# Patient Record
Sex: FEMALE | Race: BLACK OR AFRICAN AMERICAN | NOT HISPANIC OR LATINO | Employment: PART TIME | URBAN - METROPOLITAN AREA
[De-identification: names, ages, dates, MRNs, and addresses within clinical notes are randomized per-mention and may not be internally consistent; named-entity substitution may affect disease eponyms.]

---

## 2018-11-05 ENCOUNTER — TELEPHONE (OUTPATIENT)
Dept: OBSTETRICS AND GYNECOLOGY | Facility: CLINIC | Age: 33
End: 2018-11-05

## 2018-11-05 NOTE — TELEPHONE ENCOUNTER
----- Message from Maria Eugenia Badillo sent at 11/5/2018 10:23 AM CST -----  Contact: pt   Can the clinic reply in [facility]:     Who Called: MICHAEL CAMPBELL [[id number]]    Date of Positive Preg Test:  10-    1st day of Last Menstrual Cycle: 09-    List Any Difficulties: slight bleeding and discharge    What Number to Call Back: [phone]

## 2018-11-07 ENCOUNTER — OFFICE VISIT (OUTPATIENT)
Dept: OBSTETRICS AND GYNECOLOGY | Facility: CLINIC | Age: 33
End: 2018-11-07
Payer: MEDICAID

## 2018-11-07 VITALS — SYSTOLIC BLOOD PRESSURE: 130 MMHG | DIASTOLIC BLOOD PRESSURE: 84 MMHG | WEIGHT: 217.38 LBS

## 2018-11-07 DIAGNOSIS — Z3A.01 LESS THAN 8 WEEKS GESTATION OF PREGNANCY: Primary | ICD-10-CM

## 2018-11-07 DIAGNOSIS — N93.9 VAGINAL BLEEDING: ICD-10-CM

## 2018-11-07 DIAGNOSIS — O20.9 VAGINAL BLEEDING AFFECTING EARLY PREGNANCY: ICD-10-CM

## 2018-11-07 LAB
C TRACH DNA SPEC QL NAA+PROBE: NOT DETECTED
N GONORRHOEA DNA SPEC QL NAA+PROBE: NOT DETECTED

## 2018-11-07 PROCEDURE — 99999 PR PBB SHADOW E&M-EST. PATIENT-LVL III: CPT | Mod: PBBFAC,,, | Performed by: ADVANCED PRACTICE MIDWIFE

## 2018-11-07 PROCEDURE — 88175 CYTOPATH C/V AUTO FLUID REDO: CPT

## 2018-11-07 PROCEDURE — 99213 OFFICE O/P EST LOW 20 MIN: CPT | Mod: PBBFAC,PO | Performed by: ADVANCED PRACTICE MIDWIFE

## 2018-11-07 PROCEDURE — 99204 OFFICE O/P NEW MOD 45 MIN: CPT | Mod: S$PBB,TH,, | Performed by: ADVANCED PRACTICE MIDWIFE

## 2018-11-07 PROCEDURE — 87491 CHLMYD TRACH DNA AMP PROBE: CPT

## 2018-11-07 PROCEDURE — 87624 HPV HI-RISK TYP POOLED RSLT: CPT

## 2018-11-07 NOTE — PROGRESS NOTES
HISTORY OF PRESENT ILLNESS:    Melissa Carvalho is a 33 y.o. female, ,  Patient's last menstrual period was 2018.  for a routine exam complaining of amenorrhea.        Past Medical History:   Diagnosis Date    Heart murmur        History reviewed. No pertinent surgical history.    MEDICATIONS AND ALLERGIES:    No current outpatient medications on file.    Review of patient's allergies indicates:  No Known Allergies    Family History   Problem Relation Age of Onset    Breast cancer Neg Hx     Colon cancer Neg Hx     Ovarian cancer Neg Hx        Social History     Socioeconomic History    Marital status: Single     Spouse name: Not on file    Number of children: Not on file    Years of education: Not on file    Highest education level: Not on file   Social Needs    Financial resource strain: Not on file    Food insecurity - worry: Not on file    Food insecurity - inability: Not on file    Transportation needs - medical: Not on file    Transportation needs - non-medical: Not on file   Occupational History    Not on file   Tobacco Use    Smoking status: Never Smoker    Smokeless tobacco: Never Used   Substance and Sexual Activity    Alcohol use: No     Frequency: Never    Drug use: No    Sexual activity: Yes     Partners: Male     Birth control/protection: None   Other Topics Concern    Not on file   Social History Narrative    Not on file       COMPREHENSIVE GYN HISTORY:  PAP History: Denies abnormal Paps.  Infection History: Denies STDs. Denies PID.  Benign History: Denies uterine fibroids. Denies ovarian cysts. Denies endometriosis. Denies other conditions.  Cancer History: Denies cervical cancer. Denies uterine cancer or hyperplasia. Denies ovarian cancer. Denies vulvar cancer or pre-cancer. Denies vaginal cancer or pre-cancer. Denies breast cancer. Denies colon cancer.  Sexual Activity History: Reports currently being sexually active  Menstrual History: None.  Contraception:  None    ROS:  GENERAL: No weight changes. No swelling. No fatigue. No fever.  CARDIOVASCULAR: No chest pain. No shortness of breath. No leg cramps.   NEUROLOGICAL: No headaches. No vision changes.  BREASTS: No pain. No lumps. No discharge.  ABDOMEN: No pain. No nausea. No vomiting. No diarrhea. No constipation.  REPRODUCTIVE: No abnormal bleeding.   VULVA: No pain. No lesions. No itching.  VAGINA: No relaxation. No itching. No odor. No discharge. No lesions.  URINARY: No incontinence. No nocturia. No frequency. No dysuria.    /84   Wt 98.6 kg (217 lb 6 oz)   LMP 2018     PE:  AFFECT: Alert and oriented X 3. Interactive during exam  GENERAL: Appearance well-nourished, well-developed, in no acute distress.  HEENT: WNL  TEETH: Good dentition.  THYROID: No thyromegally   BREASTS: No masses, skin changes, nipple discharge or adenopathy bilaterally.  LUNGS: Easy and unlabored  HEART: Regular rate and rhythm   ABDOMEN: Soft and nontender without masses or organomegally.  EXTREMITIES: WNL  SKIN: No lesions or rashes.  VULVA: No lesions, masses or tenderness.  VAGINA: Moist and well rugated without lesions or discharge.Slight spotting on exam  CERVIX: Moist and pink without lesions, discharge or tenderness.      UTERUS SIZE: Limited exam sec to habitus  ADNEXA: No masses or tenderness.  RECTUM: Deferred.  ESTIMATE OF PELVIC CAPACITY: Adequate    PROCEDURES:  UPT Positive  Genprobe  Pap      DIAGNOSIS:  Gyn exam  IUP with stated LMP of 18, EGA 6wks, ROBBIN per LMP- 19    PLAN:Routine prenatal care    MEDICATIONS PRESCRIBED:  Discussed OTC PNV    LABS AND TESTS ORDERED:  New Ob Labs      1st TRIMESTER COUNSELING: Discussed all, booklet provided  Common complaints of pregnancy  HIV and other routine prenatal tests including  genetic screening  Risk factors identified by prenatal history  Anticipated course of prenatal care  Nutrition and weight gain counseling  Toxoplasmosis precautions (Cats/Raw  Meat)  Sexual activity and exercise  Environmental/Work hazards  Travel  Tobacco (Ask, Advise, Assess, Assist, and Arrange), as well as alcohol and drug use  Use of any medications (Including supplements, Vitamins, Herbs, or OTC Drugs)  Indications for Ultrasound  Domestic violence  Seat belt use  Childbirth classes/Hospital facilities     TERATOLOGY COUNSELING: Discussed options for sequential Screen                                                        Pt desires and orders for dating scan and NT                                                             FOLLOW-UP for a New Ob Visit in  4    weeks .

## 2018-11-08 ENCOUNTER — APPOINTMENT (OUTPATIENT)
Dept: LAB | Facility: HOSPITAL | Age: 33
End: 2018-11-08
Attending: OBSTETRICS & GYNECOLOGY
Payer: MEDICAID

## 2018-11-08 LAB
ABO + RH BLD: NORMAL
BASOPHILS # BLD AUTO: 0.02 K/UL
BASOPHILS NFR BLD: 0.4 %
BLD GP AB SCN CELLS X3 SERPL QL: NORMAL
DIFFERENTIAL METHOD: ABNORMAL
EOSINOPHIL # BLD AUTO: 0.1 K/UL
EOSINOPHIL NFR BLD: 1.1 %
ERYTHROCYTE [DISTWIDTH] IN BLOOD BY AUTOMATED COUNT: 16 %
HCT VFR BLD AUTO: 36.7 %
HGB BLD-MCNC: 11.8 G/DL
IMM GRANULOCYTES # BLD AUTO: 0.01 K/UL
IMM GRANULOCYTES NFR BLD AUTO: 0.2 %
LYMPHOCYTES # BLD AUTO: 1.8 K/UL
LYMPHOCYTES NFR BLD: 30.9 %
MCH RBC QN AUTO: 27.6 PG
MCHC RBC AUTO-ENTMCNC: 32.2 G/DL
MCV RBC AUTO: 86 FL
MONOCYTES # BLD AUTO: 0.5 K/UL
MONOCYTES NFR BLD: 9 %
NEUTROPHILS # BLD AUTO: 3.3 K/UL
NEUTROPHILS NFR BLD: 58.4 %
NRBC BLD-RTO: 0 /100 WBC
PLATELET # BLD AUTO: 256 K/UL
PMV BLD AUTO: 10.9 FL
RBC # BLD AUTO: 4.27 M/UL
WBC # BLD AUTO: 5.69 K/UL

## 2018-11-08 PROCEDURE — 85025 COMPLETE CBC W/AUTO DIFF WBC: CPT

## 2018-11-08 PROCEDURE — 87340 HEPATITIS B SURFACE AG IA: CPT

## 2018-11-08 PROCEDURE — 86592 SYPHILIS TEST NON-TREP QUAL: CPT

## 2018-11-08 PROCEDURE — 86850 RBC ANTIBODY SCREEN: CPT

## 2018-11-08 PROCEDURE — 86703 HIV-1/HIV-2 1 RESULT ANTBDY: CPT

## 2018-11-08 PROCEDURE — 86762 RUBELLA ANTIBODY: CPT

## 2018-11-09 ENCOUNTER — HOSPITAL ENCOUNTER (EMERGENCY)
Facility: OTHER | Age: 33
Discharge: HOME | End: 2018-11-09
Attending: EMERGENCY MEDICINE
Payer: MEDICAID

## 2018-11-09 VITALS
RESPIRATION RATE: 16 BRPM | DIASTOLIC BLOOD PRESSURE: 79 MMHG | HEIGHT: 61 IN | BODY MASS INDEX: 40.97 KG/M2 | OXYGEN SATURATION: 97 % | WEIGHT: 217 LBS | TEMPERATURE: 98 F | SYSTOLIC BLOOD PRESSURE: 168 MMHG | HEART RATE: 100 BPM

## 2018-11-09 DIAGNOSIS — O20.0 THREATENED MISCARRIAGE IN EARLY PREGNANCY: Primary | ICD-10-CM

## 2018-11-09 LAB
ALBUMIN SERPL BCP-MCNC: 3.9 G/DL
ALP SERPL-CCNC: 45 U/L
ALT SERPL W/O P-5'-P-CCNC: 14 U/L
ANION GAP SERPL CALC-SCNC: 10 MMOL/L
AST SERPL-CCNC: 18 U/L
BACTERIA #/AREA URNS HPF: ABNORMAL /HPF
BASOPHILS # BLD AUTO: 0.01 K/UL
BASOPHILS NFR BLD: 0.2 %
BILIRUB SERPL-MCNC: 0.2 MG/DL
BILIRUB UR QL STRIP: NEGATIVE
BUN SERPL-MCNC: 9 MG/DL
CALCIUM SERPL-MCNC: 10 MG/DL
CHLORIDE SERPL-SCNC: 105 MMOL/L
CLARITY UR: ABNORMAL
CO2 SERPL-SCNC: 23 MMOL/L
COLOR UR: ABNORMAL
CREAT SERPL-MCNC: 0.7 MG/DL
DIFFERENTIAL METHOD: ABNORMAL
EOSINOPHIL # BLD AUTO: 0 K/UL
EOSINOPHIL NFR BLD: 0.7 %
ERYTHROCYTE [DISTWIDTH] IN BLOOD BY AUTOMATED COUNT: 15.9 %
EST. GFR  (AFRICAN AMERICAN): >60 ML/MIN/1.73 M^2
EST. GFR  (NON AFRICAN AMERICAN): >60 ML/MIN/1.73 M^2
GLUCOSE SERPL-MCNC: 92 MG/DL
GLUCOSE UR QL STRIP: NEGATIVE
HBV SURFACE AG SERPL QL IA: NEGATIVE
HCG INTACT+B SERPL-ACNC: NORMAL MIU/ML
HCT VFR BLD AUTO: 36 %
HGB BLD-MCNC: 11.6 G/DL
HGB UR QL STRIP: ABNORMAL
HIV 1+2 AB+HIV1 P24 AG SERPL QL IA: NEGATIVE
KETONES UR QL STRIP: ABNORMAL
LEUKOCYTE ESTERASE UR QL STRIP: ABNORMAL
LYMPHOCYTES # BLD AUTO: 1.6 K/UL
LYMPHOCYTES NFR BLD: 29 %
MCH RBC QN AUTO: 27.6 PG
MCHC RBC AUTO-ENTMCNC: 32.2 G/DL
MCV RBC AUTO: 86 FL
MICROSCOPIC COMMENT: ABNORMAL
MONOCYTES # BLD AUTO: 0.4 K/UL
MONOCYTES NFR BLD: 7.3 %
NEUTROPHILS # BLD AUTO: 3.5 K/UL
NEUTROPHILS NFR BLD: 62.6 %
NITRITE UR QL STRIP: NEGATIVE
PH UR STRIP: 7 [PH] (ref 5–8)
PLATELET # BLD AUTO: 265 K/UL
PMV BLD AUTO: 10.6 FL
POTASSIUM SERPL-SCNC: 3.9 MMOL/L
PROT SERPL-MCNC: 7.7 G/DL
PROT UR QL STRIP: ABNORMAL
RBC # BLD AUTO: 4.21 M/UL
RBC #/AREA URNS HPF: >100 /HPF (ref 0–4)
RPR SER QL: NORMAL
RUBV IGG SER-ACNC: 90.3 IU/ML
RUBV IGG SER-IMP: REACTIVE
SODIUM SERPL-SCNC: 138 MMOL/L
SP GR UR STRIP: <=1.005 (ref 1–1.03)
SQUAMOUS #/AREA URNS HPF: 4 /HPF
URN SPEC COLLECT METH UR: ABNORMAL
UROBILINOGEN UR STRIP-ACNC: NEGATIVE EU/DL
WBC # BLD AUTO: 5.65 K/UL
WBC #/AREA URNS HPF: 4 /HPF (ref 0–5)

## 2018-11-09 PROCEDURE — 80053 COMPREHEN METABOLIC PANEL: CPT

## 2018-11-09 PROCEDURE — 96361 HYDRATE IV INFUSION ADD-ON: CPT

## 2018-11-09 PROCEDURE — 85025 COMPLETE CBC W/AUTO DIFF WBC: CPT

## 2018-11-09 PROCEDURE — 25000003 PHARM REV CODE 250: Performed by: PHYSICIAN ASSISTANT

## 2018-11-09 PROCEDURE — 96360 HYDRATION IV INFUSION INIT: CPT

## 2018-11-09 PROCEDURE — 81000 URINALYSIS NONAUTO W/SCOPE: CPT

## 2018-11-09 PROCEDURE — 99284 EMERGENCY DEPT VISIT MOD MDM: CPT | Mod: 25

## 2018-11-09 PROCEDURE — 84702 CHORIONIC GONADOTROPIN TEST: CPT

## 2018-11-09 RX ADMIN — SODIUM CHLORIDE 1000 ML: 0.9 INJECTION, SOLUTION INTRAVENOUS at 07:11

## 2018-11-10 NOTE — ED PROVIDER NOTES
"Encounter Date: 11/9/2018       History     Chief Complaint   Patient presents with    Vaginal Bleeding     6 weeks pregnant, moderate vaginal bleeding      Patient is 33 year old female who presents with complaints of vaginal bleeding in early pregnancy. She reports that she was at the grocery store when she felt a gush of blood as if her menstrual period was starting. She reports she went to the bathroom and noticed bright red blood saturating her underwear. She reports that there was no pain associated with this bleeding. She reports that this bleeding has now "slacked off a lot" but she reported to the ER to "get checked out just in case". She reports a speculum exam two days ago where she was told that she was about 6 weeks pregnant.  She has not yet had an ultrasound for this pregnancy.  She reports yesterday having intercourse but denies any bleeding following.  She has no associated nausea, vomiting, chest pain, shortness of breath, dizziness, altered mental status, dysuria hematuria.  Denies abdominal trauma or injury. She is currently accompanied by her male significant other who is at bedside.          Review of patient's allergies indicates:  No Known Allergies  Past Medical History:   Diagnosis Date    Heart murmur      No past surgical history on file.  Family History   Problem Relation Age of Onset    Breast cancer Neg Hx     Colon cancer Neg Hx     Ovarian cancer Neg Hx      Social History     Tobacco Use    Smoking status: Never Smoker    Smokeless tobacco: Never Used   Substance Use Topics    Alcohol use: No     Frequency: Never    Drug use: No     Review of Systems   Constitutional: Negative for fever.   HENT: Negative for sore throat.    Respiratory: Negative for shortness of breath.    Cardiovascular: Negative for chest pain.   Gastrointestinal: Negative for nausea.   Genitourinary: Positive for vaginal bleeding. Negative for dysuria.   Musculoskeletal: Negative for back pain.   Skin: " Negative for rash.   Neurological: Negative for weakness.   Hematological: Does not bruise/bleed easily.       Physical Exam     Initial Vitals [11/09/18 1904]   BP Pulse Resp Temp SpO2   (!) 198/102 104 16 99.2 °F (37.3 °C) 100 %      MAP       --         Physical Exam    Nursing note and vitals reviewed.  Constitutional: She appears well-developed and well-nourished. She is not diaphoretic. No distress.   Healthy-appearing female in no acute distress or apparent pain.  She makes good eye contact, speaks in clear full sentences and ambulates with ease.   HENT:   Head: Normocephalic and atraumatic.   Eyes: Conjunctivae and EOM are normal. Pupils are equal, round, and reactive to light. Right eye exhibits no discharge. Left eye exhibits no discharge. No scleral icterus.   Neck: Normal range of motion.   Cardiovascular: Normal rate, regular rhythm, normal heart sounds and intact distal pulses. Exam reveals no gallop and no friction rub.    No murmur heard.  Pulmonary/Chest: Breath sounds normal. She has no wheezes. She has no rhonchi. She has no rales.   Abdominal: Soft. Bowel sounds are normal. There is no tenderness. There is no rebound and no guarding.   Genitourinary:   Genitourinary Comments: Moderate amount of blood in vault with os open to 1 fingertip.  There is no CMT uterine or adnexal tender on bimanual exam.  No external genitalia lesions.   Musculoskeletal: Normal range of motion. She exhibits no edema or tenderness.   Lymphadenopathy:     She has no cervical adenopathy.   Neurological: She is alert and oriented to person, place, and time. She has normal strength.   Skin: Skin is warm. Capillary refill takes less than 2 seconds. No rash and no abscess noted. No erythema.   Psychiatric: She has a normal mood and affect. Her behavior is normal. Thought content normal.         ED Course   Procedures  Labs Reviewed   URINALYSIS, REFLEX TO URINE CULTURE   URINALYSIS MICROSCOPIC         Labs Reviewed    URINALYSIS, REFLEX TO URINE CULTURE - Abnormal; Notable for the following components:       Result Value    Color, UA Red (*)     Appearance, UA Hazy (*)     Specific Gravity, UA <=1.005 (*)     Protein, UA Trace (*)     Ketones, UA Trace (*)     Occult Blood UA 3+ (*)     Leukocytes, UA 1+ (*)     All other components within normal limits    Narrative:     Preferred Collection Type->Urine, Clean Catch   URINALYSIS MICROSCOPIC - Abnormal; Notable for the following components:    RBC, UA >100 (*)     All other components within normal limits    Narrative:     Preferred Collection Type->Urine, Clean Catch   CBC W/ AUTO DIFFERENTIAL - Abnormal; Notable for the following components:    Hemoglobin 11.6 (*)     Hematocrit 36.0 (*)     RDW 15.9 (*)     All other components within normal limits   COMPREHENSIVE METABOLIC PANEL - Abnormal; Notable for the following components:    Alkaline Phosphatase 45 (*)     All other components within normal limits   HCG, QUANTITATIVE, PREGNANCY          Imaging Results          US OB Less Than 14 Wks with Transvag(xpd (Final result)  Result time 11/09/18 21:29:02    Final result by Benny Kessler MD (11/09/18 21:29:02)                 Impression:      Pregnancy of unknown viability.  Intrauterine gestational sac is visualized which appears to contain possible septations or internal debris with no fetal pole identified.  Small amount of suspected subchorionic hemorrhage is seen.  Recommend serial beta HCGs.  Repeat pelvic ultrasound could be performed in 1-2 weeks.      Electronically signed by: Benny Kessler MD  Date:    11/09/2018  Time:    21:29             Narrative:    EXAMINATION:  US OB LESS THAN 14 WKS WITH TRANSVAGINAL (XPD)    CLINICAL HISTORY:  Vag Bleeding;    TECHNIQUE:  Transabdominal sonography of the pelvis was performed, followed by transvaginal sonography to better evaluate the uterus and ovaries.    COMPARISON:  None.    FINDINGS:  The uterus measures 12.5 x 5.6 x  6.5 cm. Uterine parenchyma is heterogenous in echotexture. In the uterine cavity there is a gestational sac with a mean diameter of 1.4 cm which would correspond with estimated gestational age of 6 weeks 1 day.  Possible septations or debris are seen within the gestational sac with no fetal pole or yolk sac visualized.  Small amount of suspected adjacent subchorionic hemorrhage is seen.    The right ovary measures 3.4 x 2.7 x 3.9 cm. The left ovary measures 1.4 x 2.8 x 2.2 cm. Arterial and venous flow are preserved bilaterally. A suspected corpus luteum cyst measuring 1.5 x 2.4 x 1.9 cm is seen in the right ovary.  No adnexal abnormalities are seen.  No significant free fluid is seen.                                     Medical Decision Making:   ED Management:  Urgent evaluation a 33-year-old female who presents with complaints of bleeding in early pregnancy.  She is afebrile, nontoxic appearing, hemodynamically stable. Physical exam outlined above and reveals benign abdomen, normal cardiopulmonary auscultation no focal neuro deficits.  Pelvic exam reveals slightly open os with moderate amount of bleeding.  No active hemorrhage..  Diagnostic labs are within normal range noting non acute anemia and no acute electrolyte abnormalities.  Urinalysis is has significant amount of red blood cells suspected to be vaginal blood contaminant.  Transvaginal ultrasound pending.     9:32 PM paged OB/GYN     Update:  Transvaginal ultrasound reveals evidence of pregnancy of unknown viability.  Patient is is candidate for beta block.  I discussed case with Dr. Rojas who will place order an outpatient lab for patient to have repeat beta HCG drawn on Monday morning.  Patient is encouraged to contact OBGYN office for follow-up appointment as early as Monday morning.  She is educated on bleeding precautions and ED return precautions.  She verbalized understanding is amenable to plan.  She is stable for discharge.     9:41 PM   Crystal calls back and says no need for patient to have repeat beta HCG on Monday.  Patient should wait for [facility] to call to make an appointment for her sometime this week.    Other:   I have discussed this case with another health care provider.       <> Summary of the Discussion: Billy                      Clinical Impression:   The encounter diagnosis was Threatened miscarriage in early pregnancy.                             Kiki Nuñez PA-C  11/09/18 [id number]

## 2018-11-10 NOTE — ED TRIAGE NOTES
"Pt presents to the ed with c/o vaginal bleeding that started today. Pt reports being 6 weeks pregnant. Pt states, " I was at the grocery store when I started to bleed, almost like my cycle was down." Pt denies abdominal pain or discomfort, n/v/d, dysuria, fever, or chills. Pt is aao, RR even and unlabored. NAD noted.  "

## 2018-11-10 NOTE — ED NOTES
Pt resting in bed comfortably with friend at bedside. Bed in the lowest locked position, side rails up x 2, call light within reach. NAD noted. Will continue to monitor

## 2018-11-12 ENCOUNTER — TELEPHONE (OUTPATIENT)
Dept: OBSTETRICS AND GYNECOLOGY | Facility: CLINIC | Age: 33
End: 2018-11-12

## 2018-11-12 LAB
HPV HR 12 DNA CVX QL NAA+PROBE: NEGATIVE
HPV16 AG SPEC QL: NEGATIVE
HPV18 DNA SPEC QL NAA+PROBE: NEGATIVE

## 2018-11-12 NOTE — TELEPHONE ENCOUNTER
Called and spoke to pt. Pt states that she is still having very light spotting since she went to ED on 11/9/18. Denies pelvic pain, NV, fever. Informed that I would ask Katharine for recommendation on lab work when she is in clinic tomorrow. Pt verbalized understanding and no further questions. ED precautions reviewed.

## 2018-11-12 NOTE — TELEPHONE ENCOUNTER
----- Message from Obinna Padilla sent at 11/12/2018 10:05 AM CST -----  Ob pt needs to talk to nurse about her visit at ER. Pt can be reached at [phone].

## 2018-11-13 ENCOUNTER — TELEPHONE (OUTPATIENT)
Dept: OBSTETRICS AND GYNECOLOGY | Facility: CLINIC | Age: 33
End: 2018-11-13

## 2018-11-13 LAB — HCG INTACT+B SERPL-ACNC: 2748 MIU/ML

## 2018-11-13 NOTE — TELEPHONE ENCOUNTER
----- Message from Janett Lao MA sent at 11/12/2018 10:34 AM CST -----  Pt went to ED 11/9, had labs and US. C/o light spotting since. Call back with recommendations.

## 2018-11-13 NOTE — TELEPHONE ENCOUNTER
Called and spoke with pt. Informed her that Katharine recommends to come in and have hcg level drawn today at [facility]. Pt states that she can be here around 2pm today. Pt c/o passing clots last night with continued vaginal bleeding this AM and lower back pain. Informed pt I would let Katharine know before she came in for bloodwork today. Pt verbalized understanding and no further questions.

## 2018-11-14 ENCOUNTER — TELEPHONE (OUTPATIENT)
Dept: OBSTETRICS AND GYNECOLOGY | Facility: CLINIC | Age: 33
End: 2018-11-14

## 2018-11-14 ENCOUNTER — HOSPITAL ENCOUNTER (EMERGENCY)
Facility: OTHER | Age: 33
Discharge: HOME | End: 2018-11-14
Attending: EMERGENCY MEDICINE
Payer: MEDICAID

## 2018-11-14 VITALS
RESPIRATION RATE: 16 BRPM | BODY MASS INDEX: 40.97 KG/M2 | HEIGHT: 61 IN | TEMPERATURE: 99 F | SYSTOLIC BLOOD PRESSURE: 175 MMHG | DIASTOLIC BLOOD PRESSURE: 112 MMHG | HEART RATE: 82 BPM | OXYGEN SATURATION: 96 % | WEIGHT: 217 LBS

## 2018-11-14 DIAGNOSIS — I10 HYPERTENSION, UNSPECIFIED TYPE: Primary | ICD-10-CM

## 2018-11-14 DIAGNOSIS — O03.9 SPONTANEOUS ABORTION: ICD-10-CM

## 2018-11-14 PROCEDURE — 99284 EMERGENCY DEPT VISIT MOD MDM: CPT

## 2018-11-14 RX ORDER — AMLODIPINE BESYLATE 5 MG/1
5 TABLET ORAL DAILY
Qty: 30 TABLET | Refills: 0 | Status: SHIPPED | OUTPATIENT
Start: 2018-11-14 | End: 2019-11-14

## 2018-11-14 NOTE — TELEPHONE ENCOUNTER
----- Message from Obinna Padilla sent at 11/14/2018 11:23 AM CST -----  Pt can be call at [phone]. Please call pt about her labs results.

## 2018-11-14 NOTE — ED PROVIDER NOTES
Encounter Date: 11/14/2018       History     Chief Complaint   Patient presents with    Hypertension     saw PCP today to establish care and was told to come here for elevated BP. currently 6 wks pregnant and spotting.      33-year-old female with history of heart murmur presents emergency department with complaints of elevated blood pressure reading.  She states that she was establishing care with primary care physician when it was noted that her blood pressure was elevated.  She states that it was 180s over 100.  She states that she was sent to the emergency department for further evaluation.  Patient was seen here in the emergency department on 11/09/2018.  She was seen for threatened miscarriage.  At the time she was having vaginal bleeding.  Ultrasound was concerning for pregnancy of unknown viability.  Patient had repeat beta HCG yesterday.  Patient unaware of results at this time.      The history is provided by the patient.     Review of patient's allergies indicates:  No Known Allergies  Past Medical History:   Diagnosis Date    Heart murmur      History reviewed. No pertinent surgical history.  Family History   Problem Relation Age of Onset    Breast cancer Neg Hx     Colon cancer Neg Hx     Ovarian cancer Neg Hx      Social History     Tobacco Use    Smoking status: Never Smoker    Smokeless tobacco: Never Used   Substance Use Topics    Alcohol use: No     Frequency: Never    Drug use: No     Review of Systems   Constitutional: Negative for chills and fever.   HENT: Negative for sore throat.    Respiratory: Negative for shortness of breath.    Cardiovascular: Negative for chest pain and leg swelling.   Gastrointestinal: Negative for abdominal pain, diarrhea, nausea and vomiting.   Genitourinary: Positive for vaginal bleeding. Negative for difficulty urinating, dysuria, flank pain, frequency, hematuria, pelvic pain and vaginal discharge.   Musculoskeletal: Negative for back pain.   Skin: Negative  for rash.   Neurological: Negative for weakness.   Hematological: Does not bruise/bleed easily.       Physical Exam     Initial Vitals [11/14/18 1145]   BP Pulse Resp Temp SpO2   (!) 186/116 93 18 98.6 °F (37 °C) 100 %      MAP       --         Physical Exam    Nursing note and vitals reviewed.  Constitutional: She appears well-developed and well-nourished. She is not diaphoretic. She is Obese .  Non-toxic appearance. No distress.   HENT:   Head: Normocephalic and atraumatic.   Right Ear: External ear normal.   Left Ear: External ear normal.   Nose: Nose normal.   Mouth/Throat: Oropharynx is clear and moist.   Eyes: Conjunctivae, EOM and lids are normal. Pupils are equal, round, and reactive to light. No scleral icterus.   Neck: Normal range of motion and phonation normal. Neck supple.   Cardiovascular: Normal rate, regular rhythm and normal heart sounds. Exam reveals no gallop and no friction rub.    No murmur heard.  No pitting edema to extremities   Pulmonary/Chest: Effort normal and breath sounds normal. No respiratory distress. She has no decreased breath sounds. She has no wheezes. She has no rhonchi. She has no rales.   Abdominal: Soft. Normal appearance and bowel sounds are normal. She exhibits no distension. There is no tenderness. There is no rigidity, no rebound, no guarding, no CVA tenderness, no tenderness at McBurney's point and negative Montanez's sign.   Musculoskeletal: Normal range of motion.   No obvious deformities, moving all extremities, normal gait   Neurological: She is alert and oriented to person, place, and time. She has normal strength. She displays no atrophy. No sensory deficit. She exhibits normal muscle tone. GCS eye subscore is 4. GCS verbal subscore is 5. GCS motor subscore is 6.   Skin: Skin is warm, dry and intact. No lesion and no rash noted. No erythema.   Psychiatric: She has a normal mood and affect. Her speech is normal and behavior is normal. Judgment normal. Cognition and  memory are normal.         ED Course   Procedures  Labs Reviewed - No data to display       Imaging Results    None          Medical Decision Making:   History:   Old Medical Records: I decided to obtain old medical records.  Initial Assessment:   33-year-old female with complaints consistent with hypertension.  Afebrile neurovascularly intact. Elevated blood pressure with no known history of hypertension.  Patient was seen here on the 9th with elevated blood pressure at that time as well.  She was seen in clinic today where her pressure was assessed 3 times and told that it was elevated and sent back to the emergency department for workup and evaluation.  She reports that she is approximately 6 weeks gestation.  She states that when she was seen here in the night she was told that it may be too early to see anything however that they were concerned for possible miscarriage.  She states that she had repeat blood work obtained yesterday in clinic and is unsure of results this time.  She denies spotting but denies cramping or pain.  She denies any urinary symptoms.  She denies chest pain or shortness of breath or swelling to the extremities. Exam is benign and documented above.  ED Management:  1:21 PM discussed with Dr Velez, gyn resident on-call.  She recommends repeat beta as once a week.  Will add to beta book and call patient with directions.  Labs were reviewed from previous ED visit on the 9th.  Patient did have trace protein in her urine.  BUN and creatinine were within normal limits.  H&H was stable with no elevation white blood cell count.  Will start patient on Norvasc 5 mg for her blood pressure.  She is urged to follow up with both OBGYN as well as her primary care physician in the next 48 hr or return to the emergency department for any worsening signs or symptoms. She states understanding and agrees this plan.  This is the extent of patient's complaints today.  This patient was discussed with the  attending physician who agrees with treatment plan.  Other:   I have discussed this case with another health care provider.       <> Summary of the Discussion: Keaton, attending ED physician and Rosie, GYN on call  This note was created using [employer] dictation.  There may be typographical errors secondary to dictation.                        Clinical Impression:     1. Hypertension, unspecified type    2. Spontaneous             Disposition:   Disposition: Discharged  Condition: Stable                        Sunitha Metcalf PA-C  18 [id number]       Sunitha Metcalf PA-C  18 [phone]

## 2018-11-14 NOTE — ED NOTES
Pt AAOx4 and appropriate at this time. Respirations even and unlabored. No acute distress noted.

## 2018-11-14 NOTE — ED TRIAGE NOTES
Pt was sent here for eval of elevated B/P after establishing care with her PCP today. Pt denies any s/s. She is 6 wks pregnant with her first pregnancy. Denies hx of HTN

## 2018-11-14 NOTE — TELEPHONE ENCOUNTER
Spoke with pt- discussed decrease in quant over last 5 days and SAB, pt reports bleeding like a light cycle with cramping- denies heavy bleeding. Has follow up quant scheduled in 1 week. Reviewed bleeding precautions.lab appt scheduled for repeat quant in 1 week.

## 2018-11-20 ENCOUNTER — LAB VISIT (OUTPATIENT)
Dept: LAB | Facility: HOSPITAL | Age: 33
End: 2018-11-20
Attending: OBSTETRICS & GYNECOLOGY
Payer: MEDICAID

## 2018-11-20 DIAGNOSIS — O03.9 SPONTANEOUS ABORTION: ICD-10-CM

## 2018-11-20 LAB — HCG INTACT+B SERPL-ACNC: 74 MIU/ML

## 2018-11-20 PROCEDURE — 84702 CHORIONIC GONADOTROPIN TEST: CPT

## 2018-11-27 ENCOUNTER — OFFICE VISIT (OUTPATIENT)
Dept: OBSTETRICS AND GYNECOLOGY | Facility: CLINIC | Age: 33
End: 2018-11-27
Payer: MEDICAID

## 2018-11-27 ENCOUNTER — APPOINTMENT (OUTPATIENT)
Dept: LAB | Facility: HOSPITAL | Age: 33
End: 2018-11-27
Attending: OBSTETRICS & GYNECOLOGY
Payer: MEDICAID

## 2018-11-27 VITALS
BODY MASS INDEX: 41.07 KG/M2 | DIASTOLIC BLOOD PRESSURE: 89 MMHG | WEIGHT: 217.38 LBS | SYSTOLIC BLOOD PRESSURE: 138 MMHG

## 2018-11-27 DIAGNOSIS — O03.9 SAB (SPONTANEOUS ABORTION): Primary | ICD-10-CM

## 2018-11-27 LAB — HCG INTACT+B SERPL-ACNC: 12 MIU/ML

## 2018-11-27 PROCEDURE — 99999 PR PBB SHADOW E&M-EST. PATIENT-LVL III: CPT | Mod: PBBFAC,,,

## 2018-11-27 PROCEDURE — 84702 CHORIONIC GONADOTROPIN TEST: CPT

## 2018-11-27 PROCEDURE — 99213 OFFICE O/P EST LOW 20 MIN: CPT | Mod: PBBFAC,TH,PO | Performed by: STUDENT IN AN ORGANIZED HEALTH CARE EDUCATION/TRAINING PROGRAM

## 2018-11-27 PROCEDURE — 99213 OFFICE O/P EST LOW 20 MIN: CPT | Mod: S$PBB,TH,, | Performed by: OBSTETRICS & GYNECOLOGY

## 2018-11-27 NOTE — PROGRESS NOTES
Chief Complaint   Patient presents with    Follow-up     D&C consult       HPI:  33 y.o. female  for follow-up after SAB. Patient presented to the ED on  with vaginal bleeding, nausea, and chills. Ultrasound at that time demonstrated intrauterine debris with no fetal pole. Patient states that she has not bled since 11/15 and denies nausea/vomiting, fever, chills, and abdominal pain. Beta-hCG was trended and was 11,797 () > 2,748 () > 74 (). Patient states that she has been doing well and would like to try to conceive again as soon as possible.       Past Medical History:   Diagnosis Date    Heart murmur      History reviewed. No pertinent surgical history.    Social History     Tobacco Use    Smoking status: Never Smoker    Smokeless tobacco: Never Used   Substance Use Topics    Alcohol use: No     Frequency: Never     Family History   Problem Relation Age of Onset    Breast cancer Neg Hx     Colon cancer Neg Hx     Ovarian cancer Neg Hx      OB History    Para Term  AB Living   1 1 1         SAB TAB Ectopic Multiple Live Births                  # Outcome Date GA Lbr Raul/2nd Weight Sex Delivery Anes PTL Lv   1 Term                   MEDICATIONS: Reviewed with patient.  ALLERGIES: Patient has no known allergies.     ROS:  Review of Systems   Constitutional: Negative for chills and fever.   Respiratory: Negative for shortness of breath.    Cardiovascular: Negative for chest pain.   Gastrointestinal: Negative for abdominal pain, diarrhea, nausea and vomiting.   Genitourinary: Negative for dysuria, menstrual problem, pelvic pain, urgency, vaginal bleeding, vaginal discharge and vaginal pain.   Musculoskeletal: Negative for back pain.   Neurological: Negative for headaches.   Psychiatric/Behavioral: Negative for depression.       PHYSICAL EXAM:    /89   Wt 98.6 kg (217 lb 6 oz)   LMP 2018 Comment: miscarriage  Breastfeeding? Unknown   BMI 41.07 kg/m²      Physical Exam:   Constitutional: She is oriented to person, place, and time. She appears well-developed and well-nourished. No distress.    HENT:   Head: Normocephalic and atraumatic.      Cardiovascular: Normal rate.     Pulmonary/Chest: Effort normal. No respiratory distress.        Abdominal: Soft. She exhibits no mass. There is no hepatosplenomegaly. There is no tenderness. There is no rebound and no guarding.     Genitourinary: Vagina normal and uterus normal. There is no rash, tenderness, lesion or injury on the right labia. There is no rash, tenderness, lesion or injury on the left labia. Uterus is not enlarged and not tender. Cervix is normal. Right adnexum displays no tenderness and no fullness. Left adnexum displays no tenderness and no fullness. No erythema, tenderness or bleeding in the vagina. No signs of injury around the vagina. No vaginal discharge found. Cervix exhibits no motion tenderness and no friability.   Genitourinary Comments: External genitalia: normal  Urethra: Non-tender, no masses  Urethral meatus: normal  Bladder: Non-tender, no masses           Musculoskeletal: She exhibits no edema or tenderness.       Neurological: She is alert and oriented to person, place, and time.    Skin: Skin is warm and dry. She is not diaphoretic.    Psychiatric: She has a normal mood and affect. Her behavior is normal. Judgment and thought content normal.         ASSESSMENT & PLAN:   SAB (spontaneous )  -     hCG, quantitative      - Pelvic exam normal  - Repeat beta-hCG today  - Counseled about trending hCG down to <5 before trying to conceive again  - Encouraged to continue taking PNVs  - Follow-up as needed    Zaynab Hopkins MD  OBGYN, PGY-1

## 2018-11-28 ENCOUNTER — TELEPHONE (OUTPATIENT)
Dept: OBSTETRICS AND GYNECOLOGY | Facility: HOSPITAL | Age: 33
End: 2018-11-28

## 2018-11-28 DIAGNOSIS — O03.9 SPONTANEOUS ABORTION: Primary | ICD-10-CM

## 2018-11-28 NOTE — TELEPHONE ENCOUNTER
Called patient and let her know that her beta-hCG yesterday was 12. Explained again that we would like to follow level down to <5 before trying to conceive again. She will get level redrawn at [facility] in 1 week. She voiced understanding and had no further questions.    Zaynab Hopkins MD  OBGYN, PGY-1

## 2018-12-06 ENCOUNTER — LAB VISIT (OUTPATIENT)
Dept: LAB | Facility: OTHER | Age: 33
End: 2018-12-06
Payer: MEDICAID

## 2018-12-06 DIAGNOSIS — O03.9 SPONTANEOUS ABORTION: ICD-10-CM

## 2018-12-06 LAB — HCG INTACT+B SERPL-ACNC: 3.5 MIU/ML

## 2018-12-06 PROCEDURE — 36415 COLL VENOUS BLD VENIPUNCTURE: CPT

## 2018-12-06 PROCEDURE — 84702 CHORIONIC GONADOTROPIN TEST: CPT

## 2018-12-07 ENCOUNTER — TELEPHONE (OUTPATIENT)
Dept: OBSTETRICS AND GYNECOLOGY | Facility: HOSPITAL | Age: 33
End: 2018-12-07

## 2018-12-07 NOTE — TELEPHONE ENCOUNTER
Spoke to patient about beta hCG results. Patient voiced understanding and had no further questions.    Zaynab Hopkins MD  OBGYN, PGY-1

## 2019-01-23 ENCOUNTER — OFFICE VISIT (OUTPATIENT)
Dept: OBSTETRICS AND GYNECOLOGY | Facility: CLINIC | Age: 34
End: 2019-01-23
Payer: MEDICAID

## 2019-01-23 VITALS
HEIGHT: 61 IN | WEIGHT: 218.25 LBS | BODY MASS INDEX: 41.21 KG/M2 | SYSTOLIC BLOOD PRESSURE: 130 MMHG | DIASTOLIC BLOOD PRESSURE: 92 MMHG

## 2019-01-23 DIAGNOSIS — Z71.9 ENCOUNTER FOR COUNSELING: Primary | ICD-10-CM

## 2019-01-23 PROCEDURE — 99999 PR PBB SHADOW E&M-EST. PATIENT-LVL III: CPT | Mod: PBBFAC,,, | Performed by: ADVANCED PRACTICE MIDWIFE

## 2019-01-23 PROCEDURE — 99213 OFFICE O/P EST LOW 20 MIN: CPT | Mod: PBBFAC,PO | Performed by: ADVANCED PRACTICE MIDWIFE

## 2019-01-23 PROCEDURE — 99212 OFFICE O/P EST SF 10 MIN: CPT | Mod: S$PBB,,, | Performed by: ADVANCED PRACTICE MIDWIFE

## 2019-01-23 PROCEDURE — 99212 PR OFFICE/OUTPT VISIT, EST, LEVL II, 10-19 MIN: ICD-10-PCS | Mod: S$PBB,,, | Performed by: ADVANCED PRACTICE MIDWIFE

## 2019-01-23 PROCEDURE — 99999 PR PBB SHADOW E&M-EST. PATIENT-LVL III: ICD-10-PCS | Mod: PBBFAC,,, | Performed by: ADVANCED PRACTICE MIDWIFE

## 2019-01-23 NOTE — PROGRESS NOTES
Pt in today as thought she was due for her Pap. Advised notmal Pap 1 year ago. Pt reports 1 normal cycle since SAB in Nov 2018. Pt is attempting pregnancy. Discussed menstrual calendar and ovulation.

## 2019-01-27 ENCOUNTER — HOSPITAL ENCOUNTER (EMERGENCY)
Facility: OTHER | Age: 34
Discharge: HOME | End: 2019-01-27
Attending: EMERGENCY MEDICINE
Payer: MEDICAID

## 2019-01-27 VITALS
DIASTOLIC BLOOD PRESSURE: 108 MMHG | RESPIRATION RATE: 18 BRPM | BODY MASS INDEX: 42.8 KG/M2 | HEIGHT: 60 IN | OXYGEN SATURATION: 100 % | HEART RATE: 110 BPM | SYSTOLIC BLOOD PRESSURE: 167 MMHG | TEMPERATURE: 101 F | WEIGHT: 218 LBS

## 2019-01-27 DIAGNOSIS — R05.9 COUGH: ICD-10-CM

## 2019-01-27 DIAGNOSIS — J20.8 ACUTE VIRAL BRONCHITIS: Primary | ICD-10-CM

## 2019-01-27 DIAGNOSIS — I10 UNCONTROLLED HYPERTENSION: ICD-10-CM

## 2019-01-27 LAB
B-HCG UR QL: NEGATIVE
CTP QC/QA: YES
INFLUENZA A, MOLECULAR: NEGATIVE
INFLUENZA B, MOLECULAR: NEGATIVE
SPECIMEN SOURCE: NORMAL

## 2019-01-27 PROCEDURE — 87502 INFLUENZA DNA AMP PROBE: CPT

## 2019-01-27 PROCEDURE — 99284 EMERGENCY DEPT VISIT MOD MDM: CPT | Mod: 25

## 2019-01-27 PROCEDURE — 25000003 PHARM REV CODE 250: Performed by: PHYSICIAN ASSISTANT

## 2019-01-27 PROCEDURE — 25000242 PHARM REV CODE 250 ALT 637 W/ HCPCS: Performed by: PHYSICIAN ASSISTANT

## 2019-01-27 PROCEDURE — 81025 URINE PREGNANCY TEST: CPT | Performed by: EMERGENCY MEDICINE

## 2019-01-27 PROCEDURE — 94640 AIRWAY INHALATION TREATMENT: CPT

## 2019-01-27 RX ORDER — ACETAMINOPHEN 500 MG
1000 TABLET ORAL
Status: COMPLETED | OUTPATIENT
Start: 2019-01-27 | End: 2019-01-27

## 2019-01-27 RX ORDER — ALBUTEROL SULFATE 0.83 MG/ML
2.5 SOLUTION RESPIRATORY (INHALATION)
Status: COMPLETED | OUTPATIENT
Start: 2019-01-27 | End: 2019-01-27

## 2019-01-27 RX ORDER — BENZONATATE 100 MG/1
100 CAPSULE ORAL 3 TIMES DAILY PRN
Qty: 20 CAPSULE | Refills: 0 | Status: SHIPPED | OUTPATIENT
Start: 2019-01-27 | End: 2019-02-06

## 2019-01-27 RX ORDER — ALBUTEROL SULFATE 90 UG/1
1-2 AEROSOL, METERED RESPIRATORY (INHALATION) EVERY 6 HOURS PRN
Qty: 1 INHALER | Refills: 0 | OUTPATIENT
Start: 2019-01-27 | End: 2021-08-27

## 2019-01-27 RX ADMIN — ALBUTEROL SULFATE 2.5 MG: 2.5 SOLUTION RESPIRATORY (INHALATION) at 03:01

## 2019-01-27 RX ADMIN — ACETAMINOPHEN 1000 MG: 500 TABLET ORAL at 04:01

## 2019-01-27 NOTE — ED NOTES
Two patient identifiers have been checked and are correct.      Appearance: Pt awake, alert & oriented to person, place & time. Pt in no acute distress at present time. Pt is clean and well groomed with clothes appropriately fastened.   Skin: Skin warm, dry & intact. Color consistent with ethnicity. Mucous membranes moist. No breakdown or brusing noted.   Musculoskeletal: Patient moving all extremities well, no obvious swelling or deformities noted.   Respiratory: Respirations spontaneous, even, and non-labored. Visible chest rise noted. Airway is open and patent. No accessory muscle use noted. Productive cough and chest congestion reported.   Neurologic: Sensation is intact. Speech is clear and appropriate. Eyes open spontaneously, behavior appropriate to situation, follows commands, facial expression symmetrical, bilateral hand grasp equal and even, purposeful motor response noted.  Cardiac: All peripheral pulses present. No Bilateral lower extremity edema. Cap refill is <3 seconds.  Abdomen: Abdomen soft, non-tender to palpation.   : Pt reports no dysuria or hematuria.

## 2019-01-27 NOTE — ED NOTES
"Pt presents to ED via self with complaints of an URI. Pt report productive cough and chest congestion which started yesterday, states "My boyfriend has got the flu". Lung sounds CTA bilaterally. Pt denies fever/chills, SOB, N/V/D. AAOx4, RR even and unlabored. Will continue to monitor.   "

## 2019-01-27 NOTE — ED PROVIDER NOTES
Encounter Date: 1/27/2019       History     Chief Complaint   Patient presents with    URI     Pt c/o subjective fever, chills, productive cough (yellow mucous) & chest congestion which started yesterday. Pt reports boyfriend who lives in the home was diagnosed with influenza A.     Patient is a 33-year-old female with history of hypertension who presents to the emergency department with cough and body aches.  Patient states over the last 2 days she has not been feeling well. She reports congestion and rhinorrhea. She reports cough with green sputum production. She states she is running fevers at home, but she has not checked with her thermometer.  She reports body aches and chills. She denies nausea or vomiting. She denies chest pain or shortness of breath. She states she does feel as if she is wheezing, but she has never had a history of asthma.  She states her  was recently diagnosed with flu.      The history is provided by the patient.     Review of patient's allergies indicates:  No Known Allergies  Past Medical History:   Diagnosis Date    Heart murmur      History reviewed. No pertinent surgical history.  Family History   Problem Relation Age of Onset    Breast cancer Neg Hx     Colon cancer Neg Hx     Ovarian cancer Neg Hx      Social History     Tobacco Use    Smoking status: Never Smoker    Smokeless tobacco: Never Used   Substance Use Topics    Alcohol use: Yes     Frequency: Never     Comment: Occasionally    Drug use: No     Review of Systems   Constitutional: Positive for activity change, appetite change, chills, fatigue and fever.   HENT: Positive for congestion and rhinorrhea. Negative for ear discharge, ear pain, postnasal drip, sore throat and trouble swallowing.    Respiratory: Positive for cough and wheezing. Negative for shortness of breath.    Cardiovascular: Negative for chest pain, palpitations and leg swelling.   Gastrointestinal: Negative for abdominal pain, blood in stool,  constipation, diarrhea, nausea and vomiting.   Genitourinary: Negative for dysuria, flank pain and hematuria.   Musculoskeletal: Negative for back pain, neck pain and neck stiffness.   Skin: Negative for rash and wound.   Neurological: Negative for dizziness, weakness, light-headedness and headaches.       Physical Exam     Initial Vitals [01/27/19 1407]   BP Pulse Resp Temp SpO2   (!) 174/130 104 18 99.4 °F (37.4 °C) 100 %      MAP       --         Physical Exam    Nursing note and vitals reviewed.  Constitutional: She appears well-developed and well-nourished. She is not diaphoretic.  Non-toxic appearance. No distress.   HENT:   Head: Normocephalic.   Right Ear: Hearing, tympanic membrane, external ear and ear canal normal.   Left Ear: Hearing, tympanic membrane, external ear and ear canal normal.   Nose: Mucosal edema present.   Mouth/Throat: Uvula is midline, oropharynx is clear and moist and mucous membranes are normal. No trismus in the jaw. No uvula swelling. No oropharyngeal exudate.   Posterior oropharyngeal cobblestoning    Eyes: Conjunctivae are normal. Pupils are equal, round, and reactive to light.   Neck: Normal range of motion.   Cardiovascular: Normal rate and regular rhythm.   No lower extremity edema   Pulmonary/Chest: No respiratory distress. She has wheezes (bilateral expiratory). She has no rhonchi. She has no rales. She exhibits no tenderness.   Abdominal: Soft. Bowel sounds are normal. There is no tenderness.   Lymphadenopathy:     She has no cervical adenopathy.   Neurological: She is alert and oriented to person, place, and time.   Skin: Skin is warm and dry. Capillary refill takes less than 2 seconds.   Psychiatric: She has a normal mood and affect.         ED Course   Procedures  Labs Reviewed   INFLUENZA A & B BY MOLECULAR   POCT URINE PREGNANCY          Imaging Results          X-Ray Chest PA And Lateral (Final result)  Result time 01/27/19 15:46:05    Final result by Sukhdeep Hung  MD (01/27/19 15:46:05)                 Impression:      1. No acute cardiopulmonary process.      Electronically signed by: Sukhdeep Hung MD  Date:    01/27/2019  Time:    15:46             Narrative:    EXAMINATION:  XR CHEST PA AND LATERAL    CLINICAL HISTORY:  Cough    TECHNIQUE:  PA and lateral views of the chest were performed.    COMPARISON:  None    FINDINGS:  The cardiomediastinal silhouette is not enlarged..  There is no pleural effusion.  The trachea is midline.  The lungs are symmetrically expanded bilaterally without evidence of acute parenchymal process. No large focal consolidation seen.  There is no pneumothorax.  The osseous structures are unremarkable.                              X-Rays:   Independently Interpreted Readings:   Other Readings:  No acute cardiopulmonary process    Medical Decision Making:   Initial Assessment:   Urgent evaluation of a 33-year-old female with history of hypertension who presents to the emergency department with cough and body aches.  Patient is afebrile, nontoxic appearing, and hemodynamically stable.  Nasal mucosal edema on exam.  Posterior oropharyngeal cobblestoning.  No nuchal rigidity.  On lung auscultation, there are diffuse expiratory wheezing bilaterally.  No lower extremity edema. Benign abdominal exam.  With patient's history and presentation, I suspect viral type syndrome.  Will check for flu.  Chest x-ray will be obtained.  Patient will be given albuterol.  ED Management:  4:24 PM  Negative flu.  Chest x-ray reveals no acute cardiopulmonary process.  Patient will be given albuterol for wheezing at home.  I suspect this is viral etiology.  She is advised to follow up with PCP sometime this week for re-evaluation.  She is advised to return to the emergency department with any worsening symptoms or concerns.    4:30 PM  Pt's blood pressure is elevated.  She states she hasn't taken her medicine in many months because she doesn't have PCP.  She is advised to  keep journal to present to PCP.    4:43 PM  Upon discharge, pt's temperature is creeping up.  Will give tylenol at this time.                      Clinical Impression:   The primary encounter diagnosis was Acute viral bronchitis. A diagnosis of Cough was also pertinent to this visit.                             Jaymie Kam PA-C  01/27/19 1625       Jaymie Kam PA-C  01/27/19 1625       Jaymie Kam PA-C  01/27/19 1631       Jaymie Kam PA-C  01/27/19 1644

## 2019-09-19 ENCOUNTER — HOSPITAL ENCOUNTER (EMERGENCY)
Facility: OTHER | Age: 34
Discharge: HOME | End: 2019-09-19
Attending: EMERGENCY MEDICINE
Payer: MEDICAID

## 2019-09-19 VITALS
RESPIRATION RATE: 18 BRPM | OXYGEN SATURATION: 98 % | SYSTOLIC BLOOD PRESSURE: 166 MMHG | HEIGHT: 61 IN | WEIGHT: 216 LBS | DIASTOLIC BLOOD PRESSURE: 109 MMHG | HEART RATE: 79 BPM | BODY MASS INDEX: 40.78 KG/M2 | TEMPERATURE: 99 F

## 2019-09-19 DIAGNOSIS — R19.7 DIARRHEA, UNSPECIFIED TYPE: Primary | ICD-10-CM

## 2019-09-19 LAB
ANION GAP SERPL CALC-SCNC: 7 MMOL/L (ref 8–16)
B-HCG UR QL: NEGATIVE
BUN SERPL-MCNC: 11 MG/DL (ref 6–20)
CALCIUM SERPL-MCNC: 9.7 MG/DL (ref 8.7–10.5)
CHLORIDE SERPL-SCNC: 106 MMOL/L (ref 95–110)
CO2 SERPL-SCNC: 28 MMOL/L (ref 23–29)
CREAT SERPL-MCNC: 0.8 MG/DL (ref 0.5–1.4)
CTP QC/QA: YES
EST. GFR  (AFRICAN AMERICAN): >60 ML/MIN/1.73 M^2
EST. GFR  (NON AFRICAN AMERICAN): >60 ML/MIN/1.73 M^2
GLUCOSE SERPL-MCNC: 103 MG/DL (ref 70–110)
POTASSIUM SERPL-SCNC: 4.1 MMOL/L (ref 3.5–5.1)
SODIUM SERPL-SCNC: 141 MMOL/L (ref 136–145)

## 2019-09-19 PROCEDURE — 80048 BASIC METABOLIC PNL TOTAL CA: CPT

## 2019-09-19 PROCEDURE — 99284 EMERGENCY DEPT VISIT MOD MDM: CPT

## 2019-09-19 PROCEDURE — 36415 COLL VENOUS BLD VENIPUNCTURE: CPT

## 2019-09-19 PROCEDURE — 81025 URINE PREGNANCY TEST: CPT | Performed by: EMERGENCY MEDICINE

## 2019-09-19 RX ORDER — CIPROFLOXACIN 500 MG/1
500 TABLET ORAL 2 TIMES DAILY
Qty: 14 TABLET | Refills: 0 | Status: SHIPPED | OUTPATIENT
Start: 2019-09-19 | End: 2019-09-26

## 2019-09-19 RX ORDER — METRONIDAZOLE 500 MG/1
500 TABLET ORAL EVERY 12 HOURS
Qty: 14 TABLET | Refills: 0 | Status: SHIPPED | OUTPATIENT
Start: 2019-09-19 | End: 2019-09-26

## 2019-09-19 NOTE — ED PROVIDER NOTES
Encounter Date: 9/19/2019    SCRIBE #1 NOTE: I, Jono Hall, am scribing for, and in the presence of, Dr. Spears.       History     Chief Complaint   Patient presents with    Diarrhea     Diarrhea since saturday. Denies vomiting     Time seen by provider: 11:07 AM    This is a 33 y.o. female with a history of a heart murmur who presents with complaint of diarrhea that began approximately five days ago. On 9/13/19 she had some seafood for dinner. The next day she started to experience abdominal pain, subjective fever, and chills. She reports that her fever and chills have since resolved. Her diarrhea has increased in frequency since onset five days ago. She denies sore throat, chest pain, shortness of breath, nausea, vomiting, blood in the stool, and dysuria.     The history is provided by the patient.     Review of patient's allergies indicates:  No Known Allergies  Past Medical History:   Diagnosis Date    Heart murmur      No past surgical history on file.  Family History   Problem Relation Age of Onset    Breast cancer Neg Hx     Colon cancer Neg Hx     Ovarian cancer Neg Hx      Social History     Tobacco Use    Smoking status: Never Smoker    Smokeless tobacco: Never Used   Substance Use Topics    Alcohol use: Yes     Frequency: Never     Comment: Occasionally    Drug use: No     Review of Systems   Constitutional: Positive for chills (that has resolved) and fever (that has resolved). Negative for diaphoresis.   HENT: Negative for congestion.    Eyes: Negative for discharge.   Respiratory: Negative for shortness of breath.    Cardiovascular: Negative for chest pain.   Gastrointestinal: Positive for abdominal pain and diarrhea. Negative for blood in stool, nausea and vomiting.   Genitourinary: Negative for dysuria.   Musculoskeletal: Negative for back pain.   Neurological: Negative for dizziness.   Hematological: Does not bruise/bleed easily.   All other systems reviewed and are  negative.      Physical Exam     Initial Vitals [09/19/19 1006]   BP Pulse Resp Temp SpO2   (!) 189/112 79 18 98.3 °F (36.8 °C) 100 %      MAP       --         Physical Exam    Nursing note and vitals reviewed.  Constitutional: She appears well-developed and well-nourished.  Non-toxic appearance. She does not have a sickly appearance. No distress.   HENT:   Head: Normocephalic and atraumatic.   Nose: Nose normal.   Mouth/Throat: Oropharynx is clear and moist.   Eyes: Conjunctivae, EOM and lids are normal. Pupils are equal, round, and reactive to light. Right eye exhibits no nystagmus. Left eye exhibits no nystagmus.   Neck: Trachea normal, normal range of motion and phonation normal. Neck supple. No stridor present.   Cardiovascular: Normal rate, regular rhythm, normal heart sounds and normal pulses. Exam reveals no gallop and no friction rub.    No murmur heard.  Pulmonary/Chest: Breath sounds normal. No respiratory distress. She has no wheezes. She has no rhonchi. She has no rales.   Abdominal: Soft. Normal appearance and bowel sounds are normal. She exhibits no distension. There is no tenderness. There is no rigidity, no rebound, no guarding, no tenderness at McBurney's point and negative Montanez's sign.   Musculoskeletal: She exhibits no edema or tenderness.   Neurological: She is alert and oriented to person, place, and time. She has normal strength and normal reflexes. She displays normal reflexes. No cranial nerve deficit or sensory deficit. She displays a negative Romberg sign. GCS eye subscore is 4. GCS verbal subscore is 5. GCS motor subscore is 6.   Skin: Skin is warm, dry and intact. Capillary refill takes less than 2 seconds. No rash noted. No pallor.   Psychiatric: Her speech is normal and behavior is normal.         ED Course   Procedures  Labs Reviewed   BASIC METABOLIC PANEL - Abnormal; Notable for the following components:       Result Value    Anion Gap 7 (*)     All other components within normal  limits   POCT URINE PREGNANCY          Imaging Results    None          Medical Decision Making:   Initial Assessment:   33-year-old female presents with diarrhea for 3-4 days.  Some nausea but no vomiting.  Has been able to maintain fluids.  The afebrile.  Will get a chemistry to evaluate for electrolyte abnormalities.  She otherwise appears well. I did note that her blood pressure has been elevated.  She stated she was diagnosed with hypertension during pregnancy but has not followed up with anybody since delivering more and has not taken indication I see no signs of end-organ damage do not feel this needs further workup at this time.  I do discuss the issue with her and recommend she follow up with primary care for further evaluation management.    Differential could include enteritis although I have a low suspicion for colitis.  She has no findings to suggest acute appendicitis or cholecystitis based on my physical exam.  Based on her age and lack of risk factors I do not suspect mesenteric ischemia.  Clinical Tests:   Lab Tests: Ordered and Reviewed  ED Management:  Blood work reviewed showing no acute abnormality.  Reassured the patient will discharge home to follow up with primary care as an outpatient.  Discharged home with Cipro and Flagyl for improvement of diarrhea.    Patient discharged home in stable condition. Diagnosis and treatment plan explained to patient and/or family member who is at bedside. I have answered all questions and the patient is satisfied with the plan of care. The patient demonstrates understanding of the care plan. This is the extent to the patients complaints today here in the emergency department.            Scribe Attestation:   Scribe #1: I performed the above scribed service and the documentation accurately describes the services I performed. I attest to the accuracy of the note.    Attending Attestation:           Physician Attestation for Scribe:  Physician Attestation  Statement for Scribe #1: I, Dr. Spears, reviewed documentation, as scribed by Jono Hall  in my presence, and it is both accurate and complete.                    Clinical Impression:     1. Diarrhea, unspecified type                                   Konstantin Spears, DO  09/19/19 [id number]

## 2019-10-22 ENCOUNTER — HOSPITAL ENCOUNTER (EMERGENCY)
Facility: OTHER | Age: 34
Discharge: HOME | End: 2019-10-22
Attending: EMERGENCY MEDICINE
Payer: COMMERCIAL

## 2019-10-22 VITALS
DIASTOLIC BLOOD PRESSURE: 83 MMHG | WEIGHT: 219 LBS | HEART RATE: 87 BPM | HEIGHT: 61 IN | RESPIRATION RATE: 18 BRPM | BODY MASS INDEX: 41.35 KG/M2 | SYSTOLIC BLOOD PRESSURE: 135 MMHG | OXYGEN SATURATION: 99 % | TEMPERATURE: 99 F

## 2019-10-22 DIAGNOSIS — S60.032A CONTUSION OF LEFT MIDDLE FINGER WITHOUT DAMAGE TO NAIL, INITIAL ENCOUNTER: Primary | ICD-10-CM

## 2019-10-22 LAB
B-HCG UR QL: NEGATIVE
CTP QC/QA: YES

## 2019-10-22 PROCEDURE — 99284 EMERGENCY DEPT VISIT MOD MDM: CPT | Mod: 25

## 2019-10-22 PROCEDURE — 81025 URINE PREGNANCY TEST: CPT | Performed by: EMERGENCY MEDICINE

## 2019-10-22 PROCEDURE — 25000003 PHARM REV CODE 250: Performed by: PHYSICIAN ASSISTANT

## 2019-10-22 RX ORDER — HYDROCODONE BITARTRATE AND ACETAMINOPHEN 5; 325 MG/1; MG/1
1 TABLET ORAL
Status: COMPLETED | OUTPATIENT
Start: 2019-10-22 | End: 2019-10-22

## 2019-10-22 RX ORDER — IBUPROFEN 600 MG/1
600 TABLET ORAL EVERY 6 HOURS PRN
Qty: 20 TABLET | Refills: 0 | Status: SHIPPED | OUTPATIENT
Start: 2019-10-22

## 2019-10-22 RX ADMIN — HYDROCODONE BITARTRATE AND ACETAMINOPHEN 1 TABLET: 5; 325 TABLET ORAL at 02:10

## 2019-10-22 NOTE — ED NOTES
Pain and swelling to left hand and first 3 digits after slamming her hand in a register drawer at work. She reports 8 out of 10 pain to left hand. Pt is AAOx  3, answers questions appropriately

## 2019-10-23 NOTE — ED PROVIDER NOTES
Encounter Date: 10/22/2019       History     Chief Complaint   Patient presents with    Hand Pain     Pt c/o left left middle & 4th finger pain radiating to dorsum of hand with decreased ROM after slamming her hand in the cash register drawer 30 mins PTA.     Patient is a 33-year-old female who has history of hypertension who presents to the emergency department with hand injury. Patient states she was at work, when the cash register slammed on her left 3rd digit.  She reports swelling and bruising to the finger.  She denies any open wounds.  She reports pain with range of motion.    The history is provided by the patient.     Review of patient's allergies indicates:  No Known Allergies  Past Medical History:   Diagnosis Date    Heart murmur      No past surgical history on file.  Family History   Problem Relation Age of Onset    Breast cancer Neg Hx     Colon cancer Neg Hx     Ovarian cancer Neg Hx      Social History     Tobacco Use    Smoking status: Never Smoker    Smokeless tobacco: Never Used   Substance Use Topics    Alcohol use: Yes     Frequency: Never     Comment: Occasionally    Drug use: No     Review of Systems   Constitutional: Negative for activity change, appetite change, chills, fatigue and fever.   HENT: Negative for congestion, ear discharge, ear pain, postnasal drip, rhinorrhea, sore throat and trouble swallowing.    Respiratory: Negative for cough.    Cardiovascular: Negative for chest pain.   Gastrointestinal: Negative for abdominal pain, blood in stool, constipation, diarrhea, nausea and vomiting.   Genitourinary: Negative for dysuria, flank pain and hematuria.   Musculoskeletal: Negative for back pain, neck pain and neck stiffness.        Finger pain   Skin: Negative for rash and wound.   Neurological: Negative for dizziness, weakness, light-headedness and headaches.       Physical Exam     Initial Vitals [10/22/19 1309]   BP Pulse Resp Temp SpO2   135/83 87 18 98.7 °F (37.1 °C) 99 %       MAP       --         Physical Exam    Nursing note and vitals reviewed.  Constitutional: She appears well-developed and well-nourished. She is not diaphoretic. No distress.   HENT:   Head: Normocephalic.   Right Ear: External ear normal.   Left Ear: External ear normal.   Nose: Nose normal.   Mouth/Throat: Oropharynx is clear and moist. No oropharyngeal exudate.   Eyes: Conjunctivae are normal. Pupils are equal, round, and reactive to light.   Neck: Normal range of motion.   Cardiovascular: Normal rate and regular rhythm.   Pulmonary/Chest: Breath sounds normal.   Abdominal: Soft. Bowel sounds are normal. There is no tenderness.   Musculoskeletal:   Left third digit: bony tenderness at the distal phalanx with ecchymosis.  NV intact.     Neurological: She is alert and oriented to person, place, and time.   Skin: Skin is warm and dry. Capillary refill takes less than 2 seconds.   Psychiatric: She has a normal mood and affect.         ED Course   Procedures  Labs Reviewed   POCT URINE PREGNANCY          Imaging Results          X-Ray Hand 3 view Left (Final result)  Result time 10/22/19 14:57:37    Final result by Genia Johnson MD (10/22/19 14:57:37)                 Impression:      No acute osseous abnormality.      Electronically signed by: Genia Johnson  Date:    10/22/2019  Time:    14:57             Narrative:    EXAMINATION:  XR HAND COMPLETE 3 VIEW LEFT    CLINICAL HISTORY:  trama;.    TECHNIQUE:  PA, lateral, and oblique views of the left hand were performed.    COMPARISON:  None    FINDINGS:  No acute, displaced fracture.  No aggressive osseous abnormality.  Joint spaces are maintained.  No focal soft tissue abnormality.                                 Medical Decision Making:   Initial Assessment:   Urgent evaluation of a 33-year-old female with history of hypertension who presents to the emergency department with a finger injury. Patient is afebrile, nontoxic appearing, and hemodynamically stable. On  exam, patient has bony tenderness to the left 3rd distal digit.  Finger is neurovascularly intact.  X-ray reveals no acute osseous injury. Patient is advised to follow up with PCP or return to the emergency department with any worsening symptoms or concerns.                      Clinical Impression:       ICD-10-CM ICD-9-CM   1. Contusion of left middle finger without damage to nail, initial encounter S60.032A 923.3                                Jaymie Kam PA-C  10/22/19 2027

## 2021-04-14 ENCOUNTER — OFFICE VISIT (OUTPATIENT)
Dept: OBSTETRICS AND GYNECOLOGY | Facility: CLINIC | Age: 36
End: 2021-04-14
Payer: MEDICAID

## 2021-04-14 VITALS
HEIGHT: 61 IN | BODY MASS INDEX: 42.95 KG/M2 | DIASTOLIC BLOOD PRESSURE: 60 MMHG | SYSTOLIC BLOOD PRESSURE: 130 MMHG | WEIGHT: 227.5 LBS

## 2021-04-14 DIAGNOSIS — Z32.02 NEGATIVE PREGNANCY TEST: Primary | ICD-10-CM

## 2021-04-14 DIAGNOSIS — N92.1 METRORRHAGIA: ICD-10-CM

## 2021-04-14 DIAGNOSIS — Z91.89 AT RISK FOR FERTILITY PROBLEMS: ICD-10-CM

## 2021-04-14 DIAGNOSIS — N94.6 DYSMENORRHEA: ICD-10-CM

## 2021-04-14 PROCEDURE — 99999 PR PBB SHADOW E&M-EST. PATIENT-LVL III: CPT | Mod: PBBFAC,,, | Performed by: NURSE PRACTITIONER

## 2021-04-14 PROCEDURE — 99999 PR PBB SHADOW E&M-EST. PATIENT-LVL III: ICD-10-PCS | Mod: PBBFAC,,, | Performed by: NURSE PRACTITIONER

## 2021-04-14 PROCEDURE — 99213 OFFICE O/P EST LOW 20 MIN: CPT | Mod: S$PBB,,, | Performed by: NURSE PRACTITIONER

## 2021-04-14 PROCEDURE — 99213 OFFICE O/P EST LOW 20 MIN: CPT | Mod: PBBFAC | Performed by: NURSE PRACTITIONER

## 2021-04-14 PROCEDURE — 99213 PR OFFICE/OUTPT VISIT, EST, LEVL III, 20-29 MIN: ICD-10-PCS | Mod: S$PBB,,, | Performed by: NURSE PRACTITIONER

## 2021-04-14 RX ORDER — CARVEDILOL 25 MG/1
25 TABLET ORAL 2 TIMES DAILY WITH MEALS
COMMUNITY

## 2021-04-14 RX ORDER — FERROUS SULFATE 324(65)MG
324 TABLET, DELAYED RELEASE (ENTERIC COATED) ORAL DAILY
COMMUNITY

## 2021-04-14 RX ORDER — LOSARTAN POTASSIUM 100 MG/1
100 TABLET ORAL DAILY
COMMUNITY

## 2021-05-23 ENCOUNTER — HOSPITAL ENCOUNTER (EMERGENCY)
Facility: OTHER | Age: 36
Discharge: HOME | End: 2021-05-23
Attending: EMERGENCY MEDICINE
Payer: MEDICAID

## 2021-05-23 VITALS
DIASTOLIC BLOOD PRESSURE: 98 MMHG | TEMPERATURE: 99 F | WEIGHT: 236 LBS | HEART RATE: 79 BPM | RESPIRATION RATE: 14 BRPM | OXYGEN SATURATION: 97 % | SYSTOLIC BLOOD PRESSURE: 170 MMHG | BODY MASS INDEX: 44.56 KG/M2 | HEIGHT: 61 IN

## 2021-05-23 DIAGNOSIS — I10 HYPERTENSION, UNSPECIFIED TYPE: Primary | ICD-10-CM

## 2021-05-23 DIAGNOSIS — I10 HYPERTENSION: ICD-10-CM

## 2021-05-23 LAB
ANION GAP SERPL CALC-SCNC: 9 MMOL/L (ref 8–16)
B-HCG UR QL: NEGATIVE
BASOPHILS # BLD AUTO: 0.03 K/UL (ref 0–0.2)
BASOPHILS NFR BLD: 0.6 % (ref 0–1.9)
BUN SERPL-MCNC: 10 MG/DL (ref 6–20)
CALCIUM SERPL-MCNC: 9.1 MG/DL (ref 8.7–10.5)
CHLORIDE SERPL-SCNC: 107 MMOL/L (ref 95–110)
CO2 SERPL-SCNC: 24 MMOL/L (ref 23–29)
CREAT SERPL-MCNC: 0.9 MG/DL (ref 0.5–1.4)
CTP QC/QA: YES
DIFFERENTIAL METHOD: ABNORMAL
EOSINOPHIL # BLD AUTO: 0.1 K/UL (ref 0–0.5)
EOSINOPHIL NFR BLD: 1.3 % (ref 0–8)
ERYTHROCYTE [DISTWIDTH] IN BLOOD BY AUTOMATED COUNT: 14.6 % (ref 11.5–14.5)
EST. GFR  (AFRICAN AMERICAN): >60 ML/MIN/1.73 M^2
EST. GFR  (NON AFRICAN AMERICAN): >60 ML/MIN/1.73 M^2
GLUCOSE SERPL-MCNC: 113 MG/DL (ref 70–110)
HCT VFR BLD AUTO: 41.9 % (ref 37–48.5)
HCV AB SERPL QL IA: NEGATIVE
HGB BLD-MCNC: 13 G/DL (ref 12–16)
HIV 1+2 AB+HIV1 P24 AG SERPL QL IA: NEGATIVE
IMM GRANULOCYTES # BLD AUTO: 0.01 K/UL (ref 0–0.04)
IMM GRANULOCYTES NFR BLD AUTO: 0.2 % (ref 0–0.5)
LYMPHOCYTES # BLD AUTO: 1.8 K/UL (ref 1–4.8)
LYMPHOCYTES NFR BLD: 33.7 % (ref 18–48)
MCH RBC QN AUTO: 27.1 PG (ref 27–31)
MCHC RBC AUTO-ENTMCNC: 31 G/DL (ref 32–36)
MCV RBC AUTO: 88 FL (ref 82–98)
MONOCYTES # BLD AUTO: 0.5 K/UL (ref 0.3–1)
MONOCYTES NFR BLD: 9.6 % (ref 4–15)
NEUTROPHILS # BLD AUTO: 2.9 K/UL (ref 1.8–7.7)
NEUTROPHILS NFR BLD: 54.6 % (ref 38–73)
NRBC BLD-RTO: 0 /100 WBC
PLATELET # BLD AUTO: 226 K/UL (ref 150–450)
PMV BLD AUTO: 10.8 FL (ref 9.2–12.9)
POTASSIUM SERPL-SCNC: 4.1 MMOL/L (ref 3.5–5.1)
RBC # BLD AUTO: 4.79 M/UL (ref 4–5.4)
SODIUM SERPL-SCNC: 140 MMOL/L (ref 136–145)
TROPONIN I SERPL DL<=0.01 NG/ML-MCNC: 0.01 NG/ML (ref 0–0.03)
WBC # BLD AUTO: 5.22 K/UL (ref 3.9–12.7)

## 2021-05-23 PROCEDURE — 81025 URINE PREGNANCY TEST: CPT | Performed by: EMERGENCY MEDICINE

## 2021-05-23 PROCEDURE — 86803 HEPATITIS C AB TEST: CPT | Performed by: EMERGENCY MEDICINE

## 2021-05-23 PROCEDURE — 99284 EMERGENCY DEPT VISIT MOD MDM: CPT | Mod: 25

## 2021-05-23 PROCEDURE — 84484 ASSAY OF TROPONIN QUANT: CPT | Performed by: EMERGENCY MEDICINE

## 2021-05-23 PROCEDURE — 93010 EKG 12-LEAD: ICD-10-PCS | Mod: ,,, | Performed by: INTERNAL MEDICINE

## 2021-05-23 PROCEDURE — 86703 HIV-1/HIV-2 1 RESULT ANTBDY: CPT | Performed by: EMERGENCY MEDICINE

## 2021-05-23 PROCEDURE — 85025 COMPLETE CBC W/AUTO DIFF WBC: CPT | Performed by: EMERGENCY MEDICINE

## 2021-05-23 PROCEDURE — 93010 ELECTROCARDIOGRAM REPORT: CPT | Mod: ,,, | Performed by: INTERNAL MEDICINE

## 2021-05-23 PROCEDURE — 80048 BASIC METABOLIC PNL TOTAL CA: CPT | Performed by: EMERGENCY MEDICINE

## 2021-05-23 PROCEDURE — 93005 ELECTROCARDIOGRAM TRACING: CPT

## 2021-11-05 ENCOUNTER — HOSPITAL ENCOUNTER (EMERGENCY)
Facility: OTHER | Age: 36
Discharge: HOME | End: 2021-11-05
Attending: EMERGENCY MEDICINE
Payer: MEDICAID

## 2021-11-05 VITALS
HEART RATE: 93 BPM | TEMPERATURE: 99 F | SYSTOLIC BLOOD PRESSURE: 169 MMHG | DIASTOLIC BLOOD PRESSURE: 104 MMHG | BODY MASS INDEX: 42.29 KG/M2 | HEIGHT: 61 IN | WEIGHT: 224 LBS | RESPIRATION RATE: 20 BRPM | OXYGEN SATURATION: 99 %

## 2021-11-05 DIAGNOSIS — I10 HYPERTENSION, UNSPECIFIED TYPE: ICD-10-CM

## 2021-11-05 DIAGNOSIS — R11.2 NAUSEA VOMITING AND DIARRHEA: Primary | ICD-10-CM

## 2021-11-05 DIAGNOSIS — R19.7 NAUSEA VOMITING AND DIARRHEA: Primary | ICD-10-CM

## 2021-11-05 LAB
ALBUMIN SERPL BCP-MCNC: 3.9 G/DL (ref 3.5–5.2)
ALP SERPL-CCNC: 50 U/L (ref 55–135)
ALT SERPL W/O P-5'-P-CCNC: 24 U/L (ref 10–44)
ANION GAP SERPL CALC-SCNC: 10 MMOL/L (ref 8–16)
AST SERPL-CCNC: 24 U/L (ref 10–40)
B-HCG UR QL: NEGATIVE
BACTERIA #/AREA URNS HPF: ABNORMAL /HPF
BASOPHILS # BLD AUTO: 0.01 K/UL (ref 0–0.2)
BASOPHILS NFR BLD: 0.2 % (ref 0–1.9)
BILIRUB SERPL-MCNC: 0.2 MG/DL (ref 0.1–1)
BILIRUB UR QL STRIP: NEGATIVE
BUN SERPL-MCNC: 12 MG/DL (ref 6–20)
CALCIUM SERPL-MCNC: 9.4 MG/DL (ref 8.7–10.5)
CHLORIDE SERPL-SCNC: 107 MMOL/L (ref 95–110)
CLARITY UR: CLEAR
CO2 SERPL-SCNC: 23 MMOL/L (ref 23–29)
COLOR UR: YELLOW
CREAT SERPL-MCNC: 0.8 MG/DL (ref 0.5–1.4)
CTP QC/QA: YES
DIFFERENTIAL METHOD: ABNORMAL
EOSINOPHIL # BLD AUTO: 0 K/UL (ref 0–0.5)
EOSINOPHIL NFR BLD: 0.4 % (ref 0–8)
ERYTHROCYTE [DISTWIDTH] IN BLOOD BY AUTOMATED COUNT: 15.3 % (ref 11.5–14.5)
EST. GFR  (AFRICAN AMERICAN): >60 ML/MIN/1.73 M^2
EST. GFR  (NON AFRICAN AMERICAN): >60 ML/MIN/1.73 M^2
GLUCOSE SERPL-MCNC: 141 MG/DL (ref 70–110)
GLUCOSE UR QL STRIP: NEGATIVE
HCT VFR BLD AUTO: 41.7 % (ref 37–48.5)
HGB BLD-MCNC: 12.9 G/DL (ref 12–16)
HGB UR QL STRIP: ABNORMAL
HYALINE CASTS #/AREA URNS LPF: 0 /LPF
IMM GRANULOCYTES # BLD AUTO: 0.02 K/UL (ref 0–0.04)
IMM GRANULOCYTES NFR BLD AUTO: 0.4 % (ref 0–0.5)
KETONES UR QL STRIP: NEGATIVE
LEUKOCYTE ESTERASE UR QL STRIP: ABNORMAL
LIPASE SERPL-CCNC: 34 U/L (ref 4–60)
LYMPHOCYTES # BLD AUTO: 2 K/UL (ref 1–4.8)
LYMPHOCYTES NFR BLD: 34.8 % (ref 18–48)
MCH RBC QN AUTO: 26.9 PG (ref 27–31)
MCHC RBC AUTO-ENTMCNC: 30.9 G/DL (ref 32–36)
MCV RBC AUTO: 87 FL (ref 82–98)
MICROSCOPIC COMMENT: ABNORMAL
MONOCYTES # BLD AUTO: 0.5 K/UL (ref 0.3–1)
MONOCYTES NFR BLD: 9.4 % (ref 4–15)
NEUTROPHILS # BLD AUTO: 3.1 K/UL (ref 1.8–7.7)
NEUTROPHILS NFR BLD: 54.8 % (ref 38–73)
NITRITE UR QL STRIP: NEGATIVE
NRBC BLD-RTO: 0 /100 WBC
PH UR STRIP: 6 [PH] (ref 5–8)
PLATELET # BLD AUTO: 263 K/UL (ref 150–450)
PMV BLD AUTO: 10.9 FL (ref 9.2–12.9)
POCT GLUCOSE: 116 MG/DL (ref 70–110)
POTASSIUM SERPL-SCNC: 4.6 MMOL/L (ref 3.5–5.1)
PROT SERPL-MCNC: 8.1 G/DL (ref 6–8.4)
PROT UR QL STRIP: NEGATIVE
RBC # BLD AUTO: 4.79 M/UL (ref 4–5.4)
RBC #/AREA URNS HPF: 1 /HPF (ref 0–4)
SODIUM SERPL-SCNC: 140 MMOL/L (ref 136–145)
SP GR UR STRIP: 1.02 (ref 1–1.03)
SQUAMOUS #/AREA URNS HPF: 8 /HPF
URN SPEC COLLECT METH UR: ABNORMAL
UROBILINOGEN UR STRIP-ACNC: NEGATIVE EU/DL
WBC # BLD AUTO: 5.66 K/UL (ref 3.9–12.7)
WBC #/AREA URNS HPF: 6 /HPF (ref 0–5)

## 2021-11-05 PROCEDURE — 82962 GLUCOSE BLOOD TEST: CPT

## 2021-11-05 PROCEDURE — 83690 ASSAY OF LIPASE: CPT | Performed by: EMERGENCY MEDICINE

## 2021-11-05 PROCEDURE — 99284 EMERGENCY DEPT VISIT MOD MDM: CPT | Mod: 25

## 2021-11-05 PROCEDURE — 81025 URINE PREGNANCY TEST: CPT | Performed by: EMERGENCY MEDICINE

## 2021-11-05 PROCEDURE — 25000003 PHARM REV CODE 250: Performed by: EMERGENCY MEDICINE

## 2021-11-05 PROCEDURE — 63600175 PHARM REV CODE 636 W HCPCS: Performed by: EMERGENCY MEDICINE

## 2021-11-05 PROCEDURE — 85025 COMPLETE CBC W/AUTO DIFF WBC: CPT | Performed by: EMERGENCY MEDICINE

## 2021-11-05 PROCEDURE — 96361 HYDRATE IV INFUSION ADD-ON: CPT

## 2021-11-05 PROCEDURE — 80053 COMPREHEN METABOLIC PANEL: CPT | Performed by: EMERGENCY MEDICINE

## 2021-11-05 PROCEDURE — 96374 THER/PROPH/DIAG INJ IV PUSH: CPT

## 2021-11-05 PROCEDURE — 81000 URINALYSIS NONAUTO W/SCOPE: CPT | Performed by: EMERGENCY MEDICINE

## 2021-11-05 RX ORDER — ONDANSETRON 2 MG/ML
4 INJECTION INTRAMUSCULAR; INTRAVENOUS
Status: COMPLETED | OUTPATIENT
Start: 2021-11-05 | End: 2021-11-05

## 2021-11-05 RX ORDER — ONDANSETRON 4 MG/1
4 TABLET, ORALLY DISINTEGRATING ORAL EVERY 8 HOURS PRN
Qty: 12 TABLET | Refills: 0 | Status: SHIPPED | OUTPATIENT
Start: 2021-11-05

## 2021-11-05 RX ORDER — DICYCLOMINE HYDROCHLORIDE 10 MG/1
20 CAPSULE ORAL
Status: COMPLETED | OUTPATIENT
Start: 2021-11-05 | End: 2021-11-05

## 2021-11-05 RX ORDER — MORPHINE SULFATE 2 MG/ML
2 INJECTION, SOLUTION INTRAMUSCULAR; INTRAVENOUS
Status: DISCONTINUED | OUTPATIENT
Start: 2021-11-05 | End: 2021-11-05 | Stop reason: HOSPADM

## 2021-11-05 RX ADMIN — DICYCLOMINE HYDROCHLORIDE 20 MG: 10 CAPSULE ORAL at 10:11

## 2021-11-05 RX ADMIN — SODIUM CHLORIDE 1000 ML: 0.9 INJECTION, SOLUTION INTRAVENOUS at 08:11

## 2021-11-05 RX ADMIN — ONDANSETRON 4 MG: 2 INJECTION INTRAMUSCULAR; INTRAVENOUS at 08:11

## 2022-07-24 ENCOUNTER — CLINICAL SUPPORT (OUTPATIENT)
Dept: URGENT CARE | Facility: CLINIC | Age: 37
End: 2022-07-24
Payer: MEDICAID

## 2022-07-24 ENCOUNTER — OFFICE VISIT (OUTPATIENT)
Dept: URGENT CARE | Facility: CLINIC | Age: 37
End: 2022-07-24
Payer: MEDICAID

## 2022-07-24 VITALS
SYSTOLIC BLOOD PRESSURE: 127 MMHG | HEART RATE: 104 BPM | DIASTOLIC BLOOD PRESSURE: 86 MMHG | WEIGHT: 240 LBS | BODY MASS INDEX: 45.31 KG/M2 | HEIGHT: 61 IN | RESPIRATION RATE: 18 BRPM | OXYGEN SATURATION: 100 % | TEMPERATURE: 102 F

## 2022-07-24 DIAGNOSIS — U07.1 COVID: Primary | ICD-10-CM

## 2022-07-24 DIAGNOSIS — J02.9 SORE THROAT: Primary | ICD-10-CM

## 2022-07-24 DIAGNOSIS — R05.9 COUGH: ICD-10-CM

## 2022-07-24 LAB
CTP QC/QA: YES
SARS-COV-2 RDRP RESP QL NAA+PROBE: POSITIVE

## 2022-07-24 PROCEDURE — 99203 PR OFFICE/OUTPT VISIT, NEW, LEVL III, 30-44 MIN: ICD-10-PCS | Mod: S$GLB,,,

## 2022-07-24 PROCEDURE — 4010F PR ACE/ARB THEARPY RXD/TAKEN: ICD-10-PCS | Mod: CPTII,S$GLB,,

## 2022-07-24 PROCEDURE — 3079F PR MOST RECENT DIASTOLIC BLOOD PRESSURE 80-89 MM HG: ICD-10-PCS | Mod: CPTII,S$GLB,,

## 2022-07-24 PROCEDURE — 1159F MED LIST DOCD IN RCRD: CPT | Mod: CPTII,S$GLB,,

## 2022-07-24 PROCEDURE — 4010F ACE/ARB THERAPY RXD/TAKEN: CPT | Mod: CPTII,S$GLB,,

## 2022-07-24 PROCEDURE — 3079F DIAST BP 80-89 MM HG: CPT | Mod: CPTII,S$GLB,,

## 2022-07-24 PROCEDURE — U0002 COVID-19 LAB TEST NON-CDC: HCPCS | Mod: QW,S$GLB,,

## 2022-07-24 PROCEDURE — 3074F PR MOST RECENT SYSTOLIC BLOOD PRESSURE < 130 MM HG: ICD-10-PCS | Mod: CPTII,S$GLB,,

## 2022-07-24 PROCEDURE — 1160F PR REVIEW ALL MEDS BY PRESCRIBER/CLIN PHARMACIST DOCUMENTED: ICD-10-PCS | Mod: CPTII,S$GLB,,

## 2022-07-24 PROCEDURE — 3008F BODY MASS INDEX DOCD: CPT | Mod: CPTII,S$GLB,,

## 2022-07-24 PROCEDURE — 3008F PR BODY MASS INDEX (BMI) DOCUMENTED: ICD-10-PCS | Mod: CPTII,S$GLB,,

## 2022-07-24 PROCEDURE — 99203 OFFICE O/P NEW LOW 30 MIN: CPT | Mod: S$GLB,,,

## 2022-07-24 PROCEDURE — 1160F RVW MEDS BY RX/DR IN RCRD: CPT | Mod: CPTII,S$GLB,,

## 2022-07-24 PROCEDURE — U0002: ICD-10-PCS | Mod: QW,S$GLB,,

## 2022-07-24 PROCEDURE — 3074F SYST BP LT 130 MM HG: CPT | Mod: CPTII,S$GLB,,

## 2022-07-24 PROCEDURE — 1159F PR MEDICATION LIST DOCUMENTED IN MEDICAL RECORD: ICD-10-PCS | Mod: CPTII,S$GLB,,

## 2022-07-24 RX ORDER — PROMETHAZINE HYDROCHLORIDE AND DEXTROMETHORPHAN HYDROBROMIDE 6.25; 15 MG/5ML; MG/5ML
5 SYRUP ORAL NIGHTLY PRN
Qty: 180 ML | Refills: 0 | Status: SHIPPED | OUTPATIENT
Start: 2022-07-24 | End: 2022-08-03

## 2022-07-24 RX ORDER — ATORVASTATIN CALCIUM 40 MG/1
40 TABLET, FILM COATED ORAL DAILY
COMMUNITY
Start: 2022-07-09

## 2022-07-24 RX ORDER — LORATADINE 10 MG/1
TABLET ORAL
COMMUNITY

## 2022-07-24 NOTE — PROGRESS NOTES
"Subjective:       Patient ID: Melissa Carvalho is a 36 y.o. female.    Vitals:  height is 5' 1" (1.549 m) and weight is 108.9 kg (240 lb). Her temperature is 101.8 °F (38.8 °C) (abnormal). Her blood pressure is 127/86 and her pulse is 104. Her respiration is 18 and oxygen saturation is 100%.     Chief Complaint: COVID-19 Concerns    Patient just tested positive for covid-19 as a nurse visit.   Patient is requesting anti-viral.   Pt states when she coughs it hurts.     Other  This is a new problem. The current episode started today. The problem occurs rarely. The problem has been unchanged. Associated symptoms include chills, congestion, coughing, fatigue, a fever, headaches and a sore throat. Pertinent negatives include no abdominal pain, anorexia, arthralgias, change in bowel habit, chest pain, diaphoresis, joint swelling, myalgias, nausea, neck pain, numbness, rash, swollen glands, urinary symptoms, vertigo, visual change, vomiting or weakness. She has tried acetaminophen (Dayquil,Vitamin) for the symptoms. The treatment provided mild relief.       Constitution: Positive for chills, fatigue and fever. Negative for sweating.   HENT: Positive for congestion and sore throat.    Neck: Negative for neck pain.   Cardiovascular: Negative for chest pain.   Respiratory: Positive for cough.    Gastrointestinal: Negative for abdominal pain, nausea and vomiting.   Musculoskeletal: Negative for joint pain, joint swelling and muscle ache.   Skin: Negative for rash.   Neurological: Positive for headaches. Negative for history of vertigo and numbness.       Objective:      Physical Exam   Constitutional: She is oriented to person, place, and time. She appears well-developed. She is cooperative.  Non-toxic appearance. She does not appear ill. No distress.      Comments:Sitting comfortably in exam room, well appearing. NAD  Able to talk in complete sentences without difficulty or pause       HENT:   Head: Normocephalic and " atraumatic.   Ears:   Right Ear: Hearing and external ear normal.   Left Ear: Hearing and external ear normal.   Nose: No epistaxis.   Eyes: Conjunctivae and lids are normal. No scleral icterus.   Neck: Trachea normal and phonation normal. Neck supple. No edema present. No erythema present. No neck rigidity present.   Cardiovascular: Normal rate, regular rhythm, normal heart sounds and normal pulses.   Pulmonary/Chest: Effort normal and breath sounds normal. No respiratory distress. She has no decreased breath sounds. She has no rhonchi.   Abdominal: Normal appearance.   Musculoskeletal: Normal range of motion.         General: No deformity. Normal range of motion.   Neurological: She is alert and oriented to person, place, and time. She exhibits normal muscle tone. Coordination normal.   Skin: Skin is warm, dry, intact, not diaphoretic and not pale.   Psychiatric: Her speech is normal and behavior is normal. Judgment and thought content normal.   Nursing note and vitals reviewed.    Results for orders placed or performed in visit on 07/24/22   POCT COVID-19 Rapid Screening   Result Value Ref Range    POC Rapid COVID Positive (A) Negative     Acceptable Yes      1        Assessment:       1. COVID    2. Cough          Plan:         Risk score of 1 . Discussed risks vs benefits and side effects of Paxlovid. Patient would like to proceed with treatment with Paxlovid. Drug interactions reviewed and documented in AVS-- . She verbalized understanding to the above.     Reviewed positive covid test.  Discussed results with patient and proper quarantine based on CDC guidelines.   Discussed use of OTC medications for symptom control as this is a viral disease.   All ER precautions covered including but not limited to shortness of breath, intractable fever, or chest pain.  Discussed RTC if symptoms worsen, change, or persist.     Patient verbalized understanding and agreed with the plan.     Ning Quintero  PA-C          COVID  -     nirmatrelvir-ritonavir 300 mg (150 mg x 2)-100 mg copackaged tablets (EUA); Take 3 tablets by mouth 2 (two) times daily for 5 days. Each dose contains 2 nirmatrelvir (pink tablets) and 1 ritonavir (white tablet). Take all 3 tablets together  Dispense: 30 tablet; Refill: 0    Cough  -     promethazine-dextromethorphan (PROMETHAZINE-DM) 6.25-15 mg/5 mL Syrp; Take 5 mLs by mouth nightly as needed.  Dispense: 180 mL; Refill: 0         Patient Instructions   You will have to stop Atorvastatin while you take Paxlovid and for 3 days after you complete it as well.       You have been prescribed Paxlovid, which is an antiviral medication.     Oral antiviral treatment may help your body fight COVID-19 by stopping the SARS-CoV-2 virus (the virus that causes COVID-19) from multiplying in your body, lowering the amount of the virus within your body, or helping your immune system. By getting treatment, you could have less serious symptoms and may lower the chances of your illness getting worse and needing care in the hospital.    To be eligible for Paxlovid, you must be at least 12 years of age and weigh at least 88 pounds.    What is PAXLOVID?    PAXLOVID is an investigational medicine used to treat mild-to-moderate COVID-19 in  adults and children [12 years of age and older weighing at least 88 pounds (40 kg)] with  positive results of direct SARS-CoV-2 viral testing, and who are at high risk for  progression to severe COVID-19, including hospitalization or death. PAXLOVID is  investigational because it is still being studied. There is limited information about the  safety and effectiveness of using PAXLOVID to treat people with mild-to-moderate  COVID-19.  The FDA has authorized the emergency use of PAXLOVID for the treatment of mild-tomoderate COVID-19 in adults and children [12 years of age and older weighing at least  88 pounds (40 kg)] with a positive test for the virus that causes COVID-19, and  who are  at high risk for progression to severe COVID-19, including hospitalization or death,  under an EUA.    Tell your healthcare provider if you:    Have any allergies    Have liver or kidney disease    Are pregnant or plan to become pregnant    Are breastfeeding a child    Have any serious illnesses  Tell your healthcare provider about all the medicines you take, including  prescription and over-the-counter medicines, vitamins, and herbal supplements.  Some medicines may interact with PAXLOVID and may cause serious side effects.  Keep a list of your medicines to show your healthcare provider and pharmacist when  you get a new medicine.  You can ask your healthcare provider or pharmacist for a list of medicines that interact  with PAXLOVID. Do not start taking a new medicine without telling your  healthcare provider. Your healthcare provider can tell you if it is safe to take  PAXLOVID with other medicines    How do I take PAXLOVID?   PAXLOVID consists of 2 medicines: nirmatrelvir and ritonavir.   Take 2 pink tablets of nirmatrelvir with 1 white tablet of ritonavir by mouth  2 times each day (in the morning and in the evening) for 5 days. For each  dose, take all 3 tablets at the same time.   If you have kidney disease, talk to your healthcare provider. You  may need a different dose.   Swallow the tablets whole. Do not chew, break, or crush the tablets.   Take PAXLOVID with or without food.   Do not stop taking PAXLOVID without talking to your healthcare provider, even if  you feel better.   If you miss a dose of PAXLOVID within 8 hours of the time it is usually taken,  take it as soon as you remember. If you miss a dose by more than 8 hours, skip  the missed dose and take the next dose at your regular time. Do not take  2 doses of PAXLOVID at the same time.   If you take too much PAXLOVID, call your healthcare provider or go to the  nearest hospital emergency room right away.   If you are  taking a ritonavir- or cobicistat-containing medicine to treat hepatitis C  or Human Immunodeficiency Virus (HIV), you should continue to take your  medicine as prescribed by your healthcare provider    Possible side effects of PAXLOVID are:   Allergic Reactions. Allergic reactions can happen in people taking  PAXLOVID, even after only 1 dose. Stop taking PAXLOVID and call your  healthcare provider right away if you get any of the following symptoms of an  allergic reaction:   hives   trouble swallowing or breathing   swelling of the mouth, lips, or face   throat tightness   hoarseness   skin rash   Liver Problems. Tell your healthcare provider right away if you have any of  these signs and symptoms of liver problems: loss of appetite, yellowing of your  skin and the whites of eyes (jaundice), dark-colored urine, pale colored stools  and itchy skin, stomach area (abdominal) pain.   Resistance to HIV Medicines: If you have untreated HIV infection, PAXLOVID  may lead to some HIV medicines not working as well in the future.   Other possible side effects include:   altered sense of taste   diarrhea   high blood pressure   muscle aches  These are not all the possible side effects of PAXLOVID. Not many people have taken  PAXLOVID. Serious and unexpected side effects may happen. PAXLOVID is still being  studied, so it is possible that all of the risks are not known at this time.                You have tested positive for COVID-19 today.      ISOLATION  If you tested positive and do not have symptoms, you must isolate for 5 days starting on the day of the positive test. I    If you tested positive and have symptoms, you must isolate for 5 days starting on the day of the first symptoms,  not the day of the positive test.     This is the most important part, both the CDC and the [facility] emphasize that you do not test out of isolation.     After 5 days, if your symptoms have improved and you have not had fever on  day 5, you can return to the community on day 6- NO TESTING REQUIRED!      In fact, we do not retest if you were positive in the last 90 days.    After your 5 days of isolation are completed, the CDC recommends strict mask use for the first 5 days that you come out of isolation. PLEASE FOLLOW CDC GUIDELINES REGARDING TRAVEL AFTER COVID INFECTION.     Return to Urgent Care or go to ER if symptoms worsen or fail to improve.  Follow up with PCP as recommended for further management.     Your symptoms should begin to improve by Day 5 of the infection-- if symptoms worsen, you develop a new fever, shortness of breath, worsening shortness of breath with activity, chest pain, worsening cough, you must return to Urgent Care or go to the ER.    PLEASE READ YOUR DISCHARGE INSTRUCTIONS ENTIRELY AS IT CONTAINS IMPORTANT INFORMATION.      Please drink plenty of fluids.    Please get plenty of rest.    Please return here or go to the Emergency Department for any concerns or worsening of condition.      Tylenol or ibuprofen can also be used as directed for pain and fever unless you have an allergy to them or medical condition such as stomach ulcers, kidney or liver disease or blood thinners etc for which you should not be taking these type of medications.   YOU CAN ALTERNATE TYLENOL AND IBUPROFEN EVERY 3-4 HOURS. Take 1000mg (2 pills) of Extra Strength Acetaminophen (Tylenol) every 6 hours and 600mg (3 pills) of Ibuprofen (Motrin/Advil) every 6 hours, alternating the two so that every 3 hours you take one or the other. Start with the Tylenol, then 3 hours later take the Ibuprofen, then 3 hours later take the Tylenol again, and so on.         For your allergy symptoms and/or runny nose, sinus/ear pressure, congestion:        - You can take over-the-counter claritin, zyrtec, allegra, or xyzal as directed. These are antihistamines that can help with runny nose, nasal congestion, sneezing, and helps to dry up post-nasal drip, which  usually causes sore throat and cough.               - If you do NOT have high blood pressure, you may use a decongestant form (D)  of this medication (ie. Claritin- D, zyrtec-D, allegra-D) or if you do not take the D form, you can take sudafed (pseudoephedrine) over the counter, which is a decongestant. Do NOT take two decongestant (D) medications at the same time (such as mucinex-D and claritin-D or plain sudafed and claritin D). Dextromethorphan (DM) is a cough suppressant over the counter (ie. mucinex DM, robitussin, delsym; dayquil/nyquil has DM as well.)    -If you DO have high blood pressure, AVOID DECONGESTANTS (I.e., Phenylephrine and Pseudoephedrine). You may take Coricidin HBP for sinus congestion and Delsym for cough.        - You can take plain Mucinex (guaifenesin) 1200 mg twice a day to help loosen mucous.       Use over the counter flonase or nasocort: one spray each nostril twice daily OR two sprays each nostril once daily until nares dry out, unless you have Glaucoma.   If you find this dries your nose out or your nose bleeds, try using over the counter nasal saline a few minutes prior to using the flonase to moisten the lining of your nose and throughout the day as needed.   Flonase/nasal spray use directions:  1) Use once per day.  2) Blow nose first.  3) Close one nostril and spray flonase up the other nostril while inhaling gently.   4) If you inhale to aggressively, you will have a bitter taste in the back of your mouth.       You can try breathe right strips at night to help you breathe.  A cool mist humidifier in bedroom may help with cough and relieve stuffy nose.     Sinus rinses DO NOT USE TAP WATER, if you must, water must be a rolling boil for 1 minute, let it cool, then use.  May use distilled water, or over the counter nasal saline rinses.  Vics vapor rub in shower to help open nasal passages.  May use nasal gel to keep passages moisturized.  May use Nasal saline sprays during the day  for added relief of congestion.   For those who go to the gym, please do not use the sauna or steam room now to clear sinuses.      Sore throat recommendations: Warm fluids, warm salt water gargles, throat lozenges, tea, honey, soup, rest, hydration.      Cough     Honey with tim to soothe your throat    Robitussin or Delsyum for cough suppressant for dry cough.    Mucinex DM or products containing Guaifenesin or Dextromethorphan for expectorant (wet cough).    Take prescription cough meds (pills) as prescribed; take prescription cough syrup at night as needed for cough.  Do not take both the prescribed cough pills and syrup at the same time or within 6 hours of each other.  Do not take the cough syrup with any other sedative medication as it can can cause drowsiness. Do not operate any heavy machinery, drink or drive while taking the cough syrup.    Try taking half a dose first of the cough syrup to see how it affects you.       Please follow up with your primary care doctor or specialist in the next 48-72hrs as needed and if no improvement    If you  smoke, please stop smoking.      Please return or see your primary care doctor if you develop new or worsening symptoms.     Please arrange follow up with your primary medical clinic as soon as possible. You must understand that you've received an Urgent Care treatment only and that you may be released before all of your medical problems are known or treated. You, the patient, will arrange for follow up as instructed. If your symptoms worsen or fail to improve you should go to the Emergency Room.

## 2022-07-24 NOTE — PATIENT INSTRUCTIONS
You will have to stop Atorvastatin while you take Paxlovid and for 3 days after you complete it as well.       You have been prescribed Paxlovid, which is an antiviral medication.     Oral antiviral treatment may help your body fight COVID-19 by stopping the SARS-CoV-2 virus (the virus that causes COVID-19) from multiplying in your body, lowering the amount of the virus within your body, or helping your immune system. By getting treatment, you could have less serious symptoms and may lower the chances of your illness getting worse and needing care in the hospital.    To be eligible for Paxlovid, you must be at least 12 years of age and weigh at least 88 pounds.    What is PAXLOVID?    PAXLOVID is an investigational medicine used to treat mild-to-moderate COVID-19 in  adults and children [12 years of age and older weighing at least 88 pounds (40 kg)] with  positive results of direct SARS-CoV-2 viral testing, and who are at high risk for  progression to severe COVID-19, including hospitalization or death. PAXLOVID is  investigational because it is still being studied. There is limited information about the  safety and effectiveness of using PAXLOVID to treat people with mild-to-moderate  COVID-19.  The FDA has authorized the emergency use of PAXLOVID for the treatment of mild-tomoderate COVID-19 in adults and children [12 years of age and older weighing at least  88 pounds (40 kg)] with a positive test for the virus that causes COVID-19, and who are  at high risk for progression to severe COVID-19, including hospitalization or death,  under an EUA.    Tell your healthcare provider if you:  ? Have any allergies  ? Have liver or kidney disease  ? Are pregnant or plan to become pregnant  ? Are breastfeeding a child  ? Have any serious illnesses  Tell your healthcare provider about all the medicines you take, including  prescription and over-the-counter medicines, vitamins, and herbal supplements.  Some medicines may  interact with PAXLOVID and may cause serious side effects.  Keep a list of your medicines to show your healthcare provider and pharmacist when  you get a new medicine.  You can ask your healthcare provider or pharmacist for a list of medicines that interact  with PAXLOVID. Do not start taking a new medicine without telling your  healthcare provider. Your healthcare provider can tell you if it is safe to take  PAXLOVID with other medicines    How do I take PAXLOVID?  ? PAXLOVID consists of 2 medicines: nirmatrelvir and ritonavir.  Take 2 pink tablets of nirmatrelvir with 1 white tablet of ritonavir by mouth  2 times each day (in the morning and in the evening) for 5 days. For each  dose, take all 3 tablets at the same time.  If you have kidney disease, talk to your healthcare provider. You  may need a different dose.  Swallow the tablets whole. Do not chew, break, or crush the tablets.  Take PAXLOVID with or without food.  Do not stop taking PAXLOVID without talking to your healthcare provider, even if  you feel better.  If you miss a dose of PAXLOVID within 8 hours of the time it is usually taken,  take it as soon as you remember. If you miss a dose by more than 8 hours, skip  the missed dose and take the next dose at your regular time. Do not take  2 doses of PAXLOVID at the same time.  If you take too much PAXLOVID, call your healthcare provider or go to the  nearest hospital emergency room right away.  If you are taking a ritonavir- or cobicistat-containing medicine to treat hepatitis C  or Human Immunodeficiency Virus (HIV), you should continue to take your  medicine as prescribed by your healthcare provider    Possible side effects of PAXLOVID are:  ? Allergic Reactions. Allergic reactions can happen in people taking  PAXLOVID, even after only 1 dose. Stop taking PAXLOVID and call your  healthcare provider right away if you get any of the following symptoms of an  allergic reaction:  hives  trouble swallowing  or breathing  swelling of the mouth, lips, or face  throat tightness  hoarseness  skin rash  ? Liver Problems. Tell your healthcare provider right away if you have any of  these signs and symptoms of liver problems: loss of appetite, yellowing of your  skin and the whites of eyes (jaundice), dark-colored urine, pale colored stools  and itchy skin, stomach area (abdominal) pain.  ? Resistance to HIV Medicines: If you have untreated HIV infection, PAXLOVID  may lead to some HIV medicines not working as well in the future.  ? Other possible side effects include:  altered sense of taste  diarrhea  high blood pressure  muscle aches  These are not all the possible side effects of PAXLOVID. Not many people have taken  PAXLOVID. Serious and unexpected side effects may happen. PAXLOVID is still being  studied, so it is possible that all of the risks are not known at this time.                You have tested positive for COVID-19 today.      ISOLATION  If you tested positive and do not have symptoms, you must isolate for 5 days starting on the day of the positive test. I    If you tested positive and have symptoms, you must isolate for 5 days starting on the day of the first symptoms,  not the day of the positive test.     This is the most important part, both the CDC and the [facility] emphasize that you do not test out of isolation.     After 5 days, if your symptoms have improved and you have not had fever on day 5, you can return to the community on day 6- NO TESTING REQUIRED!      In fact, we do not retest if you were positive in the last 90 days.    After your 5 days of isolation are completed, the CDC recommends strict mask use for the first 5 days that you come out of isolation. PLEASE FOLLOW CDC GUIDELINES REGARDING TRAVEL AFTER COVID INFECTION.    Return to Urgent Care or go to ER if symptoms worsen or fail to improve.  Follow up with PCP as recommended for further management.     Your symptoms should begin to improve by  Day 5 of the infection-- if symptoms worsen, you develop a new fever, shortness of breath, worsening shortness of breath with activity, chest pain, worsening cough, you must return to Urgent Care or go to the ER.    PLEASE READ YOUR DISCHARGE INSTRUCTIONS ENTIRELY AS IT CONTAINS IMPORTANT INFORMATION.      Please drink plenty of fluids.    Please get plenty of rest.    Please return here or go to the Emergency Department for any concerns or worsening of condition.      Tylenol or ibuprofen can also be used as directed for pain and fever unless you have an allergy to them or medical condition such as stomach ulcers, kidney or liver disease or blood thinners etc for which you should not be taking these type of medications.   YOU CAN ALTERNATE TYLENOL AND IBUPROFEN EVERY 3-4 HOURS. Take 1000mg (2 pills) of Extra Strength Acetaminophen (Tylenol) every 6 hours and 600mg (3 pills) of Ibuprofen (Motrin/Advil) every 6 hours, alternating the two so that every 3 hours you take one or the other. Start with the Tylenol, then 3 hours later take the Ibuprofen, then 3 hours later take the Tylenol again, and so on.         For your allergy symptoms and/or runny nose, sinus/ear pressure, congestion:        - You can take over-the-counter claritin, zyrtec, allegra, or xyzal as directed. These are antihistamines that can help with runny nose, nasal congestion, sneezing, and helps to dry up post-nasal drip, which usually causes sore throat and cough.               - If you do NOT have high blood pressure, you may use a decongestant form (D)  of this medication (ie. Claritin- D, zyrtec-D, allegra-D) or if you do not take the D form, you can take sudafed (pseudoephedrine) over the counter, which is a decongestant. Do NOT take two decongestant (D) medications at the same time (such as mucinex-D and claritin-D or plain sudafed and claritin D). Dextromethorphan (DM) is a cough suppressant over the counter (ie. mucinex DM, robitussin,  delsym; dayquil/nyquil has DM as well.)    -If you DO have high blood pressure, AVOID DECONGESTANTS (I.e., Phenylephrine and Pseudoephedrine). You may take Coricidin HBP for sinus congestion and Delsym for cough.        - You can take plain Mucinex (guaifenesin) 1200 mg twice a day to help loosen mucous.       Use over the counter flonase or nasocort: one spray each nostril twice daily OR two sprays each nostril once daily until nares dry out, unless you have Glaucoma.   If you find this dries your nose out or your nose bleeds, try using over the counter nasal saline a few minutes prior to using the flonase to moisten the lining of your nose and throughout the day as needed.   Flonase/nasal spray use directions:  1) Use once per day.  2) Blow nose first.  3) Close one nostril and spray flonase up the other nostril while inhaling gently.   4) If you inhale to aggressively, you will have a bitter taste in the back of your mouth.       You can try breathe right strips at night to help you breathe.  A cool mist humidifier in bedroom may help with cough and relieve stuffy nose.     Sinus rinses DO NOT USE TAP WATER, if you must, water must be a rolling boil for 1 minute, let it cool, then use.  May use distilled water, or over the counter nasal saline rinses.  Vics vapor rub in shower to help open nasal passages.  May use nasal gel to keep passages moisturized.  May use Nasal saline sprays during the day for added relief of congestion.   For those who go to the gym, please do not use the sauna or steam room now to clear sinuses.      Sore throat recommendations: Warm fluids, warm salt water gargles, throat lozenges, tea, honey, soup, rest, hydration.      Cough     Honey with tim to soothe your throat    Robitussin or Delsyum for cough suppressant for dry cough.    Mucinex DM or products containing Guaifenesin or Dextromethorphan for expectorant (wet cough).    Take prescription cough meds (pills) as prescribed; take  prescription cough syrup at night as needed for cough.  Do not take both the prescribed cough pills and syrup at the same time or within 6 hours of each other.  Do not take the cough syrup with any other sedative medication as it can can cause drowsiness. Do not operate any heavy machinery, drink or drive while taking the cough syrup.    Try taking half a dose first of the cough syrup to see how it affects you.       Please follow up with your primary care doctor or specialist in the next 48-72hrs as needed and if no improvement    If you  smoke, please stop smoking.      Please return or see your primary care doctor if you develop new or worsening symptoms.     Please arrange follow up with your primary medical clinic as soon as possible. You must understand that you've received an Urgent Care treatment only and that you may be released before all of your medical problems are known or treated. You, the patient, will arrange for follow up as instructed. If your symptoms worsen or fail to improve you should go to the Emergency Room.

## 2022-09-01 ENCOUNTER — IMMUNIZATION (OUTPATIENT)
Dept: PHARMACY | Facility: CLINIC | Age: 37
End: 2022-09-01
Payer: MEDICAID

## 2022-09-01 DIAGNOSIS — Z23 NEED FOR VACCINATION: Primary | ICD-10-CM

## 2023-04-13 ENCOUNTER — CLINICAL SUPPORT (OUTPATIENT)
Dept: URGENT CARE | Facility: CLINIC | Age: 38
End: 2023-04-13
Payer: MEDICAID

## 2023-04-13 DIAGNOSIS — R68.83 CHILLS: Primary | ICD-10-CM

## 2023-04-13 LAB
CTP QC/QA: YES
SARS-COV-2 AG RESP QL IA.RAPID: POSITIVE

## 2023-04-13 PROCEDURE — 87811 SARS-COV-2 COVID19 W/OPTIC: CPT | Mod: QW,S$GLB,, | Performed by: FAMILY MEDICINE

## 2023-04-13 PROCEDURE — 87811 SARS CORONAVIRUS 2 ANTIGEN POCT, MANUAL READ: ICD-10-PCS | Mod: QW,S$GLB,, | Performed by: FAMILY MEDICINE

## 2023-09-10 NOTE — ED NOTES
Ct head now   Melissa Carvalho presents to the ED with c/o diarrhea that started on Saturday. Patient reports approximately 2 episodes a day with abd cramping with the onset of diarrhea. Patient reports taking imodium at home with an abundant amount of water and gatorade. Patient denies any ill contacts, new meds or new foods. AAOx3. Last episode was this morning at 0900. Mucous membranes are pink and moist. Skin is warm, dry and intact.

## 2023-11-10 ENCOUNTER — IMMUNIZATION (OUTPATIENT)
Dept: FAMILY MEDICINE | Facility: CLINIC | Age: 38
End: 2023-11-10
Payer: MEDICAID

## 2023-11-10 DIAGNOSIS — Z23 NEED FOR VACCINATION: Primary | ICD-10-CM

## 2023-11-10 PROCEDURE — 99999PBSHW FLU VACCINE (QUAD) GREATER THAN OR EQUAL TO 3YO PRESERVATIVE FREE IM: ICD-10-PCS | Mod: PBBFAC,,,

## 2023-11-10 PROCEDURE — 99999PBSHW FLU VACCINE (QUAD) GREATER THAN OR EQUAL TO 3YO PRESERVATIVE FREE IM: Mod: PBBFAC,,,

## 2023-11-10 PROCEDURE — 90686 IIV4 VACC NO PRSV 0.5 ML IM: CPT | Mod: PBBFAC,PO

## 2023-11-10 NOTE — PROGRESS NOTES
Two patient identifiers verified. Allergies reviewed.  FLU vaccine IM administered to Left deltoid per MD order. Patient tolerated injection well: no redness, bleeding, or bruising noted to injection site. Patient instructed to remain in clinic setting for 15 minutes.

## 2024-06-10 DIAGNOSIS — I50.22 CHRONIC SYSTOLIC HEART FAILURE: Primary | ICD-10-CM

## 2024-06-25 DIAGNOSIS — I50.22 CHRONIC SYSTOLIC HEART FAILURE: Primary | ICD-10-CM

## 2024-08-12 ENCOUNTER — TELEPHONE (OUTPATIENT)
Dept: BARIATRICS | Facility: CLINIC | Age: 39
End: 2024-08-12
Payer: COMMERCIAL

## 2024-08-12 NOTE — TELEPHONE ENCOUNTER
----- Message from Rebecca Mcclain sent at 8/12/2024  7:49 AM CDT -----  Regarding: pt advise  Contact: pt  Pt's Schedule For Surgery ,...  Pt would like to speak w/ you regarding Transferring Services From Another Facility, To [facility]    Please call to advise    Phone [phone]    Thank You

## 2024-08-12 NOTE — TELEPHONE ENCOUNTER
Called and spoke with the pt.  She is [employer] employee Tier 1. Scheduled FC to discuss costs and explained that she does not need to wait for it to be seen.  Pt was in workup with Dr. Freire but need to transfer care to [facility] due to her insurance. Researched pt's prior workup and added to snapshot.    Labs:8/9/23  Pathology 10/24/23 - Negative H. Pylori IHC stain with passing controls  CXR: 6/5/24  Echo Saline Bubble: 7/2/24  Echo: 6/11/24  EKG: In visit notes with Tani 6/5/24    Bariatric related appts:  8/9/23 MICHAEL Roa  8/23/23 ALEXANDRA Martinez RD  9/26/23 ALEXANDRA Martinez RD  10/17/23 Psych  5/29/24 ALEXANDRA Martinez RD  6/5/24 VALERIO Freire (visit notes contain recent EKG)  6/14/24 MAHENDRA Quintana RD  6/25/24 Cardiology YAIR Raymond (unable to pull notes form this visit, pt stated she will have his office fax clearance to us)  8/6/24 TERESO Mares PCP     I informed her that I could not view her preop visit with her Cardiologist.  She stated it was because he is at [facility].  She will have his office fax the clearance to [facility].  I provided the fax number.    Discussed the [facility] bariatric workup and pathways.  Scheduled her consult with ASYA and RD. She requested a Tuesday due to her being off on Tuesdays. Will send message with seminar instructions.      Provided update to Leia Mathew RN.

## 2024-08-27 ENCOUNTER — OFFICE VISIT (OUTPATIENT)
Dept: BARIATRICS | Facility: CLINIC | Age: 39
End: 2024-08-27
Payer: COMMERCIAL

## 2024-08-27 ENCOUNTER — CLINICAL SUPPORT (OUTPATIENT)
Dept: BARIATRICS | Facility: CLINIC | Age: 39
End: 2024-08-27
Payer: COMMERCIAL

## 2024-08-27 VITALS — HEART RATE: 79 BPM | OXYGEN SATURATION: 99 % | HEIGHT: 61 IN | BODY MASS INDEX: 44.62 KG/M2 | WEIGHT: 236.31 LBS

## 2024-08-27 DIAGNOSIS — E66.01 CLASS 3 SEVERE OBESITY DUE TO EXCESS CALORIES WITHOUT SERIOUS COMORBIDITY WITH BODY MASS INDEX (BMI) OF 40.0 TO 44.9 IN ADULT: Primary | ICD-10-CM

## 2024-08-27 DIAGNOSIS — Z71.3 DIETARY COUNSELING: Primary | ICD-10-CM

## 2024-08-27 DIAGNOSIS — E66.01 MORBID OBESITY WITH BMI OF 40.0-44.9, ADULT: ICD-10-CM

## 2024-08-27 DIAGNOSIS — E78.5 HYPERLIPIDEMIA, UNSPECIFIED HYPERLIPIDEMIA TYPE: ICD-10-CM

## 2024-08-27 DIAGNOSIS — G47.33 OSA ON CPAP: ICD-10-CM

## 2024-08-27 DIAGNOSIS — I10 PRIMARY HYPERTENSION: ICD-10-CM

## 2024-08-27 DIAGNOSIS — E11.9 TYPE 2 DIABETES MELLITUS WITHOUT COMPLICATION, WITH LONG-TERM CURRENT USE OF INSULIN: ICD-10-CM

## 2024-08-27 DIAGNOSIS — Z79.4 TYPE 2 DIABETES MELLITUS WITHOUT COMPLICATION, WITH LONG-TERM CURRENT USE OF INSULIN: ICD-10-CM

## 2024-08-27 DIAGNOSIS — E11.00 TYPE 2 DIABETES MELLITUS WITH HYPEROSMOLARITY WITHOUT COMA, WITH LONG-TERM CURRENT USE OF INSULIN: ICD-10-CM

## 2024-08-27 DIAGNOSIS — E78.2 MIXED HYPERLIPIDEMIA: ICD-10-CM

## 2024-08-27 DIAGNOSIS — Z79.4 TYPE 2 DIABETES MELLITUS WITH HYPEROSMOLARITY WITHOUT COMA, WITH LONG-TERM CURRENT USE OF INSULIN: ICD-10-CM

## 2024-08-27 PROBLEM — E66.813 CLASS 3 SEVERE OBESITY DUE TO EXCESS CALORIES IN ADULT: Status: ACTIVE | Noted: 2024-08-27

## 2024-08-27 PROCEDURE — 97802 MEDICAL NUTRITION INDIV IN: CPT | Mod: S$GLB,,, | Performed by: DIETITIAN, REGISTERED

## 2024-08-27 PROCEDURE — 4010F ACE/ARB THERAPY RXD/TAKEN: CPT | Mod: CPTII,S$GLB,, | Performed by: NURSE PRACTITIONER

## 2024-08-27 PROCEDURE — 99205 OFFICE O/P NEW HI 60 MIN: CPT | Mod: S$GLB,,, | Performed by: NURSE PRACTITIONER

## 2024-08-27 PROCEDURE — 99999 PR PBB SHADOW E&M-EST. PATIENT-LVL IV: CPT | Mod: PBBFAC,,, | Performed by: NURSE PRACTITIONER

## 2024-08-27 PROCEDURE — 99999 PR PBB SHADOW E&M-EST. PATIENT-LVL I: CPT | Mod: PBBFAC,,, | Performed by: DIETITIAN, REGISTERED

## 2024-08-27 PROCEDURE — 1160F RVW MEDS BY RX/DR IN RCRD: CPT | Mod: CPTII,S$GLB,, | Performed by: NURSE PRACTITIONER

## 2024-08-27 PROCEDURE — 3008F BODY MASS INDEX DOCD: CPT | Mod: CPTII,S$GLB,, | Performed by: NURSE PRACTITIONER

## 2024-08-27 PROCEDURE — 1159F MED LIST DOCD IN RCRD: CPT | Mod: CPTII,S$GLB,, | Performed by: NURSE PRACTITIONER

## 2024-08-27 PROCEDURE — 3044F HG A1C LEVEL LT 7.0%: CPT | Mod: CPTII,S$GLB,, | Performed by: NURSE PRACTITIONER

## 2024-08-27 NOTE — PROGRESS NOTES
BARIATRIC NEW PATIENT EVALUATION    CHIEF COMPLAINT:   Morbid obesity, body mass index is 46.29 kg/m². and inability to lose weight.    HPI:  Melissa Carvalho is a 38 y.o. morbidly obese female. Her current body mass index is 46.29 kg/m². She has multiple associated comorbidities including diabetes mellitus type 2 insulin dependent  with out complications, essential hypertension, hyperlipidemia, and CIPRIANO on CPAP.  She has struggled with excess weight since high school.  Her highest adult weight was 248 lbs at age 37, and her lowest adult weight was 214 lbs at age 20.  The patient has tried calorie counting and Exercise.  The patient was most successful with exercise with a weight loss of 5 lbs.  Her current exercise includes walking 5 times a week. She denies any history of eating disorder such as anorexia, bulimia, or taking laxatives for weight loss, and denies any addiction including illicit substances, alcohol, or gambling.  Patient states she has a good  support system.  She lives with family.  She is currently employed   .  She  denies a history of GERD.  The patient's goal is 160 lbs    ESS: Score of 1, reviewed 08/27/2024.  Does not need Sleep Study.    Pre op weight-236  IBW-129    EGD 10/24/23- Normal     EKG 6/2024 Sinus bradycardia     CXR 6/5/24 FINDINGS:   Cardiomediastinal silhouette is within normal limits.   The lungs are symmetrically inflated. No focal consolidation, effusion or pneumothorax evident.   The bones are grossly intact and unremarkable for age.     ECHO 7/2/24    Left Ventricle: The left ventricle is mildly to moderately dilated. Mildly to moderately increased wall thickness. Unable to assess wall motion. There is moderately reduced systolic function with a visually estimated ejection fraction of 30 - 35%.  Technically difficult estimation of LVEF.  Echocontrast could help for more definitive evaluation.  There is indeterminate diastolic function.    Mitral Valve: There is trace to mild  regurgitation.    Right Ventricle: Normal right ventricular cavity size. Systolic function is normal.    Pericardium: There is a trivial effusion.    No evidence of intracardiac shunt on bubble study.    Psych clearance 10/17/23 Current psychiatric risk level for bariatric surgery: low  Moderators to risk: Assessment of patient optimism that change can occur, Motivation and readiness for change, Setting and pursuing goals and Attempting to realize one's potential    Cardiac Clearance 6/25/24 YAIRTeto Avendanobrandy     PAST MEDICAL HISTORY:  Past Medical History:   Diagnosis Date    Diabetes mellitus     Heart murmur     Hypertension        PAST SURGICAL HISTORY:  Past Surgical History:   Procedure Laterality Date    CARDIAC SURGERY      at age 7       FAMILY HISTORY:  Family History   Problem Relation Name Age of Onset    Cancer Father      Diabetes Mother      Hypertension Mother      Breast cancer Neg Hx      Colon cancer Neg Hx      Ovarian cancer Neg Hx          SOCIAL HISTORY:  Social History     Socioeconomic History    Marital status: Single   Tobacco Use    Smoking status: Never    Smokeless tobacco: Never   Substance and Sexual Activity    Alcohol use: Not Currently     Comment: Occasionally    Drug use: No    Sexual activity: Yes     Partners: Male     Birth control/protection: None     Social Determinants of Health     Financial Resource Strain: Medium Risk (8/16/2024)    Overall Financial Resource Strain (CARDIA)     Difficulty of Paying Living Expenses: Somewhat hard   Food Insecurity: No Food Insecurity (8/16/2024)    Hunger Vital Sign     Worried About Running Out of Food in the Last Year: Never true     Ran Out of Food in the Last Year: Never true   Transportation Needs: Unmet Transportation Needs (5/24/2024)    Received [facility]    PRAPARE - Transportation     Lack of Transportation (Medical): Yes     Lack of Transportation (Non-Medical): Yes   Physical Activity: Insufficiently Active (8/16/2024)     Exercise Vital Sign     Days of Exercise per Week: 3 days     Minutes of Exercise per Session: 40 min   Stress: No Stress Concern Present (8/16/2024)    [employer] - Occupational Stress Questionnaire     Feeling of Stress : Not at all   Housing Stability: High Risk (8/16/2024)    Housing Stability Vital Sign     Unable to Pay for Housing in the Last Year: Yes       MEDICATIONS:    Current Outpatient Medications:     albuterol (PROVENTIL/VENTOLIN HFA) 90 mcg/actuation inhaler, Inhale 1-2 puffs into the lungs every 6 (six) hours as needed for Wheezing. Rescue, Disp: 8 g, Rfl: 0    amLODIPine (NORVASC) 5 MG tablet, Take 1 tablet (5 mg total) by mouth once daily., Disp: 30 tablet, Rfl: 0    atorvastatin (LIPITOR) 40 MG tablet, Take 40 mg by mouth once daily., Disp: , Rfl:     blood sugar diagnostic (TRUE METRIX GLUCOSE TEST STRIP) Strp, Use strip to test blood sugar once daily, Disp: 100 each, Rfl: 5    blood sugar diagnostic Strp, Check blood sugar every day, Disp: 100 each, Rfl: 5    carvediloL (COREG) 25 MG tablet, Take 25 mg by mouth 2 (two) times daily with meals., Disp: , Rfl:     carvediloL (COREG) 25 MG tablet, Take one tablet by mouth twice daily with food, Disp: 180 tablet, Rfl: 2    clotrimazole (LOTRIMIN) 1 % Soln, Apply between the toes two times a day., Disp: 30 mL, Rfl: 2    ergocalciferol (VITAMIN D2) 50,000 unit Cap, Take one capsule by mouth every week, Disp: 4 capsule, Rfl: 3    ferrous sulfate 324 mg (65 mg iron) TbEC, Take 324 mg by mouth once daily., Disp: , Rfl:     ibuprofen (ADVIL,MOTRIN) 600 MG tablet, Take 1 tablet (600 mg total) by mouth every 6 (six) hours as needed for Pain., Disp: 20 tablet, Rfl: 0    loratadine (CLARITIN) 10 mg tablet, 1 tablet, Disp: , Rfl:     losartan (COZAAR) 100 MG tablet, Take 100 mg by mouth once daily., Disp: , Rfl:     ondansetron (ZOFRAN ODT) 4 MG TbDL, Take 1 tablet (4 mg total) by mouth every 8 (eight) hours as needed (Nausea and  "Vomiting)., Disp: 12 tablet, Rfl: 0    sacubitriL-valsartan (ENTRESTO)  mg per tablet, Take one tablet by mouth twice daily. Stop taking amlodipine, Disp: 180 tablet, Rfl: 2    sacubitriL-valsartan (ENTRESTO)  mg per tablet, Take one tablet by mouth twice daily, Disp: 180 tablet, Rfl: 1    sars-cov-2, covid-19, (MODERNA COVID-19) 50 mcg/0.25 ml injection (BOOSTER), Inject into the muscle., Disp: 0.25 mL, Rfl: 0    semaglutide (OZEMPIC) 0.25 mg or 0.5 mg (2 mg/3 mL) pen injector, Inject 0.5 mg under the skin once weekly, Disp: 3 mL, Rfl: 2    ALLERGIES:  Review of patient's allergies indicates:  No Known Allergies    Review of Systems   Constitutional:  Negative for chills and fever.   HENT:  Negative for ear pain, nosebleeds and sore throat.    Eyes:  Negative for blurred vision and double vision.   Respiratory:  Negative for cough and shortness of breath.    Cardiovascular:  Negative for chest pain, palpitations, orthopnea, claudication and leg swelling.   Gastrointestinal:  Negative for abdominal pain, constipation, diarrhea, heartburn, nausea and vomiting.   Genitourinary:  Negative for dysuria and urgency.   Musculoskeletal:  Negative for back pain and joint pain.   Skin:  Negative for rash.   Neurological:  Negative for dizziness, tingling, focal weakness and headaches.   Endo/Heme/Allergies:  Does not bruise/bleed easily.   Psychiatric/Behavioral:  Negative for depression and suicidal ideas.        Vitals:    08/27/24 0833   Height: 5' 1" (1.549 m)       Physical Exam  Vitals and nursing note reviewed.   Constitutional:       Appearance: She is well-developed. She is morbidly obese.   HENT:      Head: Normocephalic.      Nose: Nose normal.      Mouth/Throat:      Mouth: Mucous membranes are moist.   Eyes:      Extraocular Movements: Extraocular movements intact.   Cardiovascular:      Rate and Rhythm: Normal rate and regular rhythm.      Heart sounds: Normal heart sounds.   Pulmonary:      Effort: " Pulmonary effort is normal.      Breath sounds: Normal breath sounds.   Abdominal:      General: Bowel sounds are normal.      Palpations: Abdomen is soft.   Musculoskeletal:         General: Normal range of motion.      Cervical back: Normal range of motion.   Skin:     General: Skin is warm and dry.      Capillary Refill: Capillary refill takes less than 2 seconds.   Neurological:      Mental Status: She is alert and oriented to person, place, and time.   Psychiatric:         Mood and Affect: Mood normal.          DIAGNOSIS:  1. Morbid obesity, body mass index is 46.29 kg/m². and inability to lose weight.  2. Co-morbidities: diabetes mellitus type 2 insulin dependent  with out complications, essential hypertension, hyperlipidemia, and CIPRIANO on CPAP    PLAN:  The patient is a good candidate for Bariatric Surgery. The patient is interested in laparoscopic selwyn-en-y gastric bypass with Dr. Hector. The surgery and post-op care was discussed in detail with the patient. All questions were answered.    The patient understands that bariatric surgery is a tool to aid in weight loss and that they need to be committed to the diet and exercise post-operatively for successful weight loss. Discussed with patient that bariatric surgery is not the easy way out and that it will take plenty of dedication on the patient's part to be successful. Also discussed the possibility of weight regain if the patient strays from the diet guidelines or exercise requirements. Patient verbalized understanding and wishes to proceed with the work-up.    Estimated Goal weight is 180-185 lbs.    Discussed hyper fertility and no NSAIDS post op      ORDERS:  1. Bariatric Dietician Consult  2. Bariatric Labs: Per orders.    PCP: [facility] -   RTC: As scheduled.    This includes 60 mins face to face time and non-face to face time preparing to see the patient (eg, review of tests), obtaining and/or reviewing separately obtained history,  documenting clinical information in the electronic or other health record, independently interpreting results and communicating results to the patient/family/caregiver, or care coordinator.

## 2024-08-27 NOTE — PATIENT INSTRUCTIONS
Prior to surgery you will need to complete:  - Dietitian consult and follow up appointments as needed  - Labs      In preparation for bariatric surgery, please complete the following:   Discuss your current medications with your primary care provider, remember your medications will need to be crushed, chewable, or in liquid form for the first 2-4 weeks after a gastric bypass or sleeve.  For a gastric band, your medications will need to be crushed indefinitely.    If you take a blood thinner such as: Coumadin (warfarin), Pradaxa (dabigatran), or Plavix (clopidogrel), you will need to speak with your prescribing provider on how or if this medication can be stopped before surgery.   If you take a medication for depression or anxiety, remember to discuss this with the psychologist or psychiatrist that you see.   If you take medication for arthritis on a daily basis that is considered a non-steroidal anti-inflammatory (NSAID), please discuss with your prescribing physician an alternative medication.  After having gastric bypass or gastric sleeve, this group of medications is not appropriate to take due to increased risk of bleeding stomach ulcers.      DEFINITIONS  Appointments: Pre-scheduled meetings or consultations with any physician, advanced practice provider, dietitian, or psychologist, and labs, imaging studies, sleep studies, etc.   Late cancellation: Cancelling an appointment 24-48 hours prior to scheduled time.  No-Show appointment:  is when   You do NOT arrive to your appointment at the time its scheduled.  You call to cancel or cancel via [employer] less than 24 hours in advance of your scheduled appointment  You show up 15 minutes AFTER your scheduled appointment time without any notification of being late.     POLICY  You are allowed up to 3 cancellations for appointments.   After 3 cancellations your case will be placed on hold for 2 months and after that time you can resume the program.   You are allowed  only 1 no-show for an appointment.   You will be re-scheduled one time and if there is a 2nd no-show at any point, your case will be placed on hold for 3 months.  After 3 months you can resume the program.     Upon resuming the program after being placed on hold for either above mentioned reasons, if you have a late cancel or no show for any appointment, the bariatric team will review if youre an appropriate candidate for surgery at the monthly meeting.

## 2024-08-27 NOTE — PROGRESS NOTES
NUTRITIONAL CONSULT    Referring Physician: Laurie Hector M.D.   Reason for MNT Referral: Initial assessment for Rios-en-Y gastric bypass work-up    PAST MEDICAL HISTORY:   38 y.o. female  Weight history includes She has struggled with excess weight since high school.  Her highest adult weight was 248 lbs at age 37, and her lowest adult weight was 214 lbs at age 20.  The patient has tried calorie counting and Exercise.  The patient was most successful with exercise with a weight loss of 5 lbs. Biggest struggle is getting off work between 6-7pm and too tired to cook and picking up food. Currently on Ozempic for DM. Started last year but did not always have access to it d/t availability. Has lost about 10 lbs so far.    Past Medical History:   Diagnosis Date    Diabetes mellitus     Heart murmur     Hypertension      CLINICAL DATA:  38 y.o.-year-old Black or  female.  Height: 5 ft. 1 in.  Weight: 236 lbs  IBW: 129 lbs  BMI: 44.6  The patient's goal weight (50-70% EBW): 161-183 lbs  Personal goal weight: 160 lbs    Goal for Bariatric Surgery: to improve health, to improve quality of life, to lose weight, and to prevent future medical conditions    DAILY NUTRITIONAL NEEDS: pre-op nutritional bariatric guidelines to promote weight loss  4523-4158 Calories    Grams Protein    NUTRITION & HEALTH HISTORY:  Greatest challenge: eating out/delivery frequency, starchy CHO, portion control, and high-fat diet    Current diet recall:     B: skips mostly OR occ small Gladiator smoothie with banana and strawberries + almonds/pb OR snacking on unsalted peanuts  L: [employer] (fast food burger and fries, soda OR fried chicken, fries, biscuit, soda) OR dinner leftovers  Sn: chips  Sn: sf snowball or ice cream  D 8pm: Chinese food OR Cane[employer]    Current Diet:  Meal pattern: 2-3 meals + snacks  Protein supplements: Gladiator smoothie from [employer] + fruit + pb/almonds + greek yogurt  Snacking: nuts, chips, sf snoball/ice  cream  Vegetables: Likes a variety. Eats rarely.  Fruits: Likes a variety. Eats 2-3 times per week.  Beverages: water, soda, diet soda, sugary beverages, sugar-free beverages/powders/drops, sweet tea, diet/unsweetened tea, and Energy drinks sugar free  Dining out/delivery: 1-2 times Daily. Mostly fast food, restaurants, and take-out.  Cooking at home: Weekly. Monthly. Mostly baked/roast, smothered, air-fried, pan-fried/sauteed, and steamed beef, fish/seafood, chicken/turkey, starchy CHO, vegetables, and beans. 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    Food Allergies:   None known  Tolerates Fairlife milk or almond milk better than regular milk.    Social:  Works regular daytime shifts. [employer] employee. .  Lives with her fiance and step son.  Grocery shopping and food prep done by the pt.  Patient believes the household will be supportive after surgery.  Alcohol: None since Feb. Plans to have occ red wine for Saints game, bdays, holidays.  Smoking: None.    ASSESSMENT:  Patient reports attempts at weight loss, only to regain lost weight.  Patient demonstrated knowledge of healthy eating behaviors and exercise patterns; admits to not eating healthy and not exercising at this point.  Patient states willingness to change lifestyle and make behavior modifications.    Barriers to Education: none    Stage of change: determination    NUTRITION DIAGNOSIS:    Morbid Obesity related to Excessive carbohydrate intake, Excessive calorie intake, and Physical inactivity as evidence by  BMI.    BARIATRIC DIET DISCUSSION/PLAN:  Discussed diet after surgery and related to patient's food record.  Reviewed nutrition guidelines for before and after surgery.  Answered all questions.  Continue to review Bariatric Nutrition Guidebook at home and call with any questions.  Work on Bariatric Nutrition Checklist.  Work on expanding variety of vegetables.  Work on gradually cutting back on starchy CHO in the diet.  Begin trying various protein supplements to determine preference  Start including protein supplements in the diet plan daily.  1200-calorie diet.  1500-calorie diet.  5-6 meals per day  Reduce frequency of eating out/delivery.  More grocery shopping and meal preparation at home.  Increase exercise  Work on cutting out sugar-sweetened beverages    RECOMMENDATIONS:  Pt is a potential candidate for bariatric surgery and needs additional medically supervised diet counseling and surveillance  Follow up in one month.     Patient verbalized understanding.    Communicated nutrition plan with bariatric team.    SESSION TIME:  60 minutes

## 2024-08-29 ENCOUNTER — OFFICE VISIT (OUTPATIENT)
Dept: PRIMARY CARE CLINIC | Facility: CLINIC | Age: 39
End: 2024-08-29
Payer: COMMERCIAL

## 2024-08-29 VITALS
WEIGHT: 239.63 LBS | BODY MASS INDEX: 45.28 KG/M2 | HEART RATE: 74 BPM | OXYGEN SATURATION: 100 % | SYSTOLIC BLOOD PRESSURE: 144 MMHG | DIASTOLIC BLOOD PRESSURE: 84 MMHG

## 2024-08-29 DIAGNOSIS — Z12.4 SCREENING FOR CERVICAL CANCER: ICD-10-CM

## 2024-08-29 DIAGNOSIS — G47.33 OSA ON CPAP: ICD-10-CM

## 2024-08-29 DIAGNOSIS — E78.5 HYPERLIPIDEMIA, UNSPECIFIED HYPERLIPIDEMIA TYPE: ICD-10-CM

## 2024-08-29 DIAGNOSIS — E66.01 CLASS 3 SEVERE OBESITY DUE TO EXCESS CALORIES WITHOUT SERIOUS COMORBIDITY WITH BODY MASS INDEX (BMI) OF 40.0 TO 44.9 IN ADULT: ICD-10-CM

## 2024-08-29 DIAGNOSIS — J30.1 SEASONAL ALLERGIC RHINITIS DUE TO POLLEN: ICD-10-CM

## 2024-08-29 DIAGNOSIS — Z79.4 TYPE 2 DIABETES MELLITUS WITHOUT COMPLICATION, WITH LONG-TERM CURRENT USE OF INSULIN: ICD-10-CM

## 2024-08-29 DIAGNOSIS — Z13.29 SCREENING FOR THYROID DISORDER: ICD-10-CM

## 2024-08-29 DIAGNOSIS — I10 PRIMARY HYPERTENSION: Primary | ICD-10-CM

## 2024-08-29 DIAGNOSIS — E11.9 TYPE 2 DIABETES MELLITUS WITHOUT COMPLICATION, WITH LONG-TERM CURRENT USE OF INSULIN: ICD-10-CM

## 2024-08-29 LAB
ALBUMIN SERPL BCP-MCNC: 3.7 G/DL (ref 3.5–5.2)
ALP SERPL-CCNC: 57 U/L (ref 55–135)
ALT SERPL W/O P-5'-P-CCNC: 18 U/L (ref 10–44)
ANION GAP SERPL CALC-SCNC: 6 MMOL/L (ref 8–16)
AST SERPL-CCNC: 15 U/L (ref 10–40)
BASOPHILS # BLD AUTO: 0.03 K/UL (ref 0–0.2)
BASOPHILS NFR BLD: 0.6 % (ref 0–1.9)
BILIRUB SERPL-MCNC: 0.2 MG/DL (ref 0.1–1)
BUN SERPL-MCNC: 11 MG/DL (ref 6–20)
CALCIUM SERPL-MCNC: 9.8 MG/DL (ref 8.7–10.5)
CHLORIDE SERPL-SCNC: 108 MMOL/L (ref 95–110)
CHOLEST SERPL-MCNC: 194 MG/DL (ref 120–199)
CHOLEST/HDLC SERPL: 6.1 {RATIO} (ref 2–5)
CO2 SERPL-SCNC: 27 MMOL/L (ref 23–29)
CREAT SERPL-MCNC: 0.8 MG/DL (ref 0.5–1.4)
DIFFERENTIAL METHOD BLD: ABNORMAL
EOSINOPHIL # BLD AUTO: 0.1 K/UL (ref 0–0.5)
EOSINOPHIL NFR BLD: 1.3 % (ref 0–8)
ERYTHROCYTE [DISTWIDTH] IN BLOOD BY AUTOMATED COUNT: 15.9 % (ref 11.5–14.5)
EST. GFR  (NO RACE VARIABLE): >60 ML/MIN/1.73 M^2
ESTIMATED AVG GLUCOSE: 123 MG/DL (ref 68–131)
GLUCOSE SERPL-MCNC: 117 MG/DL (ref 70–110)
HBA1C MFR BLD: 5.9 % (ref 4–5.6)
HCT VFR BLD AUTO: 39.6 % (ref 37–48.5)
HDLC SERPL-MCNC: 32 MG/DL (ref 40–75)
HDLC SERPL: 16.5 % (ref 20–50)
HGB BLD-MCNC: 12 G/DL (ref 12–16)
IMM GRANULOCYTES # BLD AUTO: 0.01 K/UL (ref 0–0.04)
IMM GRANULOCYTES NFR BLD AUTO: 0.2 % (ref 0–0.5)
LDLC SERPL CALC-MCNC: 129.6 MG/DL (ref 63–159)
LYMPHOCYTES # BLD AUTO: 1.8 K/UL (ref 1–4.8)
LYMPHOCYTES NFR BLD: 37.8 % (ref 18–48)
MCH RBC QN AUTO: 26.2 PG (ref 27–31)
MCHC RBC AUTO-ENTMCNC: 30.3 G/DL (ref 32–36)
MCV RBC AUTO: 87 FL (ref 82–98)
MONOCYTES # BLD AUTO: 0.4 K/UL (ref 0.3–1)
MONOCYTES NFR BLD: 9.2 % (ref 4–15)
NEUTROPHILS # BLD AUTO: 2.4 K/UL (ref 1.8–7.7)
NEUTROPHILS NFR BLD: 50.9 % (ref 38–73)
NONHDLC SERPL-MCNC: 162 MG/DL
NRBC BLD-RTO: 0 /100 WBC
PLATELET # BLD AUTO: 248 K/UL (ref 150–450)
PMV BLD AUTO: 11.5 FL (ref 9.2–12.9)
POTASSIUM SERPL-SCNC: 4.4 MMOL/L (ref 3.5–5.1)
PROT SERPL-MCNC: 7.5 G/DL (ref 6–8.4)
RBC # BLD AUTO: 4.58 M/UL (ref 4–5.4)
SODIUM SERPL-SCNC: 141 MMOL/L (ref 136–145)
TRIGL SERPL-MCNC: 162 MG/DL (ref 30–150)
TSH SERPL DL<=0.005 MIU/L-ACNC: 3.62 UIU/ML (ref 0.4–4)
WBC # BLD AUTO: 4.68 K/UL (ref 3.9–12.7)

## 2024-08-29 PROCEDURE — 83036 HEMOGLOBIN GLYCOSYLATED A1C: CPT

## 2024-08-29 PROCEDURE — 80053 COMPREHEN METABOLIC PANEL: CPT

## 2024-08-29 PROCEDURE — 84443 ASSAY THYROID STIM HORMONE: CPT

## 2024-08-29 PROCEDURE — 99999 PR PBB SHADOW E&M-EST. PATIENT-LVL IV: CPT | Mod: PBBFAC,,,

## 2024-08-29 PROCEDURE — 85025 COMPLETE CBC W/AUTO DIFF WBC: CPT

## 2024-08-29 PROCEDURE — 80061 LIPID PANEL: CPT

## 2024-08-29 RX ORDER — SEMAGLUTIDE 0.68 MG/ML
INJECTION, SOLUTION SUBCUTANEOUS
Qty: 3 ML | Refills: 2 | Status: SHIPPED | OUTPATIENT
Start: 2024-08-29

## 2024-08-29 RX ORDER — CETIRIZINE HYDROCHLORIDE 10 MG/1
10 TABLET ORAL DAILY
Qty: 90 TABLET | Refills: 3 | Status: SHIPPED | OUTPATIENT
Start: 2024-08-29 | End: 2025-08-29

## 2024-08-29 NOTE — PATIENT INSTRUCTIONS
Ananda Torres,     If you are due for any health screening(s) below please notify me so we can arrange them to be ordered and scheduled. Most healthy patients at your age complete them, but you are free to accept or refuse.     If you can't do it, I'll definitely understand. If you can, I'd certainly appreciate it!    Tests to Keep You Healthy    Cervical Cancer Screening: DUE  Last Blood Pressure <= 139/89 (8/29/2024): NO    Your cervical cancer screening is due     Our records indicate that you may be overdue for your screening Pap smear. A Pap smear is an important health screening that can detect abnormal cells that can become cervical cancer. Cervical cancer screenings allow for early diagnosis and increase the likelihood of successful treatment.     The current recommendation for Pap smear screening is every 3-5 years for women at average risk. We encourage you to schedule your appointment with your [facility] provider. Many women see a gynecologist for this screening, but some primary care providers also provide Pap screening.     If you recently had your Pap smear screening performed outside of [facility], please let your health care team know so that they can update your health record.      Lets manage your high blood pressure     Your blood pressure was above 140/90 today during your visit. We recommend that you schedule a nurse visit in two weeks to check your blood pressure and discuss ways to support your health goals.     You can also manage your health and record your blood pressure from the comfort of home by keeping a daily blood pressure log. These results are shared with and reviewed by your provider. Please print this form (Daily Blood Pressure Log) to assist you in keeping track of your blood pressure at home.     Schedule your nurse visit in two weeks to learn more about how to track and manage high blood pressure.    Daily Blood Pressure  Log    Name:__________________________________                  Date of Birth:_________    Average Blood Pressure:  __________      Date: Time  (a.m.) Blood  Pressure: Pulse  Rate: Time  (p.m.) Blood  Pressure : Pulse  Rate:   Sample 8:37 127/83 84

## 2024-08-29 NOTE — PROGRESS NOTES
INTERNAL MEDICINE  [facility]    Reason for visit:   Chief Complaint   Patient presents with    Diabetes     HPI: Melissa Carvalho is a 38 y.o. female presenting today for diabetes management.    Patient is an established patient of PCP, Belinda Mares NP of [place]. She is an established patient of [facility]. This patient is new to me. She intends to establish care with Dr. Ina Worthy DO as she transitions all of her healthcare needs to the [facility].    Patient with DM , HTN, HLD, CIPRIANO w/ CPAP, Chronic Systolic Heart Failure, Congential Cardiac Septal Defect (followed by Cardiology) and Obesity presents for DM management. She is without any complaints today and is requesting medication refills.     DM- Controlled, last A1c 6.0 in 6/5/2024. Glucose 153. She is taking Metformin 500 mg bid with meals and Ozempic 0.5 mg weekly. Denies polyuria, polydipsia, hypoglycemia, blurry vision, weakness/fatigue, dry mouth, sob, confusion, abdominal pain and n/v.     HTN/HF- /92 and repeat reading 144/84. She reports recent home BP readings have been mildly elevated. She is taking Carvedilol 25 mg BID and Entresto  mg. Followed by Cardiology.    CIPRIANO - Reports adherence with CPAP. Getting good rest and no complaints.    Obesity- BMI 45.28, followed by Bariatric surgery and planning for gastric sleeve in February 2025.      PCP: FERNANDEZ Marr  Cardiologist: Bhumika Raymond MD  Bariatrics: Kiki Villareal NP    Review of Systems   Constitutional:  Negative for chills, fever and malaise/fatigue.   HENT:  Positive for congestion. Negative for sinus pain and sore throat.    Respiratory:  Negative for cough, sputum production and shortness of breath.    Cardiovascular:  Negative for chest pain, palpitations and leg swelling.   Gastrointestinal:  Negative for abdominal pain, constipation, diarrhea, heartburn, nausea and vomiting.   Genitourinary:  Negative for dysuria, frequency  and urgency.   Musculoskeletal:  Negative for myalgias.   Neurological:  Negative for dizziness, weakness and headaches.   Endo/Heme/Allergies:  Positive for environmental allergies.   Psychiatric/Behavioral:  The patient does not have insomnia.        Social History     Social History Narrative    Not on file       ALLERGIES:   Review of patient's allergies indicates:  No Known Allergies    MEDS:   Current Outpatient Medications on File Prior to Visit   Medication Sig Dispense Refill Last Dose    atorvastatin (LIPITOR) 40 MG tablet Take 40 mg by mouth once daily.       blood sugar diagnostic (TRUE METRIX GLUCOSE TEST STRIP) Strp Use strip to test blood sugar once daily 100 each 5     blood sugar diagnostic Strp Check blood sugar every day 100 each 5     carvediloL (COREG) 25 MG tablet Take one tablet by mouth twice daily with food 180 tablet 2     clotrimazole (LOTRIMIN) 1 % Soln Apply between the toes two times a day. 30 mL 2     ergocalciferol (VITAMIN D2) 50,000 unit Cap Take one capsule by mouth every week 4 capsule 3     ibuprofen (ADVIL,MOTRIN) 600 MG tablet Take 1 tablet (600 mg total) by mouth every 6 (six) hours as needed for Pain. 20 tablet 0     sacubitriL-valsartan (ENTRESTO)  mg per tablet Take one tablet by mouth twice daily. Stop taking amlodipine 180 tablet 2     sars-cov-2, covid-19, (MODERNA COVID-19) 50 mcg/0.25 ml injection (BOOSTER) Inject into the muscle. 0.25 mL 0     [DISCONTINUED] semaglutide (OZEMPIC) 0.25 mg or 0.5 mg (2 mg/3 mL) pen injector Inject 0.5 mg under the skin once weekly 3 mL 2        Vital signs:   Vitals:    08/29/24 0938 08/29/24 1002   BP: (!) 136/92 (!) 144/84   Pulse: 74    SpO2: 100%    Weight: 108.7 kg (239 lb 10.2 oz)      Body mass index is 45.28 kg/m².    PHYSICAL EXAM:     Physical Exam  Vitals and nursing note reviewed.   Constitutional:       Appearance: She is obese. She is not ill-appearing or diaphoretic.   HENT:      Head: Normocephalic and atraumatic.       Nose: Congestion present.   Eyes:      Extraocular Movements: Extraocular movements intact.      Pupils: Pupils are equal, round, and reactive to light.   Cardiovascular:      Rate and Rhythm: Normal rate.      Heart sounds: Normal heart sounds.   Pulmonary:      Effort: Pulmonary effort is normal. No respiratory distress.      Breath sounds: Normal breath sounds. No wheezing or rhonchi.   Musculoskeletal:      Cervical back: Normal range of motion.   Skin:     General: Skin is warm and dry.      Coloration: Skin is not pale.      Findings: No erythema or rash.   Neurological:      Mental Status: She is alert and oriented to person, place, and time.      Gait: Gait normal.   Psychiatric:         Mood and Affect: Mood normal.         Behavior: Behavior normal.         Thought Content: Thought content normal.         Judgment: Judgment normal.         PERTINENT RESULTS:   No visits with results within 1 Week(s) from this visit.   Latest known visit with results is:   Hospital Outpatient Visit on 07/02/2024   Component Date Value Ref Range Status    BSA 07/02/2024 2.19  m2 Final    LVOT stroke volume 07/02/2024 64.21  cm3 Final    LVIDd 07/02/2024 5.7  3.5 - 6.0 cm Final    LV Systolic Volume 07/02/2024 76.70  mL Final    LV Systolic Volume Index 07/02/2024 37.2  mL/m2 Final    LVIDs 07/02/2024 4.16 (A)  2.1 - 4.0 cm Final    LV Diastolic Volume 07/02/2024 177.07  mL Final    LV Diastolic Volume Index 07/02/2024 85.96  mL/m2 Final    Left Ventricular End Systolic Volu* 07/02/2024 76.70  mL Final    Left Ventricular End Diastolic Vol* 07/02/2024 177.07  mL Final    IVS 07/02/2024 1.3 (A)  0.6 - 1.1 cm Final    LVOT diameter 07/02/2024 2.12  cm Final    LVOT area 07/02/2024 3.5  cm2 Final    FS 07/02/2024 27 (A)  28 - 44 % Final    Left Ventricle Relative Wall Thick* 07/02/2024 0.46  cm Final    Posterior Wall 07/02/2024 1.3 (A)  0.6 - 1.1 cm Final    LV mass 07/02/2024 322.25  g Final    LV Mass Index 07/02/2024  156  g/m2 Final    MV Peak E Nikhil 07/02/2024 0.99  m/s Final    TDI LATERAL 07/02/2024 0.07  m/s Final    TDI SEPTAL 07/02/2024 0.07  m/s Final    E/E' ratio 07/02/2024 14.14  m/s Final    MV Peak A Nikhil 07/02/2024 0.61  m/s Final    TR Max Nikhil 07/02/2024 2.34  m/s Final    E/A ratio 07/02/2024 1.62   Final    E wave deceleration time 07/02/2024 153.76  msec Final    LV SEPTAL E/E' RATIO 07/02/2024 14.14  m/s Final    LV LATERAL E/E' RATIO 07/02/2024 14.14  m/s Final    PV Peak S Nikhil 07/02/2024 0.49  m/s Final    PV Peak D Nikhil 07/02/2024 0.42  m/s Final    Pulm vein S/D ratio 07/02/2024 1.17   Final    LVOT peak nikhil 07/02/2024 0.78  m/s Final    Left Ventricular Outflow Tract Hailey* 07/02/2024 0.54  cm/s Final    Left Ventricular Outflow Tract Hailey* 07/02/2024 1.32  mmHg Final    RV/LV Ratio 07/02/2024 0.55  cm Final    LA size 07/02/2024 3.46  cm Final    Left Atrium Minor Axis 07/02/2024 5.16  cm Final    Left Atrium Major Axis 07/02/2024 4.80  cm Final    RA Major Axis 07/02/2024 4.17  cm Final    AV mean gradient 07/02/2024 3  mmHg Final    AV peak gradient 07/02/2024 5  mmHg Final    Ao peak nikhil 07/02/2024 1.12  m/s Final    Ao VTI 07/02/2024 21.90  cm Final    LVOT peak VTI 07/02/2024 18.20  cm Final    AV valve area 07/02/2024 2.93  cm² Final    AV Velocity Ratio 07/02/2024 0.70   Final    AV index (prosthetic) 07/02/2024 0.83   Final    BELINDA by Velocity Ratio 07/02/2024 2.46  cm² Final    MV mean gradient 07/02/2024 2  mmHg Final    MV peak gradient 07/02/2024 6  mmHg Final    MV stenosis pressure 1/2 time 07/02/2024 43.54  ms Final    MV valve area p 1/2 method 07/02/2024 5.05  cm2 Final    MV valve area by continuity eq 07/02/2024 2.18  cm2 Final    MV VTI 07/02/2024 29.4  cm Final    Triscuspid Valve Regurgitation Pea* 07/02/2024 22  mmHg Final    PV PEAK VELOCITY 07/02/2024 0.74  m/s Final    PV peak gradient 07/02/2024 2  mmHg Final    Pulmonary Valve Mean Velocity 07/02/2024 0.53  m/s Final    Ao root  annulus 07/02/2024 2.66  cm Final    STJ 07/02/2024 2.35  cm Final    Ascending aorta 07/02/2024 2.23  cm Final    IVC diameter 07/02/2024 1.22  cm Final    Mean e' 07/02/2024 0.07  m/s Final    ZLVIDS 07/02/2024 0.70   Final    ZLVIDD 07/02/2024 -0.87   Final    RVDD 07/02/2024 3.16  cm Final    AORTIC VALVE CUSP SEPERATION 07/02/2024 1.59  cm Final    LA Volume Index 07/02/2024 24.9  mL/m2 Final    LA volume 07/02/2024 51.19  cm3 Final    LA volume (mod) 07/02/2024 47.00  cm3 Final    LA WIDTH 07/02/2024 3.5  cm Final    LA Volume Index (Mod) 07/02/2024 22.8  mL/m2 Final    RA Width 07/02/2024 2.9  cm Final    TV resting pulmonary artery pressu* 07/02/2024 22  mmHg Final    RV TB RVSP 07/02/2024 2  mmHg Final    Est. RA pres 07/02/2024 0  mmHg Final       ASSESSMENT/PLAN:    1. Primary hypertension  -     CBC Auto Differential; Future; Expected date: 08/29/2024  -     Comprehensive Metabolic Panel; Future; Expected date: 08/29/2024    2. Class 3 severe obesity due to excess calories without serious comorbidity with body mass index (BMI) of 40.0 to 44.9 in adult  -     CBC Auto Differential; Future; Expected date: 08/29/2024  -     Comprehensive Metabolic Panel; Future; Expected date: 08/29/2024    3. Type 2 diabetes mellitus without complication, with long-term current use of insulin  -     Hemoglobin A1C; Future; Expected date: 08/29/2024  -     semaglutide (OZEMPIC) 0.25 mg or 0.5 mg (2 mg/3 mL) pen injector; Inject 0.5 mg under the skin once weekly  Dispense: 3 mL; Refill: 2    4. CIPRIANO on CPAP  Assessment & Plan:  -Reports good adherence and restful sleep      5. Screening for thyroid disorder  -     TSH; Future; Expected date: 08/29/2024    6. Hyperlipidemia, unspecified hyperlipidemia type  -     Lipid Panel; Future; Expected date: 08/29/2024    7. Seasonal allergic rhinitis due to pollen  -     cetirizine (ZYRTEC) 10 MG tablet; Take 1 tablet (10 mg total) by mouth once daily.  Dispense: 90 tablet; Refill:  3    8. Screening for cervical cancer  -     Ambulatory referral/consult to Gynecology; Future; Expected date: 09/05/2024      Health maintenance addressed: Due for cervical cancer screening  Vaccines recommended: Tetanus, Covid-19 booster    Follow up in about 2 weeks (around 9/12/2024) for hypertension / BP log follow-up.     JEANNETTE Saul   Internal Medicine

## 2024-09-03 ENCOUNTER — PATIENT MESSAGE (OUTPATIENT)
Dept: BARIATRICS | Facility: CLINIC | Age: 39
End: 2024-09-03
Payer: COMMERCIAL

## 2024-09-10 ENCOUNTER — PATIENT MESSAGE (OUTPATIENT)
Dept: BARIATRICS | Facility: CLINIC | Age: 39
End: 2024-09-10
Payer: COMMERCIAL

## 2024-09-26 ENCOUNTER — OFFICE VISIT (OUTPATIENT)
Dept: PRIMARY CARE CLINIC | Facility: CLINIC | Age: 39
End: 2024-09-26
Payer: COMMERCIAL

## 2024-09-26 VITALS
WEIGHT: 241.5 LBS | SYSTOLIC BLOOD PRESSURE: 146 MMHG | OXYGEN SATURATION: 99 % | DIASTOLIC BLOOD PRESSURE: 96 MMHG | HEART RATE: 84 BPM | BODY MASS INDEX: 45.63 KG/M2

## 2024-09-26 DIAGNOSIS — Z23 NEED FOR VACCINATION: ICD-10-CM

## 2024-09-26 DIAGNOSIS — I10 PRIMARY HYPERTENSION: ICD-10-CM

## 2024-09-26 DIAGNOSIS — Z79.4 TYPE 2 DIABETES MELLITUS WITHOUT COMPLICATION, WITH LONG-TERM CURRENT USE OF INSULIN: Primary | ICD-10-CM

## 2024-09-26 DIAGNOSIS — E11.9 TYPE 2 DIABETES MELLITUS WITHOUT COMPLICATION, WITH LONG-TERM CURRENT USE OF INSULIN: Primary | ICD-10-CM

## 2024-09-26 PROCEDURE — 99999 PR PBB SHADOW E&M-EST. PATIENT-LVL IV: CPT | Mod: PBBFAC,,,

## 2024-09-26 RX ORDER — CHLORTHALIDONE 25 MG/1
25 TABLET ORAL DAILY
Qty: 30 TABLET | Refills: 11 | Status: SHIPPED | OUTPATIENT
Start: 2024-09-26 | End: 2025-09-26

## 2024-09-26 NOTE — PATIENT INSTRUCTIONS
Ananda Torres,     If you are due for any health screening(s) below please notify me so we can arrange them to be ordered and scheduled. Most healthy patients at your age complete them, but you are free to accept or refuse.     If you can't do it, I'll definitely understand. If you can, I'd certainly appreciate it!    Tests to Keep You Healthy    Eye Exam: ORDERED BUT NOT SCHEDULED  Cervical Cancer Screening: DUE  Last Blood Pressure <= 139/89 (9/26/2024): NO  Last HbA1c < 8 (08/29/2024): Yes    Your cervical cancer screening is due     Our records indicate that you may be overdue for your screening Pap smear. A Pap smear is an important health screening that can detect abnormal cells that can become cervical cancer. Cervical cancer screenings allow for early diagnosis and increase the likelihood of successful treatment.     The current recommendation for Pap smear screening is every 3-5 years for women at average risk. We encourage you to schedule your appointment with your [facility] provider. Many women see a gynecologist for this screening, but some primary care providers also provide Pap screening.     If you recently had your Pap smear screening performed outside of [facility], please let your health care team know so that they can update your health record.      Lets manage your high blood pressure     Your blood pressure was above 140/90 today during your visit. We recommend that you schedule a nurse visit in two weeks to check your blood pressure and discuss ways to support your health goals.     You can also manage your health and record your blood pressure from the comfort of home by keeping a daily blood pressure log. These results are shared with and reviewed by your provider. Please print this form (Daily Blood Pressure Log) to assist you in keeping track of your blood pressure at home.     Schedule your nurse visit in two weeks to learn more about how to track and manage high blood  pressure.    Daily Blood Pressure Log    Name:__________________________________                  Date of Birth:_________    Average Blood Pressure:  __________      Date: Time  (a.m.) Blood  Pressure: Pulse  Rate: Time  (p.m.) Blood  Pressure : Pulse  Rate:   Sample 8:37 127/83 84                                                                                                                                                                                   Your diabetic retinal eye exam is due     Diabetes is the #1 cause of blindness in the [place] - early detection before signs or symptoms develop can prevent debilitating blindness.     Our records indicate that you may be overdue for your annual diabetic eye exam. Eye screening can help identify patients at risk for developing vision loss which is common in diabetes. This simple screening is an important step to keeping you healthy and preventing complications from diabetes.     This recommended diabetic eye exam should take place once per year and can prevent and treat diabetes complications in the eye before developing symptoms. This can be done with a special camera is used to take photographs of the back of your eye without having to dilate them, or you can see an eye doctor for a full dilated exam.     If you recently had your yearly diabetic eye exam performed outside of [facility], please let your Health care team know so that they can update your health record.

## 2024-09-26 NOTE — PROGRESS NOTES
"INTERNAL MEDICINE  [facility]  [place]    Reason for visit:   Chief Complaint   Patient presents with    Hypertension       HPI: Melissa Carvalho is a 38 y.o. female presenting today for hypertension.    Patient is an established patient of [facility]. Establishing care with Dr. Ina Worthy DO in upcoming months. This patient is known to me.    History of Present Illness    CHIEF COMPLAINT:  Melissa presents today for follow up.    HYPERTENSION:  She reports recent high blood pressure readings at home, with yesterday's reading being 146/97. She acknowledges experiencing vision changes, specifically difficulty driving due to vision strain. She denies headaches, chest pain, palpitations, or shortness of breath associated with elevated blood pressure. She notes some ankle swelling.    WEIGHT MANAGEMENT AND OZEMPIC TREATMENT:  She is currently on Ozempic 0.5 mg for weight management. Initially, it was effective in suppressing appetite and reducing portion sizes, leading to weight loss. However, she reports decreased effectiveness over time, with return of cravings and increased food intake. She is currently experiencing a weight plateau at 235 lbs and expresses desire to increase the Ozempic dose to address these issues and resume weight loss progress.    DIET AND CRAVINGS:  She reports increased cravings for seafood and savory items. She acknowledges consuming shrimp recently, which may have contributed to elevated blood pressure. She notes a shift in dietary habits, including increased consumption of starches such as potatoes, corn, and broccoli, which she had previously avoided. She expresses concern about returning to larger portion sizes and eating more frequently. She reports late night snacking, including cravings for chocolate cake at 10 PM, which is unusual for her. She describes her cravings as being "all over the place" and compares the intensity to pregnancy cravings. She has been trying to manage " cravings by eating small apples as a healthier alternative.    FOOT AND ANKLE ISSUES:  She reports left foot pain in a specific area and ankle swelling. She denies any recent foot or ankle injuries. She mentions having seen her podiatrist last month for a routine exam.    VISION CONCERNS:  She reports experiencing vision concerns, particularly while driving. She is straining her eyes when driving, which is impacting her ability to operate a vehicle safely. She acknowledges the need for an eye exam and expresses interest in obtaining a proper prescription.    PREVENTIVE CARE:  She has an upcoming Pap smear scheduled. She is open to receiving a flu vaccine during the current visit.    CURRENT MEDICATIONS:  She reports current medications include Entresto and Carvedilol (Coreg).    ROS:  General: -fever, -chills, -fatigue, -weight gain, -weight loss, -change in weight  Eyes: +vision changes, -redness, -discharge  ENT: -ear pain, -nasal congestion, -sore throat  Cardiovascular: -chest pain, -palpitations, -lower extremity edema  Respiratory: -cough, -shortness of breath  Gastrointestinal: -abdominal pain, -nausea, -vomiting, -diarrhea, -constipation, -blood in stool  Genitourinary: -dysuria, -hematuria, -frequency  Musculoskeletal: -joint pain, -muscle pain  Skin: -rash, -lesion  Neurological: -headache, -dizziness, -numbness, -tingling, -weakness  Psychiatric: -anxiety, -depression, -sleep difficulty         Social History     Social History Narrative    Not on file       ALLERGIES:   Review of patient's allergies indicates:  No Known Allergies    MEDS:   Current Outpatient Medications on File Prior to Visit   Medication Sig Dispense Refill Last Dose    atorvastatin (LIPITOR) 40 MG tablet Take 40 mg by mouth once daily.       blood sugar diagnostic (TRUE METRIX GLUCOSE TEST STRIP) Strp Use strip to test blood sugar once daily 100 each 5     blood sugar diagnostic Strp Check blood sugar every day 100 each 5      carvediloL (COREG) 25 MG tablet Take one tablet by mouth twice daily with food 180 tablet 2     cetirizine (ZYRTEC) 10 MG tablet Take 1 tablet (10 mg total) by mouth once daily. 90 tablet 3     clotrimazole (LOTRIMIN) 1 % Soln Apply between the toes two times a day. 30 mL 2     ergocalciferol (VITAMIN D2) 50,000 unit Cap Take one capsule by mouth every week 4 capsule 3     ibuprofen (ADVIL,MOTRIN) 600 MG tablet Take 1 tablet (600 mg total) by mouth every 6 (six) hours as needed for Pain. 20 tablet 0     metFORMIN (GLUCOPHAGE-XR) 500 MG ER 24hr tablet Take 1 tablet by mouth twice daily with breakfast and evening meal 180 tablet 1     sacubitriL-valsartan (ENTRESTO) 49-51 mg per tablet Take one tablet by mouth twice daily 180 tablet 1     sacubitriL-valsartan (ENTRESTO)  mg per tablet Take one tablet by mouth twice daily. Stop taking amlodipine 180 tablet 2     sars-cov-2, covid-19, (MODERNA COVID-19) 50 mcg/0.25 ml injection (BOOSTER) Inject into the muscle. 0.25 mL 0     [DISCONTINUED] semaglutide (OZEMPIC) 0.25 mg or 0.5 mg (2 mg/3 mL) pen injector Inject 0.5 mg under the skin once weekly 3 mL 2        Vital signs:   Vitals:    09/26/24 0859 09/26/24 0926   BP: (!) 155/108 (!) 146/96   Pulse: 84    SpO2: 99%    Weight: 109.5 kg (241 lb 8.2 oz)      Body mass index is 45.63 kg/m².    PHYSICAL EXAM:     Physical Exam      PERTINENT RESULTS:   No visits with results within 1 Week(s) from this visit.   Latest known visit with results is:   Office Visit on 08/29/2024   Component Date Value Ref Range Status    TSH 08/29/2024 3.618  0.400 - 4.000 uIU/mL Final    Cholesterol 08/29/2024 194  120 - 199 mg/dL Final    Comment: The National Cholesterol Education Program (NCEP) has set the  following guidelines (reference ranges) for Cholesterol:  Optimal.....................<200 mg/dL  Borderline High.............200-239 mg/dL  High........................> or = 240 mg/dL      Triglycerides 08/29/2024 162 (H)  30 - 150  mg/dL Final    Comment: The National Cholesterol Education Program (NCEP) has set the  following guidelines (reference values) for triglycerides:  Normal......................<150 mg/dL  Borderline High.............150-199 mg/dL  High........................200-499 mg/dL      HDL 08/29/2024 32 (L)  40 - 75 mg/dL Final    Comment: The National Cholesterol Education Program (NCEP) has set the  following guidelines (reference values) for HDL Cholesterol:  Low...............<40 mg/dL  Optimal...........>60 mg/dL      LDL Cholesterol 08/29/2024 129.6  63.0 - 159.0 mg/dL Final    Comment: The National Cholesterol Education Program (NCEP) has set the  following guidelines (reference values) for LDL Cholesterol:  Optimal.......................<130 mg/dL  Borderline High...............130-159 mg/dL  High..........................160-189 mg/dL  Very High.....................>190 mg/dL      HDL/Cholesterol Ratio 08/29/2024 16.5 (L)  20.0 - 50.0 % Final    Total Cholesterol/HDL Ratio 08/29/2024 6.1 (H)  2.0 - 5.0 Final    Non-HDL Cholesterol 08/29/2024 162  mg/dL Final    Comment: Risk category and Non-HDL cholesterol goals:  Coronary heart disease (CHD)or equivalent (10-year risk of CHD >20%):  Non-HDL cholesterol goal     <130 mg/dL  Two or more CHD risk factors and 10-year risk of CHD <= 20%:  Non-HDL cholesterol goal     <160 mg/dL  0 to 1 CHD risk factor:  Non-HDL cholesterol goal     <190 mg/dL      Hemoglobin A1C 08/29/2024 5.9 (H)  4.0 - 5.6 % Final    Comment: ADA Screening Guidelines:  5.7-6.4%  Consistent with prediabetes  >or=6.5%  Consistent with diabetes    High levels of fetal hemoglobin interfere with the HbA1C  assay. Heterozygous hemoglobin variants (HbS, HgC, etc)do  not significantly interfere with this assay.   However, presence of multiple variants may affect accuracy.      Estimated Avg Glucose 08/29/2024 123  68 - 131 mg/dL Final    Sodium 08/29/2024 141  136 - 145 mmol/L Final    Potassium 08/29/2024  4.4  3.5 - 5.1 mmol/L Final    Chloride 08/29/2024 108  95 - 110 mmol/L Final    CO2 08/29/2024 27  23 - 29 mmol/L Final    Glucose 08/29/2024 117 (H)  70 - 110 mg/dL Final    BUN 08/29/2024 11  6 - 20 mg/dL Final    Creatinine 08/29/2024 0.8  0.5 - 1.4 mg/dL Final    Calcium 08/29/2024 9.8  8.7 - 10.5 mg/dL Final    Total Protein 08/29/2024 7.5  6.0 - 8.4 g/dL Final    Albumin 08/29/2024 3.7  3.5 - 5.2 g/dL Final    Total Bilirubin 08/29/2024 0.2  0.1 - 1.0 mg/dL Final    Comment: For infants and newborns, interpretation of results should be based  on gestational age, weight and in agreement with clinical  observations.    Premature Infant recommended reference ranges:  Up to 24 hours.............<8.0 mg/dL  Up to 48 hours............<12.0 mg/dL  3-5 days..................<15.0 mg/dL  6-29 days.................<15.0 mg/dL      Alkaline Phosphatase 08/29/2024 57  55 - 135 U/L Final    AST 08/29/2024 15  10 - 40 U/L Final    ALT 08/29/2024 18  10 - 44 U/L Final    eGFR 08/29/2024 >60.0  >60 mL/min/1.73 m^2 Final    Anion Gap 08/29/2024 6 (L)  8 - 16 mmol/L Final    WBC 08/29/2024 4.68  3.90 - 12.70 K/uL Final    RBC 08/29/2024 4.58  4.00 - 5.40 M/uL Final    Hemoglobin 08/29/2024 12.0  12.0 - 16.0 g/dL Final    Hematocrit 08/29/2024 39.6  37.0 - 48.5 % Final    MCV 08/29/2024 87  82 - 98 fL Final    MCH 08/29/2024 26.2 (L)  27.0 - 31.0 pg Final    MCHC 08/29/2024 30.3 (L)  32.0 - 36.0 g/dL Final    RDW 08/29/2024 15.9 (H)  11.5 - 14.5 % Final    Platelets 08/29/2024 248  150 - 450 K/uL Final    MPV 08/29/2024 11.5  9.2 - 12.9 fL Final    Immature Granulocytes 08/29/2024 0.2  0.0 - 0.5 % Final    Gran # (ANC) 08/29/2024 2.4  1.8 - 7.7 K/uL Final    Immature Grans (Abs) 08/29/2024 0.01  0.00 - 0.04 K/uL Final    Comment: Mild elevation in immature granulocytes is non specific and   can be seen in a variety of conditions including stress response,   acute inflammation, trauma and pregnancy. Correlation with other    laboratory and clinical findings is essential.      Lymph # 08/29/2024 1.8  1.0 - 4.8 K/uL Final    Mono # 08/29/2024 0.4  0.3 - 1.0 K/uL Final    Eos # 08/29/2024 0.1  0.0 - 0.5 K/uL Final    Baso # 08/29/2024 0.03  0.00 - 0.20 K/uL Final    nRBC 08/29/2024 0  0 /100 WBC Final    Gran % 08/29/2024 50.9  38.0 - 73.0 % Final    Lymph % 08/29/2024 37.8  18.0 - 48.0 % Final    Mono % 08/29/2024 9.2  4.0 - 15.0 % Final    Eosinophil % 08/29/2024 1.3  0.0 - 8.0 % Final    Basophil % 08/29/2024 0.6  0.0 - 1.9 % Final    Differential Method 08/29/2024 Automated   Final     Assessment & Plan    Assessed patient's blood pressure, which was elevated at 146/96 despite current medications  Determined need for additional antihypertensive medication due to persistently elevated blood pressure, especially diastolic readings over 100  Chose to add chlorthalidone, a diuretic, to current regimen of Entresto and Coreg  Noted possible fluid retention contributing to ankle swelling, which may be addressed by new diuretic  Considered increasing Ozempic dose from 0.5mg to address weight loss plateau and returning food cravings  Recognized need for diabetic eye exam due to patient's reported vision changes affecting driving    HYPERTENSION:  - Explained chlorthalidone works as a diuretic to remove excess fluid from the body.  - Discussed potential side effects of chlorthalidone, primarily increased urination.  - Advised on importance of monitoring kidney function and electrolytes when starting a diuretic medication.  - Started chlorthalidone 1 tablet daily in the morning.  - Continued Entresto at current dose.  - Continued Coreg at current dose.  - BMP ordered in 1 month to check kidney function and electrolytes.    DIABETES:  - Melissa to continue checking blood sugar daily while on Ozempic and metformin due to increased risk of low blood sugar.  - Increased Ozempic injection dose from 0.5mg (current dose) to next higher dose.  -  Continued metformin at current dose.  - Urine test ordered for diabetes screening (to be scheduled, can be done with blood draw).  - Referred to ophthalmologist for diabetic eye exam.    WEIGHT MANAGEMENT:  - Melissa to find alternatives to satisfy cravings, such as eating apples instead of elevated-calorie snacks.    FLU VACCINATION:  - Flu shot administered today.    FOLLOW UP:  - Follow up in 2 weeks to assess response to increased Ozempic dose and new blood pressure medication.  - Follow up in 1 month for blood draw and urine test.       ASSESSMENT/PLAN:    1. Type 2 diabetes mellitus without complication, with long-term current use of insulin  -     semaglutide (OZEMPIC) 1 mg/dose (4 mg/3 mL); Inject 1 mg into the skin every 7 days.  Dispense: 3 mL; Refill: 11  -     Ambulatory referral/consult to Ophthalmology; Future; Expected date: 10/03/2024  -     Microalbumin/creatinine urine ratio    2. Primary hypertension  -     chlorthalidone (HYGROTEN) 25 MG Tab; Take 1 tablet (25 mg total) by mouth once daily.  Dispense: 30 tablet; Refill: 11  -     Basic Metabolic Panel; Future; Expected date: 10/26/2024    3. Need for vaccination  -     Influenza - Trivalent - PF (ADULT)    Health maintenance addressed: Upcoming eye exam, diabetic urine test, and pap  Vaccines recommended: Influenza administered today, covid-19, tetanus    Follow up in about 2 weeks (around 10/10/2024).     JEANNETTE Saul   Internal Medicine

## 2024-09-28 ENCOUNTER — TELEPHONE (OUTPATIENT)
Dept: BARIATRICS | Facility: CLINIC | Age: 39
End: 2024-09-28
Payer: COMMERCIAL

## 2024-10-01 ENCOUNTER — PATIENT MESSAGE (OUTPATIENT)
Dept: BARIATRICS | Facility: CLINIC | Age: 39
End: 2024-10-01

## 2024-10-01 ENCOUNTER — CLINICAL SUPPORT (OUTPATIENT)
Dept: BARIATRICS | Facility: CLINIC | Age: 39
End: 2024-10-01
Payer: COMMERCIAL

## 2024-10-01 DIAGNOSIS — I10 PRIMARY HYPERTENSION: ICD-10-CM

## 2024-10-01 DIAGNOSIS — E66.01 MORBID OBESITY WITH BMI OF 45.0-49.9, ADULT: ICD-10-CM

## 2024-10-01 DIAGNOSIS — Z71.3 DIETARY COUNSELING: Primary | ICD-10-CM

## 2024-10-01 DIAGNOSIS — E11.9 TYPE 2 DIABETES MELLITUS WITHOUT COMPLICATION, WITH LONG-TERM CURRENT USE OF INSULIN: ICD-10-CM

## 2024-10-01 DIAGNOSIS — Z79.4 TYPE 2 DIABETES MELLITUS WITHOUT COMPLICATION, WITH LONG-TERM CURRENT USE OF INSULIN: ICD-10-CM

## 2024-10-01 DIAGNOSIS — E78.2 MIXED HYPERLIPIDEMIA: ICD-10-CM

## 2024-10-01 PROCEDURE — 97803 MED NUTRITION INDIV SUBSEQ: CPT | Mod: 95,,, | Performed by: DIETITIAN, REGISTERED

## 2024-10-01 NOTE — PROGRESS NOTES
The patient location is: home ([place])  The chief complaint leading to consultation is: morbid obesity    Visit type: audiovisual    Face to Face time with patient: 30 min  30 minutes of total time spent on the encounter, which includes face to face time and non-face to face time preparing to see the patient (eg, review of tests), Obtaining and/or reviewing separately obtained history, Documenting clinical information in the electronic or other health record, Independently interpreting results (not separately reported) and communicating results to the patient/family/caregiver, or Care coordination (not separately reported).         Each patient to whom he or she provides medical services by telemedicine is:  (1) informed of the relationship between the physician and patient and the respective role of any other health care provider with respect to management of the patient; and (2) notified that he or she may decline to receive medical services by telemedicine and may withdraw from such care at any time.    NUTRITIONAL Note     Referring Physician: Laurie Hector M.D.   Reason for MNT Referral: Follow up assessment for Rios-en-Y gastric bypass work-up     38 y.o. female presents with 5 lbs weight gain. States Ozempic wore off and had her craving serjio cake, cookies, etc. Eating larger, heavier meals later in the evenings d/t work schedule.           Past Medical History:   Diagnosis Date    Diabetes mellitus      Heart murmur      Hypertension        CLINICAL DATA:  38 y.o.-year-old Black or  female.  Height: 5 ft. 1 in.  Weight: 241 lbs  IBW: 129 lbs  BMI: 45.6     DAILY NUTRITIONAL NEEDS: pre-op nutritional bariatric guidelines to promote weight loss  [phone] Calories    Grams Protein     NUTRITION & HEALTH HISTORY:  Greatest challenge: eating out/delivery frequency, starchy CHO, portion control, and high-fat diet     Current diet recall:      B 11am: 1 poached egg, 2 pancakes with sf syrup,  machado  D 8pm: Chinese take out (shrimp and broccoli, fried rice, egg roll)  Sn: 1-2 scoops ice cream     Current Diet:  Meal pattern: 2-3 meals + snacks  Protein supplements: none lately. Likes Gladiator smoothie from [employer] + fruit + pb/almonds + greek yogurt.  Snacking: cakes, cookies, nuts, chips, sf snoball/ice cream  Vegetables: Likes a variety. Eats rarely.  Fruits: Likes a variety. Eats 2-3 times per week.  Beverages: water, diet soda, sugar-free beverages/powders/drops, diet/unsweetened tea, and Energy drinks sugar free  Dining out/delivery: 1-2 times Daily. Mostly fast food, restaurants, and take-out.  Cooking at home: Weekly. Monthly. Mostly baked/roast, smothered, air-fried, pan-fried/sauteed, and steamed beef, fish/seafood, chicken/turkey, starchy CHO, vegetables, and beans. Red bean/White beans and rice OR spaghetti and meat sauce OR baked fish/chicken with broccoli/cheese OR smothered turkey necks or steak and brown rice with gravy     Exercise:  None      Restrictions to exercise: none     Vitamins / Minerals / Herbs:   Vit D Rx weekly  MV for women  Hair skin and nails     Labs:   Reviewed.     Food Allergies:   None known  Tolerates Fairlife milk or almond milk better than regular milk.     Social:  Works regular daytime shifts. [employer] employee. .  Lives with her fiance and step son.  Grocery shopping and food prep done by the pt.  Patient believes the household will be supportive after surgery.  Alcohol: None since Feb. Plans to have occ red wine for [employer] game, bdays, holidays.  Smoking: None.     ASSESSMENT:  Patient demonstrated knowledge of healthy eating behaviors and exercise patterns; admits to not eating healthy and not exercising at this point.  Patient states willingness to change lifestyle and make behavior modifications.     Barriers to Education: none     Stage of change: contemplation/determination     NUTRITION DIAGNOSIS:    Morbid Obesity related to Excessive  carbohydrate intake, Excessive calorie intake, and Physical inactivity as evidence by BMI.     BARIATRIC DIET DISCUSSION/PLAN:  Discussed diet after surgery and related to patient's food record.  Reviewed nutrition guidelines for before and after surgery.  Answered all questions.  Continue to review Bariatric Nutrition Guidebook at home and call with any questions.  Work on Bariatric Nutrition Checklist.  Work on expanding variety of vegetables.  Work on gradually cutting back on starchy CHO in the diet.  Begin trying various protein supplements to determine preference  Start including protein supplements in the diet plan daily.  1200-calorie diet.  1500-calorie diet.  5-6 meals per day  Reduce frequency of eating out/delivery.  More grocery shopping and meal preparation at home.  Increase exercise. Plans to use gym at work after work (treadmill, weights), goal of 3 days/week.  Work on cutting out sugar-sweetened beverages  Weekly meal prep on weekends and Tuesdays. (Lean protein, vegetables mostly, low carb).     RECOMMENDATIONS:  Pt is a potential candidate for bariatric surgery and needs additional medically supervised diet counseling and surveillance  Follow up in one month.      Patient verbalized understanding.     Communicated nutrition plan with bariatric team.     SESSION TIME:  30 minutes

## 2024-10-08 ENCOUNTER — PATIENT MESSAGE (OUTPATIENT)
Dept: BARIATRICS | Facility: CLINIC | Age: 39
End: 2024-10-08
Payer: COMMERCIAL

## 2024-10-10 ENCOUNTER — OFFICE VISIT (OUTPATIENT)
Dept: PRIMARY CARE CLINIC | Facility: CLINIC | Age: 39
End: 2024-10-10
Payer: COMMERCIAL

## 2024-10-10 VITALS
DIASTOLIC BLOOD PRESSURE: 84 MMHG | OXYGEN SATURATION: 99 % | HEART RATE: 94 BPM | SYSTOLIC BLOOD PRESSURE: 133 MMHG | BODY MASS INDEX: 44.51 KG/M2 | WEIGHT: 235.56 LBS

## 2024-10-10 DIAGNOSIS — I10 PRIMARY HYPERTENSION: ICD-10-CM

## 2024-10-10 DIAGNOSIS — Z79.4 TYPE 2 DIABETES MELLITUS WITHOUT COMPLICATION, WITH LONG-TERM CURRENT USE OF INSULIN: Primary | ICD-10-CM

## 2024-10-10 DIAGNOSIS — G47.33 OSA ON CPAP: ICD-10-CM

## 2024-10-10 DIAGNOSIS — E66.01 CLASS 3 SEVERE OBESITY DUE TO EXCESS CALORIES WITHOUT SERIOUS COMORBIDITY WITH BODY MASS INDEX (BMI) OF 40.0 TO 44.9 IN ADULT: ICD-10-CM

## 2024-10-10 DIAGNOSIS — E11.9 TYPE 2 DIABETES MELLITUS WITHOUT COMPLICATION, WITH LONG-TERM CURRENT USE OF INSULIN: Primary | ICD-10-CM

## 2024-10-10 DIAGNOSIS — E78.2 MIXED HYPERLIPIDEMIA: ICD-10-CM

## 2024-10-10 DIAGNOSIS — E66.813 CLASS 3 SEVERE OBESITY DUE TO EXCESS CALORIES WITHOUT SERIOUS COMORBIDITY WITH BODY MASS INDEX (BMI) OF 40.0 TO 44.9 IN ADULT: ICD-10-CM

## 2024-10-10 DIAGNOSIS — Z23 NEED FOR VACCINE FOR TD (TETANUS-DIPHTHERIA): ICD-10-CM

## 2024-10-10 LAB
ANION GAP SERPL CALC-SCNC: 9 MMOL/L (ref 8–16)
BUN SERPL-MCNC: 19 MG/DL (ref 6–20)
CALCIUM SERPL-MCNC: 9.6 MG/DL (ref 8.7–10.5)
CHLORIDE SERPL-SCNC: 102 MMOL/L (ref 95–110)
CO2 SERPL-SCNC: 27 MMOL/L (ref 23–29)
CREAT SERPL-MCNC: 1.1 MG/DL (ref 0.5–1.4)
EST. GFR  (NO RACE VARIABLE): >60 ML/MIN/1.73 M^2
GLUCOSE SERPL-MCNC: 106 MG/DL (ref 70–110)
POTASSIUM SERPL-SCNC: 4 MMOL/L (ref 3.5–5.1)
SODIUM SERPL-SCNC: 138 MMOL/L (ref 136–145)

## 2024-10-10 PROCEDURE — 80048 BASIC METABOLIC PNL TOTAL CA: CPT

## 2024-10-10 PROCEDURE — 99999 PR PBB SHADOW E&M-EST. PATIENT-LVL IV: CPT | Mod: PBBFAC,,,

## 2024-10-10 NOTE — ASSESSMENT & PLAN NOTE
-Increased ozempic to 1 mg 9/26/24  -Reports good effect and no undesired SE  Lab Results   Component Value Date    HGBA1C 5.9 (H) 08/29/2024   -Will redraw A1C prior to next appointment

## 2024-10-10 NOTE — PROGRESS NOTES
"INTERNAL MEDICINE  [facility]    Reason for visit:   Chief Complaint   Patient presents with    Hypertension       HPI: Melissa Carvalho is a 38 y.o. female presenting today for diabetes and hypertension follow-up.    Patient is an established patient of [facility] scheduled to establish care with PCP, Dr. Ina Worthy DO. This patient is known to me.    History of Present Illness    CHIEF COMPLAINT:  Melissa presents today for follow up.    HYPERTENSION:  She reports improved blood pressure readings both at home and during office visits. She began taking chlorthalidone 25 mg daily two weeks ago due to consistently elevated BP. She demonstrates understanding of hypertension as a potential "silent killer" and the importance of blood pressure control. She is pleased with the improvement in readings and reports feeling better all around.    MEDICATIONS:  She continues Entresto, Coreg, and a statin as prescribed, reporting feeling good on this regimen. She reports positive effects from her new chlorthalidone, noting reduced fluid retention around her heart and ankles, improved sleep quality, and increased urination, which she finds tolerable. She expresses gratitude for the medication despite initial hesitation. Ozempic dose was recently increased to 1 mg, which she started last Thursday. She notes significant appetite suppression and weight loss since the increase. She acknowledges previous effectiveness of Ozempic at 0.5 mg for diabetes management, but observed increased appetite over time, prompting the dose adjustment.    WEIGHT MANAGEMENT:  She reports a 6-pound weight loss over the past 15 days, reduced food cravings, and smaller portion sizes. Her breakfast now consists of small portions of grits, eggs, and shrimp. She is preparing for bariatric surgery scheduled for February, which will require a two-week liquid diet prior to the procedure, avoiding red and blue-colored foods.    EXERCISE:  She " engages in exercise activities at work, including walking on the treadmill and using the elliptical machine during downtime.    SYMPTOMS:  She previously experienced fluid retention around her heart, in her ankles, and fingers, with difficulty breathing at night. These symptoms have significantly improved, particularly her ability to sleep comfortably at night. She reports good adherence to CPAP therapy and medication. She denies any side effects from the prescribed medications.    SCHEDULED APPOINTMENTS:  She is working to move all of her care over from [facility] to [facility]. She has upcoming appointments for a diabetic eye exam, OB/GYN well-woman exam, and podiatry exam. She will establish care with Dr. Worthy on 11/27 with fasting labs prior to the visit.      ROS:  General: -fever, -chills, -fatigue, -weight gain, +weight loss, +appetite changes  Eyes: -vision changes, -redness, -discharge  ENT: -ear pain, -nasal congestion, -sore throat  Cardiovascular: -chest pain, -palpitations, -lower extremity edema  Respiratory: -cough, -shortness of breath, -difficulty breathing  Gastrointestinal: -abdominal pain, -nausea, -vomiting, -diarrhea, -constipation, -blood in stool  Genitourinary: -dysuria, -hematuria, +frequency  Musculoskeletal: -joint pain, -muscle pain  Skin: -rash, -lesion  Neurological: -headache, -dizziness, -numbness, -tingling  Psychiatric: -anxiety, -depression, -sleep difficulty         Social History     Social History Narrative    Not on file       ALLERGIES:   Review of patient's allergies indicates:  No Known Allergies    MEDS:   Current Outpatient Medications on File Prior to Visit   Medication Sig Dispense Refill Last Dose/Taking    atorvastatin (LIPITOR) 40 MG tablet Take 40 mg by mouth once daily.       blood sugar diagnostic (TRUE METRIX GLUCOSE TEST STRIP) Strp Use strip to test blood sugar once daily 100 each 5     blood sugar diagnostic Strp Check blood sugar every day 100 each 5     carvediloL  (COREG) 25 MG tablet Take one tablet by mouth twice daily with food 180 tablet 2     cetirizine (ZYRTEC) 10 MG tablet Take 1 tablet (10 mg total) by mouth once daily. 90 tablet 3     chlorthalidone (HYGROTEN) 25 MG Tab Take 1 tablet (25 mg total) by mouth once daily. 30 tablet 11     clotrimazole (LOTRIMIN) 1 % Soln Apply between the toes two times a day. 30 mL 2     ergocalciferol (VITAMIN D2) 50,000 unit Cap Take one capsule by mouth every week 4 capsule 3     ibuprofen (ADVIL,MOTRIN) 600 MG tablet Take 1 tablet (600 mg total) by mouth every 6 (six) hours as needed for Pain. 20 tablet 0     metFORMIN (GLUCOPHAGE-XR) 500 MG ER 24hr tablet Take 1 tablet by mouth twice daily with breakfast and evening meal 180 tablet 1     sacubitriL-valsartan (ENTRESTO) 49-51 mg per tablet Take one tablet by mouth twice daily 180 tablet 1     sacubitriL-valsartan (ENTRESTO)  mg per tablet Take one tablet by mouth twice daily. Stop taking amlodipine 180 tablet 2     sars-cov-2, covid-19, (MODERNA COVID-19) 50 mcg/0.25 ml injection (BOOSTER) Inject into the muscle. 0.25 mL 0     semaglutide (OZEMPIC) 1 mg/dose (4 mg/3 mL) Inject 1 mg into the skin every 7 days. 3 mL 11        Vital signs:   Vitals:    10/10/24 0855   BP: 133/84   Pulse: 94   SpO2: 99%   Weight: 106.8 kg (235 lb 9 oz)     Body mass index is 44.51 kg/m².    PHYSICAL EXAM:     Physical Exam    Vitals: Home blood pressure: 122/73.  General: No acute distress. Well-developed. Well-nourished.  Eyes: EOMI. Sclerae anicteric.  HENT: Normocephalic. Atraumatic.   Cardiovascular: Regular rate.   Respiratory: Normal respiratory effort.    Abdomen: Non-tender. Non-distended.   Musculoskeletal: No obvious deformity.  Extremities: No lower extremity edema.  Neurological: Alert & oriented x3. No slurred speech. Normal gait.  Psychiatric: Normal mood. Normal affect. Good insight. Good judgment.  Skin: Warm. Dry. No rash.         PERTINENT RESULTS:   No visits with results within  1 Week(s) from this visit.   Latest known visit with results is:   Office Visit on 08/29/2024   Component Date Value Ref Range Status    TSH 08/29/2024 3.618  0.400 - 4.000 uIU/mL Final    Cholesterol 08/29/2024 194  120 - 199 mg/dL Final    Comment: The National Cholesterol Education Program (NCEP) has set the  following guidelines (reference ranges) for Cholesterol:  Optimal.....................<200 mg/dL  Borderline High.............200-239 mg/dL  High........................> or = 240 mg/dL      Triglycerides 08/29/2024 162 (H)  30 - 150 mg/dL Final    Comment: The National Cholesterol Education Program (NCEP) has set the  following guidelines (reference values) for triglycerides:  Normal......................<150 mg/dL  Borderline High.............150-199 mg/dL  High........................200-499 mg/dL      HDL 08/29/2024 32 (L)  40 - 75 mg/dL Final    Comment: The National Cholesterol Education Program (NCEP) has set the  following guidelines (reference values) for HDL Cholesterol:  Low...............<40 mg/dL  Optimal...........>60 mg/dL      LDL Cholesterol 08/29/2024 129.6  63.0 - 159.0 mg/dL Final    Comment: The National Cholesterol Education Program (NCEP) has set the  following guidelines (reference values) for LDL Cholesterol:  Optimal.......................<130 mg/dL  Borderline High...............130-159 mg/dL  High..........................160-189 mg/dL  Very High.....................>190 mg/dL      HDL/Cholesterol Ratio 08/29/2024 16.5 (L)  20.0 - 50.0 % Final    Total Cholesterol/HDL Ratio 08/29/2024 6.1 (H)  2.0 - 5.0 Final    Non-HDL Cholesterol 08/29/2024 162  mg/dL Final    Comment: Risk category and Non-HDL cholesterol goals:  Coronary heart disease (CHD)or equivalent (10-year risk of CHD >20%):  Non-HDL cholesterol goal     <130 mg/dL  Two or more CHD risk factors and 10-year risk of CHD <= 20%:  Non-HDL cholesterol goal     <160 mg/dL  0 to 1 CHD risk factor:  Non-HDL cholesterol goal      <190 mg/dL      Hemoglobin A1C 08/29/2024 5.9 (H)  4.0 - 5.6 % Final    Comment: ADA Screening Guidelines:  5.7-6.4%  Consistent with prediabetes  >or=6.5%  Consistent with diabetes    High levels of fetal hemoglobin interfere with the HbA1C  assay. Heterozygous hemoglobin variants (HbS, HgC, etc)do  not significantly interfere with this assay.   However, presence of multiple variants may affect accuracy.      Estimated Avg Glucose 08/29/2024 123  68 - 131 mg/dL Final    Sodium 08/29/2024 141  136 - 145 mmol/L Final    Potassium 08/29/2024 4.4  3.5 - 5.1 mmol/L Final    Chloride 08/29/2024 108  95 - 110 mmol/L Final    CO2 08/29/2024 27  23 - 29 mmol/L Final    Glucose 08/29/2024 117 (H)  70 - 110 mg/dL Final    BUN 08/29/2024 11  6 - 20 mg/dL Final    Creatinine 08/29/2024 0.8  0.5 - 1.4 mg/dL Final    Calcium 08/29/2024 9.8  8.7 - 10.5 mg/dL Final    Total Protein 08/29/2024 7.5  6.0 - 8.4 g/dL Final    Albumin 08/29/2024 3.7  3.5 - 5.2 g/dL Final    Total Bilirubin 08/29/2024 0.2  0.1 - 1.0 mg/dL Final    Comment: For infants and newborns, interpretation of results should be based  on gestational age, weight and in agreement with clinical  observations.    Premature Infant recommended reference ranges:  Up to 24 hours.............<8.0 mg/dL  Up to 48 hours............<12.0 mg/dL  3-5 days..................<15.0 mg/dL  6-29 days.................<15.0 mg/dL      Alkaline Phosphatase 08/29/2024 57  55 - 135 U/L Final    AST 08/29/2024 15  10 - 40 U/L Final    ALT 08/29/2024 18  10 - 44 U/L Final    eGFR 08/29/2024 >60.0  >60 mL/min/1.73 m^2 Final    Anion Gap 08/29/2024 6 (L)  8 - 16 mmol/L Final    WBC 08/29/2024 4.68  3.90 - 12.70 K/uL Final    RBC 08/29/2024 4.58  4.00 - 5.40 M/uL Final    Hemoglobin 08/29/2024 12.0  12.0 - 16.0 g/dL Final    Hematocrit 08/29/2024 39.6  37.0 - 48.5 % Final    MCV 08/29/2024 87  82 - 98 fL Final    MCH 08/29/2024 26.2 (L)  27.0 - 31.0 pg Final    MCHC 08/29/2024 30.3 (L)  32.0 -  "36.0 g/dL Final    RDW 08/29/2024 15.9 (H)  11.5 - 14.5 % Final    Platelets 08/29/2024 248  150 - 450 K/uL Final    MPV 08/29/2024 11.5  9.2 - 12.9 fL Final    Immature Granulocytes 08/29/2024 0.2  0.0 - 0.5 % Final    Gran # (ANC) 08/29/2024 2.4  1.8 - 7.7 K/uL Final    Immature Grans (Abs) 08/29/2024 0.01  0.00 - 0.04 K/uL Final    Comment: Mild elevation in immature granulocytes is non specific and   can be seen in a variety of conditions including stress response,   acute inflammation, trauma and pregnancy. Correlation with other   laboratory and clinical findings is essential.      Lymph # 08/29/2024 1.8  1.0 - 4.8 K/uL Final    Mono # 08/29/2024 0.4  0.3 - 1.0 K/uL Final    Eos # 08/29/2024 0.1  0.0 - 0.5 K/uL Final    Baso # 08/29/2024 0.03  0.00 - 0.20 K/uL Final    nRBC 08/29/2024 0  0 /100 WBC Final    Gran % 08/29/2024 50.9  38.0 - 73.0 % Final    Lymph % 08/29/2024 37.8  18.0 - 48.0 % Final    Mono % 08/29/2024 9.2  4.0 - 15.0 % Final    Eosinophil % 08/29/2024 1.3  0.0 - 8.0 % Final    Basophil % 08/29/2024 0.6  0.0 - 1.9 % Final    Differential Method 08/29/2024 Automated   Final       ASSESSMENT/PLAN:    Assessment & Plan    Assessed blood pressure, noting significant improvement with current regimen  Evaluated effectiveness of Ozempic dose increase to 1mg, noting positive impact on appetite control and weight loss  Considered potential fluid retention issues, now improved with diuretic therapy  Monitored weight loss progress, noting 6 lbs lost in 15 days  Will maintain current Ozempic dose for at least 1 month before considering further increases  Reviewed current medication regimen, including Entresto, Coreg, and statin  Assessed need for metabolic panel to monitor electrolytes and kidney function due to diuretic use  Evaluated immunization status and determined need for Tdap vaccine    HYPERTENSION:  - Discussed importance of blood pressure control as a "silent killer".  - Continued diuretic.  - " Explained potential need for potassium supplementation if lab results show low levels due to diuretic use.  - Continued Entresto.  - Continued Coreg.    TDAP VACCINATION:  - Provided information on Tdap vaccine's protective benefits against tetanus from various exposures (e.g., animal bites, cuts).  - Administered Tdap vaccine in office.    DIABETES:  - Continued Ozempic 1mg weekly, with recent increase from 0.5mg.  - Follow up in 1 month to reassess Ozempic effectiveness and overall progress.    HYPERLIPIDEMIA:  - Continued statin.    LABS:  - Ordered metabolic panel to check electrolytes and kidney function.  - Contact office if lab results show low potassium, as potassium supplement may need to be added.    PODIATRY REFERRAL:  - Referred to podiatry within [facility] system.       ASSESSMENT/PLAN:    1. Type 2 diabetes mellitus without complication, with long-term current use of insulin  Assessment & Plan:  -Increased ozempic to 1 mg 9/26/24  -Reports good effect and no undesired SE  Lab Results   Component Value Date    HGBA1C 5.9 (H) 08/29/2024   -Will redraw A1C prior to next appointment    Orders:  -     Ambulatory referral/consult to Podiatry; Future; Expected date: 10/17/2024  -     Lipid Panel; Future; Expected date: 11/18/2024  -     HEMOGLOBIN A1C; Future; Expected date: 11/18/2024  -     CBC W/ AUTO DIFFERENTIAL; Future; Expected date: 11/18/2024  -     COMPREHENSIVE METABOLIC PANEL; Future; Expected date: 11/18/2024  -     Microalbumin/creatinine urine ratio; Future; Expected date: 11/18/2024    2. Need for vaccine for Td (tetanus-diphtheria)  -     Tdap (BOOSTRIX) vaccine injection 0.5 mL    3. Primary hypertension  Assessment & Plan:  At goal on current medications  Recent addition of chlorthalidone 25 mg  Will check bmp today to evaluate electrolytes/kidney function     Orders:  -     CBC W/ AUTO DIFFERENTIAL; Future; Expected date: 11/18/2024  -     COMPREHENSIVE METABOLIC PANEL; Future; Expected  date: 11/18/2024    4. Mixed hyperlipidemia  Assessment & Plan:  On statin  Reinforced diet/exercise for cardiovascular health  Scheduled repeat fasting lipid 11/2024    Orders:  -     Lipid Panel; Future; Expected date: 11/18/2024    5. Class 3 severe obesity due to excess calories without serious comorbidity with body mass index (BMI) of 40.0 to 44.9 in adult  Assessment & Plan:  -Recently increased ozempic to 1 mg with good effect  -Reports improvements in her diet/exercise  -Planning for bariatric surgery in February 6. CIPRIANO on CPAP  Assessment & Plan:  -Continued good adherence with improvements in sleep      Health maintenance addressed: Scheduled ob/gyn pap, eye exam, and podiatry exam. Scheduled diabetes urine screening and routine maintenance labs.  Vaccines recommended: Tetanus administered today; Recommend pneumococcal, covid-19    Follow up in about 7 weeks (around 11/27/2024) for establishing care with Dr. Worthy.     Verenice Saenz, FNP-C   Internal Medicine

## 2024-10-10 NOTE — ASSESSMENT & PLAN NOTE
-Recently increased ozempic to 1 mg with good effect  -Reports improvements in her diet/exercise  -Planning for bariatric surgery in February

## 2024-10-10 NOTE — ASSESSMENT & PLAN NOTE
At goal on current medications  Recent addition of chlorthalidone 25 mg  Will check bmp today to evaluate electrolytes/kidney function

## 2024-10-10 NOTE — ASSESSMENT & PLAN NOTE
On statin  Reinforced diet/exercise for cardiovascular health  Scheduled repeat fasting lipid 11/2024

## 2024-10-15 ENCOUNTER — OFFICE VISIT (OUTPATIENT)
Dept: OBSTETRICS AND GYNECOLOGY | Facility: CLINIC | Age: 39
End: 2024-10-15
Payer: COMMERCIAL

## 2024-10-15 VITALS
HEART RATE: 89 BPM | SYSTOLIC BLOOD PRESSURE: 127 MMHG | BODY MASS INDEX: 43.77 KG/M2 | HEIGHT: 61 IN | DIASTOLIC BLOOD PRESSURE: 83 MMHG | WEIGHT: 231.81 LBS

## 2024-10-15 DIAGNOSIS — Z12.4 SCREENING FOR CERVICAL CANCER: ICD-10-CM

## 2024-10-15 DIAGNOSIS — Z01.419 WELL WOMAN EXAM WITH ROUTINE GYNECOLOGICAL EXAM: Primary | ICD-10-CM

## 2024-10-15 DIAGNOSIS — Z11.3 SCREENING FOR STDS (SEXUALLY TRANSMITTED DISEASES): ICD-10-CM

## 2024-10-15 PROCEDURE — 1160F RVW MEDS BY RX/DR IN RCRD: CPT | Mod: CPTII,S$GLB,, | Performed by: OBSTETRICS & GYNECOLOGY

## 2024-10-15 PROCEDURE — 99999 PR PBB SHADOW E&M-EST. PATIENT-LVL III: CPT | Mod: PBBFAC,,, | Performed by: OBSTETRICS & GYNECOLOGY

## 2024-10-15 PROCEDURE — 3044F HG A1C LEVEL LT 7.0%: CPT | Mod: CPTII,S$GLB,, | Performed by: OBSTETRICS & GYNECOLOGY

## 2024-10-15 PROCEDURE — 99385 PREV VISIT NEW AGE 18-39: CPT | Mod: S$GLB,,, | Performed by: OBSTETRICS & GYNECOLOGY

## 2024-10-15 PROCEDURE — 1159F MED LIST DOCD IN RCRD: CPT | Mod: CPTII,S$GLB,, | Performed by: OBSTETRICS & GYNECOLOGY

## 2024-10-15 PROCEDURE — 3008F BODY MASS INDEX DOCD: CPT | Mod: CPTII,S$GLB,, | Performed by: OBSTETRICS & GYNECOLOGY

## 2024-10-15 PROCEDURE — 4010F ACE/ARB THERAPY RXD/TAKEN: CPT | Mod: CPTII,S$GLB,, | Performed by: OBSTETRICS & GYNECOLOGY

## 2024-10-15 PROCEDURE — 3079F DIAST BP 80-89 MM HG: CPT | Mod: CPTII,S$GLB,, | Performed by: OBSTETRICS & GYNECOLOGY

## 2024-10-15 PROCEDURE — 87491 CHLMYD TRACH DNA AMP PROBE: CPT | Performed by: OBSTETRICS & GYNECOLOGY

## 2024-10-15 PROCEDURE — 87591 N.GONORRHOEAE DNA AMP PROB: CPT | Performed by: OBSTETRICS & GYNECOLOGY

## 2024-10-15 PROCEDURE — 3074F SYST BP LT 130 MM HG: CPT | Mod: CPTII,S$GLB,, | Performed by: OBSTETRICS & GYNECOLOGY

## 2024-10-15 PROCEDURE — 87624 HPV HI-RISK TYP POOLED RSLT: CPT | Performed by: OBSTETRICS & GYNECOLOGY

## 2024-10-15 RX ORDER — NORETHINDRONE 0.35 MG/1
1 TABLET ORAL DAILY
Qty: 28 TABLET | Refills: 12 | Status: SHIPPED | OUTPATIENT
Start: 2024-10-15 | End: 2025-10-15

## 2024-10-15 RX ORDER — CICLOPIROX 80 MG/ML
SOLUTION TOPICAL
COMMUNITY
Start: 2023-11-07

## 2024-10-15 RX ORDER — CETIRIZINE HYDROCHLORIDE 10 MG/1
TABLET ORAL
COMMUNITY
Start: 2022-11-22

## 2024-10-15 RX ORDER — FLUTICASONE PROPIONATE 50 MCG
SPRAY, SUSPENSION (ML) NASAL
COMMUNITY
Start: 2022-09-22

## 2024-10-15 RX ORDER — METFORMIN HYDROCHLORIDE 500 MG/1
TABLET, EXTENDED RELEASE ORAL
COMMUNITY
Start: 2022-07-29

## 2024-10-15 RX ORDER — LANCETS
EACH MISCELLANEOUS
COMMUNITY
Start: 2024-04-23

## 2024-10-15 RX ORDER — DEXTROSE 4 G
TABLET,CHEWABLE ORAL
COMMUNITY
Start: 2024-04-23

## 2024-10-15 RX ORDER — CLOTRIMAZOLE 1 %
CREAM (GRAM) TOPICAL 2 TIMES DAILY
COMMUNITY
Start: 2023-11-07

## 2024-10-15 RX ORDER — AZELASTINE 1 MG/ML
1 SPRAY, METERED NASAL 2 TIMES DAILY
COMMUNITY
Start: 2023-08-22

## 2024-10-15 RX ORDER — LOSARTAN POTASSIUM 100 MG/1
TABLET ORAL
COMMUNITY
Start: 2022-11-22

## 2024-10-15 RX ORDER — CARVEDILOL 25 MG/1
1 TABLET ORAL 2 TIMES DAILY WITH MEALS
COMMUNITY

## 2024-10-15 RX ORDER — MULTIVITAMIN
1 TABLET ORAL DAILY
COMMUNITY

## 2024-10-15 RX ORDER — AMLODIPINE BESYLATE 5 MG/1
TABLET ORAL
COMMUNITY
Start: 2023-01-31

## 2024-10-15 NOTE — PROGRESS NOTES
"Subjective     Patient ID: Melissa Carvalho is a 38 y.o. female.    Chief Complaint:  Well Woman      History of Present Illness  HPI  38 y.o.  presents for annual exam.  Overall doing well.  Cycles are irregular, has previously been dx with PCOS.  Not currently on birth control - she does want to conceive, but is having bariatric surgery soon, was told not to conceive prior to this.  H/o 2 miscarriages.  Sexually active without complaints.  PMH significant for DM and HTN.  No other complaints today.  Last pap 2018 wnl.      GYN & OB History  Patient's last menstrual period was 10/11/2024 (exact date).   Date of Last Pap: 2018    OB History    Para Term  AB Living   2 0 0   2 0   SAB IAB Ectopic Multiple Live Births   2              # Outcome Date GA Lbr Raul/2nd Weight Sex Type Anes PTL Lv   2 SAB            1 SAB                Review of Systems  Review of Systems   Constitutional:  Negative for chills and fever.   Respiratory:  Negative for shortness of breath.    Cardiovascular:  Negative for chest pain.   Gastrointestinal:  Negative for abdominal pain, constipation and diarrhea.   Genitourinary:  Positive for menstrual problem (irregular cycles). Negative for dyspareunia, pelvic pain and vaginal discharge.   Musculoskeletal:  Negative for myalgias.   Neurological:  Negative for headaches.          Objective   Physical Exam    /83   Pulse 89   Ht 5' 1" (1.549 m)   Wt 105.2 kg (231 lb 13 oz)   LMP 10/11/2024 (Exact Date)   BMI 43.80 kg/m²     Gen: NAD  Resp: Normal respiratory effort  Breast: Symmetric, nontender.  No masses.  No skin changes.  No nipple discharge.   Abd: soft, NT  Pelvic: Normal-appearing external female genitalia.  No CMT.  Uterus and adnexa difficult to palpate, but no masses or tenderness noted.   Ext: normal ROM  Psych: appropriate affect  Neuro: grossly intact         Assessment and Plan     Melissa was seen today for well woman.    Diagnoses and all " orders for this visit:    Well woman exam with routine gynecological exam    Screening for cervical cancer  -     Ambulatory referral/consult to Gynecology  -     Liquid-Based Pap Smear, Screening  -     HPV High Risk Genotypes, PCR    Screening for STDs (sexually transmitted diseases)  -     HIV 1/2 Ag/Ab (4th Gen); Future  -     Treponema Pallidium Antibodies IgG, IgM; Future  -     C. trachomatis/N. gonorrhoeae by AMP DNA [facility]; Cervix          Plan:  Routine annual exam with pap smear and breast exam today.  STD screening.  Discussed PCOS and fertility with patient.  Can start on POPs for now, while awaiting surgery.  After 6-8 weeks of healing, can repeat work up with US and labs.    Counseling done, precautions given, all questions answered.  RTC 1 year for annual, or prn.

## 2024-10-16 LAB
C TRACH DNA SPEC QL NAA+PROBE: NOT DETECTED
N GONORRHOEA DNA SPEC QL NAA+PROBE: NOT DETECTED

## 2024-10-17 LAB
HPV HR 12 DNA SPEC QL NAA+PROBE: NEGATIVE
HPV16 AG SPEC QL: NEGATIVE
HPV18 DNA SPEC QL NAA+PROBE: NEGATIVE

## 2024-10-24 ENCOUNTER — PATIENT MESSAGE (OUTPATIENT)
Dept: PODIATRY | Facility: CLINIC | Age: 39
End: 2024-10-24
Payer: COMMERCIAL

## 2024-10-28 ENCOUNTER — OFFICE VISIT (OUTPATIENT)
Dept: PODIATRY | Facility: CLINIC | Age: 39
End: 2024-10-28
Payer: COMMERCIAL

## 2024-10-28 VITALS
WEIGHT: 231.5 LBS | SYSTOLIC BLOOD PRESSURE: 156 MMHG | DIASTOLIC BLOOD PRESSURE: 102 MMHG | BODY MASS INDEX: 43.71 KG/M2 | HEART RATE: 88 BPM | HEIGHT: 61 IN

## 2024-10-28 DIAGNOSIS — E11.9 ENCOUNTER FOR DIABETIC FOOT EXAM: Primary | ICD-10-CM

## 2024-10-28 DIAGNOSIS — E11.9 TYPE 2 DIABETES MELLITUS WITHOUT COMPLICATION, WITH LONG-TERM CURRENT USE OF INSULIN: ICD-10-CM

## 2024-10-28 DIAGNOSIS — Z79.4 TYPE 2 DIABETES MELLITUS WITHOUT COMPLICATION, WITH LONG-TERM CURRENT USE OF INSULIN: ICD-10-CM

## 2024-10-28 DIAGNOSIS — L85.3 XEROSIS OF SKIN: ICD-10-CM

## 2024-10-28 PROCEDURE — 1159F MED LIST DOCD IN RCRD: CPT | Mod: CPTII,S$GLB,, | Performed by: PODIATRIST

## 2024-10-28 PROCEDURE — 3077F SYST BP >= 140 MM HG: CPT | Mod: CPTII,S$GLB,, | Performed by: PODIATRIST

## 2024-10-28 PROCEDURE — 99999 PR PBB SHADOW E&M-EST. PATIENT-LVL III: CPT | Mod: PBBFAC,,, | Performed by: PODIATRIST

## 2024-10-28 PROCEDURE — 3080F DIAST BP >= 90 MM HG: CPT | Mod: CPTII,S$GLB,, | Performed by: PODIATRIST

## 2024-10-28 PROCEDURE — 4010F ACE/ARB THERAPY RXD/TAKEN: CPT | Mod: CPTII,S$GLB,, | Performed by: PODIATRIST

## 2024-10-28 PROCEDURE — 3044F HG A1C LEVEL LT 7.0%: CPT | Mod: CPTII,S$GLB,, | Performed by: PODIATRIST

## 2024-10-28 PROCEDURE — 3008F BODY MASS INDEX DOCD: CPT | Mod: CPTII,S$GLB,, | Performed by: PODIATRIST

## 2024-10-28 PROCEDURE — 1160F RVW MEDS BY RX/DR IN RCRD: CPT | Mod: CPTII,S$GLB,, | Performed by: PODIATRIST

## 2024-10-28 PROCEDURE — 99204 OFFICE O/P NEW MOD 45 MIN: CPT | Mod: S$GLB,,, | Performed by: PODIATRIST

## 2024-10-28 RX ORDER — AMMONIUM LACTATE 12 G/100G
1 CREAM TOPICAL DAILY
Qty: 140 G | Refills: 1 | Status: SHIPPED | OUTPATIENT
Start: 2024-10-28

## 2024-11-02 ENCOUNTER — PATIENT MESSAGE (OUTPATIENT)
Dept: BARIATRICS | Facility: CLINIC | Age: 39
End: 2024-11-02
Payer: COMMERCIAL

## 2024-11-12 ENCOUNTER — CLINICAL SUPPORT (OUTPATIENT)
Dept: BARIATRICS | Facility: CLINIC | Age: 39
End: 2024-11-12
Payer: COMMERCIAL

## 2024-11-12 ENCOUNTER — PATIENT MESSAGE (OUTPATIENT)
Dept: BARIATRICS | Facility: CLINIC | Age: 39
End: 2024-11-12

## 2024-11-12 DIAGNOSIS — I10 PRIMARY HYPERTENSION: ICD-10-CM

## 2024-11-12 DIAGNOSIS — E78.2 MIXED HYPERLIPIDEMIA: ICD-10-CM

## 2024-11-12 DIAGNOSIS — Z79.4 TYPE 2 DIABETES MELLITUS WITH HYPEROSMOLARITY WITHOUT COMA, WITH LONG-TERM CURRENT USE OF INSULIN: ICD-10-CM

## 2024-11-12 DIAGNOSIS — E11.00 TYPE 2 DIABETES MELLITUS WITH HYPEROSMOLARITY WITHOUT COMA, WITH LONG-TERM CURRENT USE OF INSULIN: ICD-10-CM

## 2024-11-12 DIAGNOSIS — E66.01 MORBID OBESITY WITH BMI OF 40.0-44.9, ADULT: ICD-10-CM

## 2024-11-12 DIAGNOSIS — Z71.3 DIETARY COUNSELING: Primary | ICD-10-CM

## 2024-11-12 NOTE — PROGRESS NOTES
The patient location is: parked car ([place])  The chief complaint leading to consultation is: morbid obesity    Visit type: audiovisual    Face to Face time with patient: 15 min  15 minutes of total time spent on the encounter, which includes face to face time and non-face to face time preparing to see the patient (eg, review of tests), Obtaining and/or reviewing separately obtained history, Documenting clinical information in the electronic or other health record, Independently interpreting results (not separately reported) and communicating results to the patient/family/caregiver, or Care coordination (not separately reported).         Each patient to whom he or she provides medical services by telemedicine is:  (1) informed of the relationship between the physician and patient and the respective role of any other health care provider with respect to management of the patient; and (2) notified that he or she may decline to receive medical services by telemedicine and may withdraw from such care at any time.      NUTRITIONAL Note     Referring Physician: Laurie Hector M.D.   Reason for MNT Referral: Follow up assessment for Rios-en-Y gastric bypass work-up     39 y.o. female presents with self reported 13 lbs weight loss over the past month; total 8 lbs weight loss. States increased Ozempic has been working to decrease appetite and cravings. Has been on a mostly soft diet of fruit smoothies, soups and ice cream d/t recently getting braces.             Past Medical History:   Diagnosis Date    Diabetes mellitus      Heart murmur      Hypertension        CLINICAL DATA:  39 y.o.-year-old Black or  female.  Height: 5 ft. 1 in.  Weight: 228 lbs  IBW: 129 lbs  BMI: 43     DAILY NUTRITIONAL NEEDS: pre-op nutritional bariatric guidelines to promote weight loss  [id number] Calories    Grams Protein     NUTRITION & HEALTH HISTORY:  Greatest challenge: eating out/delivery frequency, starchy CHO, portion  control, and high-fat diet     Current diet recall: soft diet d/t recent braces     B: [employer] Gladiator with berries and nuts  D: chicken noodle soup   Sn: 1 scoop ice cream     Current Diet:  Meal pattern: 2-3 meals + 1-2 protein supplements  Protein supplements: Gladiator smoothie from [employer] + fruit + pb/almonds + greek yogurt.  Snacking: ice cream  Vegetables: Likes a variety. Eats rarely.  Fruits: Likes a variety. Eats 2-3 times per week.  Beverages: water, diet soda, sugar-free beverages/powders/drops, diet/unsweetened tea, and Energy drinks sugar free  Dining out/delivery: 1-2 times Daily. Mostly fast food, restaurants, and take-out.  Cooking at home: Weekly. Monthly. Mostly baked/roast, smothered, air-fried, pan-fried/sauteed, and steamed beef, fish/seafood, chicken/turkey, starchy CHO, vegetables, and beans. Red bean/White beans and rice OR spaghetti and meat sauce OR baked fish/chicken with broccoli/cheese OR smothered turkey necks or steak and brown rice with gravy     Exercise:  Walking on treadmill/elliptical, squats, light weights (uses PT gym at her work)     Restrictions to exercise: none     Vitamins / Minerals / Herbs:   Vit D Rx weekly  MV for women  Hair skin and nails     Labs:   Reviewed.     Food Allergies:   None known  Tolerates Fairlife milk or almond milk better than regular milk.     Social:  Works regular daytime shifts. [employer] employee. .  Lives with her fiance and step son.  Grocery shopping and food prep done by the pt.  Patient believes the household will be supportive after surgery.  Alcohol: None since Feb. Plans to have occ red wine for [employer] game, bdays, holidays.  Smoking: None.     ASSESSMENT:  Patient demonstrated knowledge of healthy eating behaviors and exercise patterns; admits to not eating healthy and not exercising at this point.  Patient states and shows some willingness to change lifestyle and make behavior modifications.     Barriers to Education: none      Stage of change: determination/action     NUTRITION DIAGNOSIS:    Morbid Obesity related to Excessive carbohydrate intake, Excessive calorie intake, and Physical inactivity as evidence by BMI.  Status: Improved     BARIATRIC DIET DISCUSSION/PLAN:  Discussed diet after surgery and related to patient's food record.  Reviewed nutrition guidelines for before and after surgery.  Answered all questions.  Continue to review Bariatric Nutrition Guidebook at home and call with any questions.  Work on Bariatric Nutrition Checklist.  Work on expanding variety of vegetables.  Continue cutting back on starchy CHO in the diet.  1200-calorie diet.  1500-calorie diet.  5-6 meals per day  Maintain exercise.     RECOMMENDATIONS:  Pt is a potential candidate for bariatric surgery and needs additional medically supervised diet counseling and surveillance  Follow up in one month.      Patient verbalized understanding.     Communicated nutrition plan with bariatric team.     SESSION TIME:  15 minutes

## 2024-11-26 PROBLEM — I15.2 HYPERTENSION ASSOCIATED WITH TYPE 2 DIABETES MELLITUS: Status: ACTIVE | Noted: 2024-08-27

## 2024-11-26 PROBLEM — E11.59 HYPERTENSION ASSOCIATED WITH TYPE 2 DIABETES MELLITUS: Status: ACTIVE | Noted: 2024-08-27

## 2024-11-26 PROBLEM — E11.69 HYPERLIPIDEMIA ASSOCIATED WITH TYPE 2 DIABETES MELLITUS: Status: ACTIVE | Noted: 2024-08-27

## 2024-11-26 PROBLEM — I50.22 CHRONIC SYSTOLIC HEART FAILURE: Status: ACTIVE | Noted: 2024-08-06

## 2024-11-26 PROBLEM — Z87.74 HISTORY OF CONGENITAL CARDIAC SEPTAL DEFECT: Status: ACTIVE | Noted: 2023-10-21

## 2024-11-26 NOTE — PROGRESS NOTES
FAMILY MEDICINE  [facility]    Reason for visit:   Chief Complaint   Patient presents with    Establish Care         SUBJECTIVE: Melissa Carvalho is a 39 y.o. female  - with type 2 diabetes, hyperlipidemia, hypertension, PCOS and rEFHF presents as a new patient to establish care. Last PCP [facility] -     Gynecology: Damaris Reyes MD  Podiatry Berta Burgos DPM  Cardiology: Xu Ritterhammad  Optometry: scheduled with [facility] 1/25/25    Melissa presents for an established care visit to follow up on blood pressure management, diabetes treatment, and to discuss weight loss progress in preparation for potential bariatric surgery.:Melissa has been under care for blood pressure management and diabetes treatment with Ozempic. She reports satisfactory lab work from the summer. She is considering bariatric surgery in February and has an appointment with a nutritionist at the main campus next month to discuss this further.    Melissa has a history of heart failure, diagnosed approximately 2 years ago at age 37. She had shortness of breath at that time, prompting her to see Dr. Raymond. Diagnostic tests, including an EKG and ultrasound, revealed the heart condition. 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 She states it has significantly improved her condition, reducing swelling in her hands and ankles.    For diabetes management, patient is currently taking Metformin twice daily with meals and Ozempic. She reports frequent urination after eating but finds it tolerable. Her blood sugar this morning was 100. Her A1C has improved from being in the 6's to 5.9 now.Melissa reports increased appetite and cravings, particularly for foods she does not typically eat, such as chocolate cake. She also mentions an increased  desire for zero-sugar sodas, specifically Sprite.Melissa has a history of two miscarriages at 6 weeks and has been diagnosed with PCOS, which she believes contributed to the miscarriages. This is part of her motivation for considering bariatric surgery.    Melissa works in security for 3 years. She exercises using equipment at the clinic after hours and does not feel overly short of breath during these activities.Melissa denies chest pain, nausea, or severe stomach issues from Metformin. She also denies any current pregnancy.1. Diabetes Type 2    Current treatment regimen:   metFORMIN (GLUCOPHAGE-XR) 500 MG ER 24hr tablet, Take 1 tablet by mouth twice daily with breakfast and evening meal, Disp: 180 tablet, Rfl: 1  semaglutide (OZEMPIC) 1 mg/dose (4 mg/3 mL), Inject 1 mg into the skin every 7 days., Disp: 3 mL, Rfl: 11    Side effects from treatment: GI upset for metformin rare and tolerable  Complications of diabetes: without complications    Glucometer: Yes  Glucose monitoring: PRN    Lab Results       Component                Value               Date                       HGBA1C                   5.9 (H)             08/29/2024              Diabetes Management Status    ARB/ACEI: yes  Statin: yes  Eye exam: scheduled 1/25/25  Foot exam: 10/28/24    2. Hypertension and CHF rEFHF (30-35%)  - Comorbid issues: CHF  - BP goal < 130/80    Today Melissa Carvalho reports that she is doing well with her BP medications.  She continues to follow regularly with her cardiologist.  She does found that chlorthalidone has been helpful in reducing for fluid.    Current medication treatment:   carvediloL (COREG) 25 MG tablet, Take one tablet by mouth twice daily with food, Disp: 180 tablet, Rfl: 2  chlorthalidone (HYGROTEN) 25 MG Tab, Take 1 tablet (25 mg total) by mouth once daily., Disp: 30 tablet, Rfl: 11   sacubitriL-valsartan (ENTRESTO)  mg per tablet, Take one tablet by mouth twice daily. Stop taking amlodipine, Disp: 180  tablet, Rfl: 2    Medication side effects: denies    Exercise regimen: walks, gym    Home BP cuff: Yes  How often does patient monitoring BP? PRN    3. Congestive heart failure  - rEFF    Cardiologist: Bhumika Ritter    Symptoms:  No chest pain, No shortness of breath, No dyspnea on exertion, No orthopnea, No edema, No palpitations, No syncope    ARB/ACEI: yes  BB: yes  Diuretic: yes    Last hospitalization for CHF: denies    Last Echo:  === Results for orders placed during the hospital encounter of 07/02/24 ===    Echo Saline Bubble? Yes    - Interpretation Summary -  ·  Left Ventricle: The left ventricle is mildly to moderately dilated. Mildly to moderately increased wall thickness. Unable to assess wall motion. There is moderately reduced systolic function with a visually estimated ejection fraction of 30 - 35%.  Technically difficult estimation of LVEF.  Echocontrast could help for more definitive evaluation.  There is indeterminate diastolic function.  ·  Mitral Valve: There is trace to mild regurgitation.  ·  Right Ventricle: Normal right ventricular cavity size. Systolic function is normal.  ·  Pericardium: There is a trivial effusion.  ·  No evidence of intracardiac shunt on bubble study.    Baseline weight: 230 lbs  Wt Readings from Last 5 Encounters:  10/28/24 : 105 kg (231 lb 7.7 oz)  10/15/24 : 105.2 kg (231 lb 13 oz)  10/10/24 : 106.8 kg (235 lb 9 oz)  09/26/24 : 109.5 kg (241 lb 8.2 oz)  08/29/24 : 108.7 kg (239 lb 10.2 oz)    4. BMI 43.72 11/27/2024    Today 11/27/2024: see above    Current weight:   11/27/24 : 104.9 kg (231 lb 6 oz)    Prior weight history:   10/28/24 : 105 kg (231 lb 7.7 oz)  10/15/24 : 105.2 kg (231 lb 13 oz)  10/10/24 : 106.8 kg (235 lb 9 oz)  09/26/24 : 109.5 kg (241 lb 8.2 oz) - Ozempic increased to 1 mg weekly  08/29/24 : 108.7 kg (239 lb 10.2 oz)  08/27/24 : 107.2 kg (236 lb 5.3 oz)  07/02/24 : 111.1 kg (245 lb)  06/11/24 : 111.1 kg (245 lb)  05/13/23 : 111.1 kg (245  lb)    Goal weight: initially <200 lbs but ideal 140's lbs     Current diet: diabetic diet  Current exercise: yes  Current daily activities:     Medications for weight loss:  Ozempic 1 mg weekly  Metformin  mg BID    Comorbidities:   rEFHF, DM2*                    Review of Systems   All other systems reviewed and are negative.      HEALTH MAINTENANCE:   Health Maintenance   Topic Date Due    Eye Exam  Never done    Hemoglobin A1c  02/28/2025    Lipid Panel  08/29/2025    Foot Exam  10/28/2025    TETANUS VACCINE  10/10/2034    Hepatitis C Screening  Completed     Health Maintenance Topics with due status: Not Due       Topic Last Completion Date    Lipid Panel 08/29/2024    Hemoglobin A1c 08/29/2024    TETANUS VACCINE 10/10/2024    Cervical Cancer Screening 10/15/2024    Foot Exam 10/28/2024    RSV Vaccine (Age 60+ and Pregnant patients) Not Due     Health Maintenance Due   Topic Date Due    Diabetes Urine Screening  Never done    Pneumococcal Vaccines (Age 0-64) (1 of 2 - PCV) Never done    Eye Exam  Never done    COVID-19 Vaccine (7 - 2024-25 season) 09/01/2024       HISTORY:   Past Medical History:   Diagnosis Date    Diabetes mellitus     Heart murmur     Hypertension     Miscarriage     x 2 at 4 weeks GA       Past Surgical History:   Procedure Laterality Date    CARDIAC SURGERY      at age 7    NOSE SURGERY Bilateral 07/22/2022    [facility]       Family History   Problem Relation Name Age of Onset    Diabetes Mother      Hypertension Mother      Cancer Father      Drug abuse Brother      Breast cancer Neg Hx      Colon cancer Neg Hx      Ovarian cancer Neg Hx         Social History     Tobacco Use    Smoking status: Never     Passive exposure: Never    Smokeless tobacco: Never   Substance Use Topics    Alcohol use: Not Currently     Comment: Occasionally    Drug use: No       Social History     Social History Narrative    Not on file       ALLERGIES:   Review of patient's allergies  indicates:  No Known Allergies    MEDS:   Current Outpatient Medications on File Prior to Visit   Medication Sig Dispense Refill Last Dose/Taking    ammonium lactate 12 % Crea Apply 1 gramas directed topically Daily. 140 g 1 Taking    atorvastatin (LIPITOR) 40 MG tablet Take 40 mg by mouth once daily.   Taking    blood sugar diagnostic (TRUE METRIX GLUCOSE TEST STRIP) Strp Use strip to test blood sugar once daily 100 each 5 Taking    blood sugar diagnostic Strp Check blood sugar every day 100 each 5 Taking    blood sugar diagnostic Strp Use to check sugar once per day.   Taking    blood-glucose meter Misc Use to check sugar.   Taking    carvediloL (COREG) 25 MG tablet Take one tablet by mouth twice daily with food 180 tablet 2 Taking    carvediloL (COREG) 25 MG tablet Take 1 tablet by mouth 2 (two) times daily with meals.   Taking    cetirizine (ZYRTEC) 10 MG tablet Take 1 tablet (10 mg total) by mouth once daily. 90 tablet 3 Taking    cetirizine (ZYRTEC) 10 MG tablet Oral for 30 Days   Taking    ciclopirox (PENLAC) 8 % Soln APPLY EXTERNALLY TO TOENAILS ONCE DAILY   Taking    clotrimazole (LOTRIMIN) 1 % cream Apply topically 2 (two) times daily.   Taking    clotrimazole (LOTRIMIN) 1 % Soln Apply between the toes two times a day. 30 mL 2 Taking    fluticasone propionate (FLONASE) 50 mcg/actuation nasal spray Fluticasone Propionate 50 MCG/ACT Nasal Suspension QTY: 16 gram Days: 30 Refills: 5  Written: 09/22/22 Patient Instructions: 2 sprays in each nostril once a day prn allergy symptoms   Taking    lancets Misc Use to check sugar once per day.   Taking    metFORMIN (GLUCOPHAGE-XR) 500 MG ER 24hr tablet Take 1 tablet by mouth twice daily with breakfast and evening meal 180 tablet 1 Taking    multivitamin (THERAGRAN) per tablet Take 1 tablet by mouth once daily.   Taking    norethindrone (MICRONOR) 0.35 mg tablet Take 1 tablet (0.35 mg total) by mouth once daily. 28 tablet 12 Taking    sacubitriL-valsartan (ENTRESTO)   mg per tablet Take one tablet by mouth twice daily. Stop taking amlodipine 180 tablet 2 Taking    [DISCONTINUED] chlorthalidone (HYGROTEN) 25 MG Tab Take 1 tablet (25 mg total) by mouth once daily. 30 tablet 11 Taking    [DISCONTINUED] semaglutide (OZEMPIC) 1 mg/dose (4 mg/3 mL) Inject 1 mg into the skin every 7 days. 3 mL 11 Taking    azelastine (ASTELIN) 137 mcg (0.1 %) nasal spray 1 spray 2 (two) times daily. (Patient not taking: Reported on 11/27/2024)   Not Taking    ergocalciferol (VITAMIN D2) 50,000 unit Cap Take one capsule by mouth every week (Patient not taking: Reported on 11/27/2024) 4 capsule 3 Not Taking    [DISCONTINUED] amLODIPine (NORVASC) 5 MG tablet TAKE 1 TABLET BY MOUTH EVERY DAY Oral for 90 Days (Patient not taking: Reported on 11/27/2024)   Not Taking    [DISCONTINUED] ibuprofen (ADVIL,MOTRIN) 600 MG tablet Take 1 tablet (600 mg total) by mouth every 6 (six) hours as needed for Pain. (Patient not taking: Reported on 11/27/2024) 20 tablet 0 Not Taking    [DISCONTINUED] losartan (COZAAR) 100 MG tablet Losartan Potassium 100 MG Oral Tablet QTY: 90 tablet Days: 90 Refills: 0  Written: 11/22/22 Patient Instructions: take 1 tablet once per day (Patient not taking: Reported on 11/27/2024)   Not Taking    [DISCONTINUED] metFORMIN (GLUCOPHAGE-XR) 500 MG ER 24hr tablet TAKE 1 TABLET BY MOUTH TWICE DAILY WITH BREAKFAST AND EVENING MEALS Oral for 90 Days (Patient not taking: Reported on 11/27/2024)   Not Taking    [DISCONTINUED] sacubitriL-valsartan (ENTRESTO) 49-51 mg per tablet Take one tablet by mouth twice daily (Patient not taking: Reported on 11/27/2024) 180 tablet 1 Not Taking    [DISCONTINUED] sars-cov-2, covid-19, (MODERNA COVID-19) 50 mcg/0.25 ml injection (BOOSTER) Inject into the muscle. (Patient not taking: Reported on 11/27/2024) 0.25 mL 0 Not Taking         Vital signs:   Vitals:    11/27/24 1057   BP: 112/79   Patient Position: Sitting   Pulse: 76   SpO2: 100%   Weight: 104.9 kg  "(231 lb 6 oz)   Height: 5' 1" (1.549 m)     Body mass index is 43.72 kg/m².    PHYSICAL EXAM:     Physical Exam  Vitals reviewed.   Constitutional:       General: She is not in acute distress.  HENT:      Head: Normocephalic and atraumatic.      Right Ear: Tympanic membrane and ear canal normal.      Left Ear: Tympanic membrane and ear canal normal.   Eyes:      General: No scleral icterus.     Conjunctiva/sclera: Conjunctivae normal.   Neck:      Thyroid: No thyromegaly.      Vascular: No carotid bruit.   Cardiovascular:      Rate and Rhythm: Normal rate and regular rhythm.      Heart sounds: Normal heart sounds. No murmur heard.     No friction rub. No gallop.   Pulmonary:      Effort: Pulmonary effort is normal.      Breath sounds: Normal breath sounds. No wheezing, rhonchi or rales.   Abdominal:      General: Bowel sounds are normal. There is no distension.      Palpations: Abdomen is soft.      Tenderness: There is no abdominal tenderness.   Musculoskeletal:      Cervical back: Normal range of motion and neck supple.      Right lower leg: No edema.      Left lower leg: No edema.   Lymphadenopathy:      Cervical: No cervical adenopathy.   Skin:     General: Skin is warm.      Capillary Refill: Capillary refill takes less than 2 seconds.   Neurological:      Mental Status: She is alert.             PERTINENT RESULTS:   No visits with results within 1 Week(s) from this visit.   Latest known visit with results is:   Lab Visit on 10/15/2024   Component Date Value Ref Range Status    HIV 1/2 Ag/Ab 10/15/2024 Non-reactive  Non-reactive Final    Treponema Pallidum Antibodies (IgG* 10/15/2024 Nonreactive  Nonreactive Final     Lab Results   Component Value Date    HGBA1C 5.9 (H) 08/29/2024     Lastcmp  Lab Results   Component Value Date    CHOL 194 08/29/2024    CHOL 182 08/09/2023     Lab Results   Component Value Date    HDL 32 (L) 08/29/2024    HDL 33 (L) 08/09/2023     Lab Results   Component Value Date    LDLCALC " 129.6 08/29/2024    LDLCALC 103 08/09/2023     Lab Results   Component Value Date    TRIG 162 (H) 08/29/2024    TRIG 228 (H) 08/09/2023       Lab Results   Component Value Date    CHOLHDL 16.5 (L) 08/29/2024    CHOLHDL 5.52 (H) 08/09/2023       ASSESSMENT/PLAN:    1. Type 2 diabetes mellitus without complication, without long-term current use of insulin  Overview:  Lab Results   Component Value Date    HGBA1C 5.9 (H) 08/29/2024     - well controlled  - for weight loss recommend transition to Mounjaro 7.5 mg weekly]  - continue Metformin at this time for PCOS    Orders:  -     Lipid Panel; Future; Expected date: 05/27/2025  -     Hemoglobin A1C; Future; Expected date: 05/27/2025  -     Microalbumin/Creatinine Ratio, Urine; Future; Expected date: 05/27/2025  -     Comprehensive Metabolic Panel; Future; Expected date: 05/27/2025  -     Hemoglobin A1C; Future; Expected date: 11/27/2024  -     tirzepatide 7.5 mg/0.5 mL PnIj; Inject 7.5 mg into the skin every 7 days.  Dispense: 2 mL; Refill: 0    2. Class 3 severe obesity due to excess calories with serious comorbidity and body mass index (BMI) of 40.0 to 44.9 in adult  Overview:  - discussed recommendation for diet and cardiovascular exercise  - counseling on lifestyle modifications for risk factor reduction  - counseling on management options  - Melissa Carvalho denies history of pancreatitis or heavy alcohol use and denies family and person history of thyroid medullary cancer and MENs  - opts for medication management and recommend change Ozempic 1 mg to Mounjaro 7.5 mg weekly  - counseling regarding new medication including expected results, potential side effects, and appropriate use.  Counseling on treatments of side effects for nausea and constipation. Decreased efficacy of OCP  Recommend increase weight training secondary to concerns for muscle mass loss.  - questions elicited and answered  - encouraged to patient to notify me of any questions or concerns  -  discussed cost being a limiting issue for weight loss medication and directed to  website  - recommend add psyllium husk  - recommend diversify diet and goal of 3-4 colors of the rainbow in every meal and avoid UPF  - she is considering weight reduction surgery        3. PCOS (polycystic ovarian syndrome)  Overview:  - on Metformin  - focusing on weight loss      4. Chronic systolic heart failure  Overview:  6/25/24 Echo The left ventricle is mildly to moderately dilated. Mildly to moderately increased wall thickness. Unable to assess wall motion. There is moderately reduced systolic function with a visually estimated ejection fraction of 30 - 35%. 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 Hypertension associated with type 2 diabetes mellitus  Overview:  - well controlled  - continue current management plan   - patient encouraged to notify me with any changes    Orders:  -     CBC Auto Differential; Future; Expected date: 05/27/2025  -     chlorthalidone (HYGROTEN) 25 MG Tab; Take 1 tablet (25 mg total) by mouth once daily.  Dispense: 90 tablet; Refill: 3    6. Hyperlipidemia associated with type 2 diabetes mellitus  Overview:  Lab Results   Component Value Date    LDLCALC 129.6 08/29/2024     - LDL goal <100  - on atorvastatin 40 mg and followed by Cardiology    Orders:  -     Lipid Panel; Future; Expected date: 05/27/2025  -     Comprehensive Metabolic Panel; Future; Expected date: 05/27/2025    7. Need for vaccination against Streptococcus pneumoniae  -     (VFC) PCV20 (Prevnar 20) IM vaccine (>/= 6 wks)          ORDERS:   Orders Placed This Encounter    Lipid Panel    Hemoglobin A1C    Microalbumin/Creatinine Ratio, Urine    Comprehensive Metabolic Panel    CBC Auto  Differential    Hemoglobin A1C    Ambulatory referral/consult to Cardiology    (VFC) PCV20 (Prevnar 20) IM vaccine (>/= 6 wks)    chlorthalidone (HYGROTEN) 25 MG Tab    tirzepatide 7.5 mg/0.5 mL PnIj       Vaccines recommended: PCV 20 and covid-19    Follow up in about 1 month (around 12/27/2024). or sooner with any concerns        This note is dictated using the M*Modal Fluency Direct word recognition program. There are word recognition mistakes that are occasionally missed on review.    Dr. Ina Worthy D.O.   Family Medicine

## 2024-11-27 ENCOUNTER — OFFICE VISIT (OUTPATIENT)
Dept: PRIMARY CARE CLINIC | Facility: CLINIC | Age: 39
End: 2024-11-27
Attending: FAMILY MEDICINE
Payer: COMMERCIAL

## 2024-11-27 VITALS
HEART RATE: 76 BPM | BODY MASS INDEX: 43.68 KG/M2 | OXYGEN SATURATION: 100 % | SYSTOLIC BLOOD PRESSURE: 112 MMHG | DIASTOLIC BLOOD PRESSURE: 79 MMHG | WEIGHT: 231.38 LBS | HEIGHT: 61 IN

## 2024-11-27 DIAGNOSIS — E11.59 HYPERTENSION ASSOCIATED WITH TYPE 2 DIABETES MELLITUS: ICD-10-CM

## 2024-11-27 DIAGNOSIS — E66.813 CLASS 3 SEVERE OBESITY DUE TO EXCESS CALORIES WITH SERIOUS COMORBIDITY AND BODY MASS INDEX (BMI) OF 40.0 TO 44.9 IN ADULT: ICD-10-CM

## 2024-11-27 DIAGNOSIS — E66.01 CLASS 3 SEVERE OBESITY DUE TO EXCESS CALORIES WITH SERIOUS COMORBIDITY AND BODY MASS INDEX (BMI) OF 40.0 TO 44.9 IN ADULT: ICD-10-CM

## 2024-11-27 DIAGNOSIS — E28.2 PCOS (POLYCYSTIC OVARIAN SYNDROME): ICD-10-CM

## 2024-11-27 DIAGNOSIS — I15.2 HYPERTENSION ASSOCIATED WITH TYPE 2 DIABETES MELLITUS: ICD-10-CM

## 2024-11-27 DIAGNOSIS — E11.69 HYPERLIPIDEMIA ASSOCIATED WITH TYPE 2 DIABETES MELLITUS: ICD-10-CM

## 2024-11-27 DIAGNOSIS — E78.5 HYPERLIPIDEMIA ASSOCIATED WITH TYPE 2 DIABETES MELLITUS: ICD-10-CM

## 2024-11-27 DIAGNOSIS — Z23 NEED FOR VACCINATION AGAINST STREPTOCOCCUS PNEUMONIAE: ICD-10-CM

## 2024-11-27 DIAGNOSIS — E11.9 TYPE 2 DIABETES MELLITUS WITHOUT COMPLICATION, WITHOUT LONG-TERM CURRENT USE OF INSULIN: Primary | ICD-10-CM

## 2024-11-27 DIAGNOSIS — I50.22 CHRONIC SYSTOLIC HEART FAILURE: ICD-10-CM

## 2024-11-27 PROCEDURE — 1160F RVW MEDS BY RX/DR IN RCRD: CPT | Mod: CPTII,S$GLB,, | Performed by: FAMILY MEDICINE

## 2024-11-27 PROCEDURE — 3078F DIAST BP <80 MM HG: CPT | Mod: CPTII,S$GLB,, | Performed by: FAMILY MEDICINE

## 2024-11-27 PROCEDURE — 3044F HG A1C LEVEL LT 7.0%: CPT | Mod: CPTII,S$GLB,, | Performed by: FAMILY MEDICINE

## 2024-11-27 PROCEDURE — 1159F MED LIST DOCD IN RCRD: CPT | Mod: CPTII,S$GLB,, | Performed by: FAMILY MEDICINE

## 2024-11-27 PROCEDURE — 3074F SYST BP LT 130 MM HG: CPT | Mod: CPTII,S$GLB,, | Performed by: FAMILY MEDICINE

## 2024-11-27 PROCEDURE — 4010F ACE/ARB THERAPY RXD/TAKEN: CPT | Mod: CPTII,S$GLB,, | Performed by: FAMILY MEDICINE

## 2024-11-27 PROCEDURE — 3008F BODY MASS INDEX DOCD: CPT | Mod: CPTII,S$GLB,, | Performed by: FAMILY MEDICINE

## 2024-11-27 PROCEDURE — 90677 PCV20 VACCINE IM: CPT | Mod: S$GLB,,, | Performed by: FAMILY MEDICINE

## 2024-11-27 PROCEDURE — 99214 OFFICE O/P EST MOD 30 MIN: CPT | Mod: 25,S$GLB,, | Performed by: FAMILY MEDICINE

## 2024-11-27 PROCEDURE — 90471 IMMUNIZATION ADMIN: CPT | Mod: S$GLB,,, | Performed by: FAMILY MEDICINE

## 2024-11-27 PROCEDURE — 99999 PR PBB SHADOW E&M-EST. PATIENT-LVL V: CPT | Mod: PBBFAC,,, | Performed by: FAMILY MEDICINE

## 2024-11-27 RX ORDER — CHLORTHALIDONE 25 MG/1
25 TABLET ORAL DAILY
Qty: 90 TABLET | Refills: 3 | Status: SHIPPED | OUTPATIENT
Start: 2024-11-27 | End: 2025-11-27

## 2024-12-03 ENCOUNTER — CLINICAL SUPPORT (OUTPATIENT)
Dept: BARIATRICS | Facility: CLINIC | Age: 39
End: 2024-12-03
Payer: COMMERCIAL

## 2024-12-03 VITALS — WEIGHT: 232.38 LBS | BODY MASS INDEX: 43.9 KG/M2

## 2024-12-03 DIAGNOSIS — I15.2 HYPERTENSION ASSOCIATED WITH TYPE 2 DIABETES MELLITUS: ICD-10-CM

## 2024-12-03 DIAGNOSIS — E11.59 HYPERTENSION ASSOCIATED WITH TYPE 2 DIABETES MELLITUS: ICD-10-CM

## 2024-12-03 DIAGNOSIS — E11.9 TYPE 2 DIABETES MELLITUS WITHOUT COMPLICATION, WITHOUT LONG-TERM CURRENT USE OF INSULIN: ICD-10-CM

## 2024-12-03 DIAGNOSIS — Z71.3 DIETARY COUNSELING: Primary | ICD-10-CM

## 2024-12-03 DIAGNOSIS — E78.5 HYPERLIPIDEMIA ASSOCIATED WITH TYPE 2 DIABETES MELLITUS: ICD-10-CM

## 2024-12-03 DIAGNOSIS — E66.01 MORBID OBESITY WITH BMI OF 40.0-44.9, ADULT: ICD-10-CM

## 2024-12-03 DIAGNOSIS — E11.69 HYPERLIPIDEMIA ASSOCIATED WITH TYPE 2 DIABETES MELLITUS: ICD-10-CM

## 2024-12-03 PROCEDURE — 99999 PR PBB SHADOW E&M-EST. PATIENT-LVL I: CPT | Mod: PBBFAC,,, | Performed by: DIETITIAN, REGISTERED

## 2024-12-03 PROCEDURE — 97803 MED NUTRITION INDIV SUBSEQ: CPT | Mod: S$GLB,,, | Performed by: DIETITIAN, REGISTERED

## 2024-12-03 NOTE — PROGRESS NOTES
NUTRITIONAL Note     Referring Physician: Laurie Hector M.D.   Reason for MNT Referral: Follow up assessment for Rios-en-Y gastric bypass work-up     39 y.o. female presents for follow and has lost a total of 4 lbs over the past 6 months by working on dietary and lifestyle changes in preparation for bariatric surgery. No longer taking Ozempic and plans to start Wegovy soon. Still on more of a soft diet of protein fruit smoothies, soups d/t braces.             Past Medical History:   Diagnosis Date    Diabetes mellitus      Heart murmur      Hypertension        CLINICAL DATA:  39 y.o.-year-old Black or  female.  Height: 5 ft. 1 in.  Weight: 232 lbs  IBW: 129 lbs  BMI: 43.9     DAILY NUTRITIONAL NEEDS: pre-op nutritional bariatric guidelines to promote weight loss  [id number] Calories    Grams Protein     NUTRITION & HEALTH HISTORY:  Greatest challenge: eating out/delivery frequency, starchy CHO, portion control, and high-fat diet     Current diet recall: soft diet d/t recent braces     B/L: large [employer] Gladiator with berries and nuts  D: baked chicken breast with broccoli/cheese     Current Diet:  Meal pattern: 2-3 meals + 1-2 protein supplements  Protein supplements: Gladiator smoothie from [employer] + fruit + pb/almonds + greek yogurt.  Snacking: ice cream  Vegetables: Likes a variety. Eats rarely.  Fruits: Likes a variety. Eats 2-3 times per week.  Beverages: water, diet soda, sugar-free beverages/powders/drops, diet/unsweetened tea, and Energy drinks sugar free  Dining out/delivery: 1-2 times Daily. Mostly fast food, restaurants, and take-out.  Cooking at home: Weekly. Monthly. Mostly baked/roast, smothered, air-fried, pan-fried/sauteed, and steamed beef, fish/seafood, chicken/turkey, starchy CHO, vegetables, and beans. Red bean/White beans and rice OR spaghetti and meat sauce OR baked fish/chicken with broccoli/cheese OR smothered turkey necks or steak and brown rice with gravy     Exercise:  Walking  on treadmill/elliptical, squats, light weights (uses PT gym at her work)     Restrictions to exercise: none     Vitamins / Minerals / Herbs:   Vit D Rx weekly  MV for women  Hair skin and nails     Labs:   Reviewed.     Food Allergies:   None known  Tolerates Fairlife milk or almond milk better than regular milk.     Social:  Works regular daytime shifts. [employer] employee. .  Lives with her fiance and step son.  Grocery shopping and food prep done by the pt.  Patient believes the household will be supportive after surgery.  Alcohol: None since Feb. Plans to have occ red wine for [employer] game, bdays, holidays.  Smoking: None.     ASSESSMENT:  Patient demonstrated knowledge of healthy eating behaviors and exercise patterns.  Patient demonstrates willingness to change lifestyle and make behavior modifications AEB weight loss, dietary changes, protein supplements and exercise.     Barriers to Education: none     Stage of change: action     NUTRITION DIAGNOSIS:    Morbid Obesity related to Excessive carbohydrate intake, Excessive calorie intake, and Physical inactivity as evidence by BMI.  Status: Improved     BARIATRIC DIET DISCUSSION/PLAN:  Discussed diet after surgery and related to patient's food record.  Reviewed nutrition guidelines for before and after surgery.  Answered all questions.  Continue to review Bariatric Nutrition Guidebook at home and call with any questions.  Work on Bariatric Nutrition Checklist.  Work on expanding variety of vegetables.  Continue cutting back on starchy CHO in the diet.  1200-calorie diet.  1500-calorie diet.  5-6 meals per day  Maintain exercise.     RECOMMENDATIONS:  Pt is a good candidate for bariatric surgery and needs additional medically supervised diet counseling and surveillance     Patient verbalized understanding.    Will f/u before/after surgery as needed.     Communicated nutrition plan with bariatric team.     SESSION TIME:  30 minutes

## 2024-12-04 ENCOUNTER — TELEPHONE (OUTPATIENT)
Dept: BARIATRICS | Facility: CLINIC | Age: 39
End: 2024-12-04
Payer: COMMERCIAL

## 2024-12-04 ENCOUNTER — DOCUMENTATION ONLY (OUTPATIENT)
Dept: BARIATRICS | Facility: CLINIC | Age: 39
End: 2024-12-04
Payer: COMMERCIAL

## 2024-12-04 DIAGNOSIS — Z01.818 PRE-OP TESTING: Primary | ICD-10-CM

## 2024-12-04 DIAGNOSIS — E66.01 MORBID OBESITY: ICD-10-CM

## 2024-12-04 NOTE — PROGRESS NOTES
Spoke with patient who stated that Juan R with our Psychiatry Department stated that she completed her Psychiatry Evaluation with [facility] already and does not need to repeat with us. Left message for Juan R Alejo to please call me to discuss.

## 2024-12-04 NOTE — PROGRESS NOTES
Spoke with Juan R Alejo and scheduled patient for a Psychiatry evaluation on 12/30/24.    If patient can produce outside Psychiatry Evaluation (she's stated she's completed but not in the record) prior then record will be evaluated and surgery possibly scheduled at that time. Otherwise, she's must wait until Psychiatry Evaluation is completed with our department on the 30th.

## 2024-12-04 NOTE — TELEPHONE ENCOUNTER
Spoke to patient regarding tentative surgery date, which is pending upon confirmed Psychiatry clearance confirmation.

## 2024-12-10 ENCOUNTER — PATIENT MESSAGE (OUTPATIENT)
Dept: BARIATRICS | Facility: CLINIC | Age: 39
End: 2024-12-10
Payer: COMMERCIAL

## 2024-12-11 ENCOUNTER — CLINICAL SUPPORT (OUTPATIENT)
Dept: PSYCHIATRY | Facility: CLINIC | Age: 39
End: 2024-12-11
Payer: COMMERCIAL

## 2024-12-11 DIAGNOSIS — Z00.8 ENCOUNTER FOR PRE-SURGICAL PSYCHOLOGICAL ASSESSMENT: Primary | ICD-10-CM

## 2024-12-16 ENCOUNTER — TELEPHONE (OUTPATIENT)
Dept: BARIATRICS | Facility: CLINIC | Age: 39
End: 2024-12-16
Payer: COMMERCIAL

## 2024-12-16 DIAGNOSIS — E78.5 HYPERLIPIDEMIA ASSOCIATED WITH TYPE 2 DIABETES MELLITUS: ICD-10-CM

## 2024-12-16 DIAGNOSIS — E66.01 CLASS 3 SEVERE OBESITY DUE TO EXCESS CALORIES WITH SERIOUS COMORBIDITY AND BODY MASS INDEX (BMI) OF 40.0 TO 44.9 IN ADULT: Primary | ICD-10-CM

## 2024-12-16 DIAGNOSIS — E11.69 HYPERLIPIDEMIA ASSOCIATED WITH TYPE 2 DIABETES MELLITUS: ICD-10-CM

## 2024-12-16 DIAGNOSIS — E66.813 CLASS 3 SEVERE OBESITY DUE TO EXCESS CALORIES WITH SERIOUS COMORBIDITY AND BODY MASS INDEX (BMI) OF 40.0 TO 44.9 IN ADULT: Primary | ICD-10-CM

## 2024-12-16 DIAGNOSIS — E11.9 TYPE 2 DIABETES MELLITUS WITHOUT COMPLICATION, WITHOUT LONG-TERM CURRENT USE OF INSULIN: ICD-10-CM

## 2024-12-16 DIAGNOSIS — E11.59 HYPERTENSION ASSOCIATED WITH TYPE 2 DIABETES MELLITUS: ICD-10-CM

## 2024-12-16 DIAGNOSIS — I15.2 HYPERTENSION ASSOCIATED WITH TYPE 2 DIABETES MELLITUS: ICD-10-CM

## 2024-12-16 NOTE — PROGRESS NOTES
VIRTUAL VISIT/TELEMEDICINE VISIT  FAMILY MEDICINE  [facility]    The patient location is: [place]  The chief complaint leading to consultation is:   Chief Complaint   Patient presents with    Diabetes     Visit type: Virtual visit with synchronous audio and video   Total time spent: 30 minute  Each patient to whom he or she provides medical services by telemedicine is:  (1) informed of the relationship between the physician and patient and the respective role of any other health care provider with respect to management of the patient; and (2) notified that he or she may decline to receive medical services by telemedicine and may withdraw from such care at any time.      Reason for visit:   Chief Complaint   Patient presents with    Diabetes         SUBJECTIVE: Melissa Carvalho is a 39 y.o. female  - with type 2 diabetes, hyperlipidemia, hypertension, PCOS and rEFHF presents for follow up type 2 diabetes    Gynecology: Damaris Reyes MD  Podiatry Berta Burgos, [place]  Cardiology: Jose Perez MD  Podiatry Berta Mendoza, [place]  Optometry: scheduled with [facility] 1/25/25 Dr. Sheldon    Since her last visit, Melissa Carvalho changed from Ozempic 1 mg weekly to Mounjaro 7.5 mg weekly.  She reports that she is actually tolerating his medication much better.  She reports that she had a lot of heartburn related with the Ozempic if that has since resolved.  She is doing well on the medication.  She does think that it helps with her appetite.  She has lost about 4 lb since her last visit about a month ago.  She would like to continue on this dose.      She did meet with bariatric medicine and is completing her evaluation for consideration for weight loss surgery.    She did see a new cardiologist today and has a establish with Cardiology for her congestive heart failure    1. Diabetes Type 2    Current treatment regimen:   metFORMIN (GLUCOPHAGE-XR) 500 MG ER 24hr tablet, Take 1 tablet  by mouth twice daily with breakfast and evening meal, Disp: 180 tablet, Rfl: 1  Mounjaro 7.5 mg weekly    Side effects from treatment: GI upset for metformin rare and tolerable  Complications of diabetes: without complications    Glucometer: Yes    Glucose monitoring: PRN    Lab Results       Component                Value               Date                       HGBA1C                   5.8 (H)             12/17/2024              Lab Results       Component                Value               Date                       HGBA1C                   5.9 (H)             08/29/2024              Diabetes Management Status    ARB/ACEI: yes  Statin: yes  Eye exam: scheduled 1/23/25  Foot exam: 10/28/24    Comorbidities:   rEFHF, DM2    Wt Readings from Last 10 Encounters:  12/18/24 : 103.2 kg (227 lb 8.2 oz)  12/03/24 : 105.4 kg (232 lb 5.8 oz)  11/27/24 : 104.9 kg (231 lb 6 oz) - changed from Ozempic to Mounjaro   10/28/24 : 105 kg (231 lb 7.7 oz)  10/15/24 : 105.2 kg (231 lb 13 oz)  10/10/24 : 106.8 kg (235 lb 9 oz)  09/26/24 : 109.5 kg (241 lb 8.2 oz)  08/29/24 : 108.7 kg (239 lb 10.2 oz)  08/27/24 : 107.2 kg (236 lb 5.3 oz)  07/02/24 : 111.1 kg (245 lb)                      Review of Systems   HENT:  Negative for hearing loss.    Eyes:  Negative for discharge.   Respiratory:  Negative for wheezing.    Cardiovascular:  Negative for chest pain and palpitations.   Gastrointestinal:  Negative for blood in stool, constipation, diarrhea and vomiting.   Genitourinary:  Negative for dysuria and hematuria.   Musculoskeletal:  Negative for neck pain.   Neurological:  Negative for weakness and headaches.   Endo/Heme/Allergies:  Negative for polydipsia.   All other systems reviewed and are negative.      HEALTH MAINTENANCE:   Health Maintenance   Topic Date Due    Diabetes Urine Screening  Never done    Eye Exam  Never done    COVID-19 Vaccine (7 - 2024-25 season) 09/01/2024    Hemoglobin A1c  06/17/2025    Lipid Panel  08/29/2025     Foot Exam  10/28/2025    Cervical Cancer Screening  10/15/2029    TETANUS VACCINE  10/10/2034    RSV Vaccine (Age 60+ and Pregnant patients) (1 - 1-dose 75+ series) 10/27/2062    Hepatitis C Screening  Completed    Influenza Vaccine  Completed    HIV Screening  Completed    Pneumococcal Vaccines (Age 0-64)  Completed     Health Maintenance Topics with due status: Not Due       Topic Last Completion Date    Lipid Panel 08/29/2024    TETANUS VACCINE 10/10/2024    Cervical Cancer Screening 10/15/2024    Foot Exam 10/28/2024    Hemoglobin A1c 12/17/2024    RSV Vaccine (Age 60+ and Pregnant patients) Not Due     Health Maintenance Due   Topic Date Due    Diabetes Urine Screening  Never done    Eye Exam  Never done    COVID-19 Vaccine (7 - 2024-25 season) 09/01/2024       HISTORY:   Past Medical History:   Diagnosis Date    Diabetes mellitus     Heart murmur     Hypertension     Miscarriage     x 2 at 4 weeks GA       Past Surgical History:   Procedure Laterality Date    CARDIAC SURGERY      at age 7    NOSE SURGERY Bilateral 07/22/2022    [facility]       Family History   Problem Relation Name Age of Onset    Diabetes Mother      Hypertension Mother      Cancer Father      Drug abuse Brother      Breast cancer Neg Hx      Colon cancer Neg Hx      Ovarian cancer Neg Hx         Social History     Tobacco Use    Smoking status: Never     Passive exposure: Never    Smokeless tobacco: Never   Substance Use Topics    Alcohol use: Not Currently     Comment: Occasionally    Drug use: No       Social History     Social History Narrative    Single in a relationship. . Exercises regularly. No children. History of miscarriages       ALLERGIES:   Review of patient's allergies indicates:  No Known Allergies    MEDS:   Current Outpatient Medications on File Prior to Visit   Medication Sig Dispense Refill Last Dose/Taking    ammonium lactate 12 % Crea Apply 1 gramas directed topically Daily. 140 g 1     atorvastatin (LIPITOR) 40  MG tablet Take 1 tablet (40 mg total) by mouth once daily. 90 tablet 3     azelastine (ASTELIN) 137 mcg (0.1 %) nasal spray 1 spray 2 (two) times daily. (Patient not taking: Reported on 12/18/2024)       blood sugar diagnostic (TRUE METRIX GLUCOSE TEST STRIP) Strp Use strip to test blood sugar once daily 100 each 5     blood sugar diagnostic Strp Check blood sugar every day 100 each 5     blood sugar diagnostic Strp Use to check sugar once per day.       blood-glucose meter Misc Use to check sugar.       carvediloL (COREG) 25 MG tablet Take 1 tablet (25 mg total) by mouth 2 (two) times daily with meals. 180 tablet 3     cetirizine (ZYRTEC) 10 MG tablet Take 1 tablet (10 mg total) by mouth once daily. 90 tablet 3     cetirizine (ZYRTEC) 10 MG tablet Oral for 30 Days       chlorthalidone (HYGROTEN) 25 MG Tab Take 1 tablet (25 mg total) by mouth once daily. 90 tablet 3     ciclopirox (PENLAC) 8 % Soln APPLY EXTERNALLY TO TOENAILS ONCE DAILY       clotrimazole (LOTRIMIN) 1 % cream Apply topically 2 (two) times daily.       clotrimazole (LOTRIMIN) 1 % Soln Apply between the toes two times a day. 30 mL 2     fluticasone propionate (FLONASE) 50 mcg/actuation nasal spray Fluticasone Propionate 50 MCG/ACT Nasal Suspension QTY: 16 gram Days: 30 Refills: 5  Written: 09/22/22 Patient Instructions: 2 sprays in each nostril once a day prn allergy symptoms       lancets Misc Use to check sugar once per day.       metFORMIN (GLUCOPHAGE-XR) 500 MG ER 24hr tablet Take 1 tablet by mouth twice daily with breakfast and evening meal (Patient not taking: Reported on 12/18/2024) 180 tablet 1     multivitamin (THERAGRAN) per tablet Take 1 tablet by mouth once daily. (Patient not taking: Reported on 12/18/2024)       norethindrone (MICRONOR) 0.35 mg tablet Take 1 tablet (0.35 mg total) by mouth once daily. 28 tablet 12     sacubitriL-valsartan (ENTRESTO)  mg per tablet Take one tablet by mouth twice daily. Stop taking amlodipine 180  "tablet 2     [DISCONTINUED] atorvastatin (LIPITOR) 40 MG tablet Take 40 mg by mouth once daily.       [DISCONTINUED] carvediloL (COREG) 25 MG tablet Take one tablet by mouth twice daily with food 180 tablet 2     [DISCONTINUED] carvediloL (COREG) 25 MG tablet Take 1 tablet by mouth 2 (two) times daily with meals.       [DISCONTINUED] ergocalciferol (VITAMIN D2) 50,000 unit Cap Take one capsule by mouth every week 4 capsule 3     [DISCONTINUED] tirzepatide 7.5 mg/0.5 mL PnIj Inject 7.5 mg into the skin every 7 days. 2 mL 0          Vital signs:   Vitals:    12/18/24 0938   Weight: 103 kg (227 lb)   Height: 5' 1" (1.549 m)       Body mass index is 42.89 kg/m².    PHYSICAL EXAM:     Physical Exam  Constitutional:       General: She is not in acute distress.  Pulmonary:      Effort: Pulmonary effort is normal. No respiratory distress.   Neurological:      Mental Status: She is alert.   Psychiatric:         Speech: Speech normal.             PERTINENT RESULTS:   Lab Results   Component Value Date    HGBA1C 5.8 (H) 12/17/2024       ASSESSMENT/PLAN:    1. Type 2 diabetes mellitus without complication, without long-term current use of insulin  Overview:  Lab Results   Component Value Date    HGBA1C 5.8 (H) 12/17/2024     - well controlled  - continue current management plan   - patient encouraged to notify me with any changes    Orders:  -     tirzepatide 7.5 mg/0.5 mL PnIj; Inject 7.5 mg into the skin every 7 days.  Dispense: 6 mL; Refill: 3    2. Class 3 severe obesity due to excess calories with serious comorbidity and body mass index (BMI) of 40.0 to 44.9 in adult  Overview:  - discussed recommendation for diet and cardiovascular exercise  - counseling on lifestyle modifications for risk factor reduction  - counseling on management options  - considering weight reduction surgery              ORDERS:   Orders Placed This Encounter    tirzepatide 7.5 mg/0.5 mL PnIj         Vaccines recommended:  covid-19    Follow up in " about 6 months (around 6/18/2025) for diabetes, Annual, Labs. or sooner with any concerns        This note is dictated using the M*Modal Fluency Direct word recognition program. There are word recognition mistakes that are occasionally missed on review.    Dr. Ina Worthy D.O.   [facility]

## 2024-12-16 NOTE — TELEPHONE ENCOUNTER
----- Message from Deanna sent at 12/16/2024  2:25 PM CST -----  Regarding: pt advice  Contact: [phone]  Pt is requesting a call from someone in the office and is asking for a return call back soon. Thanks.     Reason for call: discuss plan of care for appt      Patient's DX:     Patient requesting call back or [facility] msg:  [phone]

## 2024-12-16 NOTE — TELEPHONE ENCOUNTER
Spoke with patient regarding rescheduling her pre-op appointment. Pre-Op was scheduled under the premise that she was to get outside psych records of evaluation and clearance. She stated that she thought she had the evaluation but actually did not. She is now scheduled for 12/30/24 with our Psychiatry Department for Evaluation. Will follow up to schedule her Surgery once completed.

## 2024-12-16 NOTE — TELEPHONE ENCOUNTER
Called and spoke with the pt.  She completed her last psych on 10/17/2023.  Explained that the psych eval needs to be within a year of the surgery date.

## 2024-12-17 ENCOUNTER — LAB VISIT (OUTPATIENT)
Dept: LAB | Facility: HOSPITAL | Age: 39
End: 2024-12-17
Attending: FAMILY MEDICINE
Payer: COMMERCIAL

## 2024-12-17 DIAGNOSIS — E11.9 TYPE 2 DIABETES MELLITUS WITHOUT COMPLICATION, WITHOUT LONG-TERM CURRENT USE OF INSULIN: ICD-10-CM

## 2024-12-17 DIAGNOSIS — I10 PRIMARY HYPERTENSION: ICD-10-CM

## 2024-12-17 DIAGNOSIS — Z79.4 TYPE 2 DIABETES MELLITUS WITHOUT COMPLICATION, WITH LONG-TERM CURRENT USE OF INSULIN: ICD-10-CM

## 2024-12-17 DIAGNOSIS — E66.01 CLASS 3 SEVERE OBESITY DUE TO EXCESS CALORIES WITH SERIOUS COMORBIDITY AND BODY MASS INDEX (BMI) OF 40.0 TO 44.9 IN ADULT: ICD-10-CM

## 2024-12-17 DIAGNOSIS — E11.9 TYPE 2 DIABETES MELLITUS WITHOUT COMPLICATION, WITH LONG-TERM CURRENT USE OF INSULIN: ICD-10-CM

## 2024-12-17 DIAGNOSIS — E78.5 HYPERLIPIDEMIA, UNSPECIFIED HYPERLIPIDEMIA TYPE: ICD-10-CM

## 2024-12-17 DIAGNOSIS — E66.813 CLASS 3 SEVERE OBESITY DUE TO EXCESS CALORIES WITH SERIOUS COMORBIDITY AND BODY MASS INDEX (BMI) OF 40.0 TO 44.9 IN ADULT: ICD-10-CM

## 2024-12-17 DIAGNOSIS — E66.813 CLASS 3 SEVERE OBESITY DUE TO EXCESS CALORIES WITHOUT SERIOUS COMORBIDITY WITH BODY MASS INDEX (BMI) OF 40.0 TO 44.9 IN ADULT: ICD-10-CM

## 2024-12-17 DIAGNOSIS — E11.59 HYPERTENSION ASSOCIATED WITH TYPE 2 DIABETES MELLITUS: ICD-10-CM

## 2024-12-17 DIAGNOSIS — E78.5 HYPERLIPIDEMIA ASSOCIATED WITH TYPE 2 DIABETES MELLITUS: ICD-10-CM

## 2024-12-17 DIAGNOSIS — E66.01 CLASS 3 SEVERE OBESITY DUE TO EXCESS CALORIES WITHOUT SERIOUS COMORBIDITY WITH BODY MASS INDEX (BMI) OF 40.0 TO 44.9 IN ADULT: ICD-10-CM

## 2024-12-17 DIAGNOSIS — E11.69 HYPERLIPIDEMIA ASSOCIATED WITH TYPE 2 DIABETES MELLITUS: ICD-10-CM

## 2024-12-17 DIAGNOSIS — I15.2 HYPERTENSION ASSOCIATED WITH TYPE 2 DIABETES MELLITUS: ICD-10-CM

## 2024-12-17 LAB
25(OH)D3+25(OH)D2 SERPL-MCNC: 24 NG/ML (ref 30–96)
25(OH)D3+25(OH)D2 SERPL-MCNC: 24 NG/ML (ref 30–96)
ALBUMIN SERPL BCP-MCNC: 3.6 G/DL (ref 3.5–5.2)
ALBUMIN SERPL BCP-MCNC: 3.6 G/DL (ref 3.5–5.2)
ALP SERPL-CCNC: 50 U/L (ref 40–150)
ALP SERPL-CCNC: 50 U/L (ref 40–150)
ALT SERPL W/O P-5'-P-CCNC: 17 U/L (ref 10–44)
ALT SERPL W/O P-5'-P-CCNC: 17 U/L (ref 10–44)
ANION GAP SERPL CALC-SCNC: 9 MMOL/L (ref 8–16)
AST SERPL-CCNC: 15 U/L (ref 10–40)
AST SERPL-CCNC: 15 U/L (ref 10–40)
BASOPHILS # BLD AUTO: 0.03 K/UL (ref 0–0.2)
BASOPHILS NFR BLD: 0.5 % (ref 0–1.9)
BILIRUB DIRECT SERPL-MCNC: 0.1 MG/DL (ref 0.1–0.3)
BILIRUB SERPL-MCNC: 0.3 MG/DL (ref 0.1–1)
BILIRUB SERPL-MCNC: 0.3 MG/DL (ref 0.1–1)
BUN SERPL-MCNC: 20 MG/DL (ref 6–20)
CALCIUM SERPL-MCNC: 10 MG/DL (ref 8.7–10.5)
CHLORIDE SERPL-SCNC: 104 MMOL/L (ref 95–110)
CO2 SERPL-SCNC: 24 MMOL/L (ref 23–29)
CREAT SERPL-MCNC: 1.2 MG/DL (ref 0.5–1.4)
DIFFERENTIAL METHOD BLD: ABNORMAL
EOSINOPHIL # BLD AUTO: 0.1 K/UL (ref 0–0.5)
EOSINOPHIL NFR BLD: 0.8 % (ref 0–8)
ERYTHROCYTE [DISTWIDTH] IN BLOOD BY AUTOMATED COUNT: 16.3 % (ref 11.5–14.5)
EST. GFR  (NO RACE VARIABLE): 59.8 ML/MIN/1.73 M^2
ESTIMATED AVG GLUCOSE: 120 MG/DL (ref 68–131)
FOLATE SERPL-MCNC: 5.8 NG/ML (ref 4–24)
GLUCOSE SERPL-MCNC: 90 MG/DL (ref 70–110)
HBA1C MFR BLD: 5.8 % (ref 4–5.6)
HCT VFR BLD AUTO: 35.8 % (ref 37–48.5)
HGB BLD-MCNC: 11.1 G/DL (ref 12–16)
IMM GRANULOCYTES # BLD AUTO: 0.01 K/UL (ref 0–0.04)
IMM GRANULOCYTES NFR BLD AUTO: 0.2 % (ref 0–0.5)
IRON SERPL-MCNC: 49 UG/DL (ref 30–160)
LYMPHOCYTES # BLD AUTO: 2.4 K/UL (ref 1–4.8)
LYMPHOCYTES NFR BLD: 39.7 % (ref 18–48)
MAGNESIUM SERPL-MCNC: 1.6 MG/DL (ref 1.6–2.6)
MCH RBC QN AUTO: 27.6 PG (ref 27–31)
MCHC RBC AUTO-ENTMCNC: 31 G/DL (ref 32–36)
MCV RBC AUTO: 89 FL (ref 82–98)
MONOCYTES # BLD AUTO: 0.7 K/UL (ref 0.3–1)
MONOCYTES NFR BLD: 11.2 % (ref 4–15)
NEUTROPHILS # BLD AUTO: 2.9 K/UL (ref 1.8–7.7)
NEUTROPHILS NFR BLD: 47.6 % (ref 38–73)
NRBC BLD-RTO: 0 /100 WBC
PHOSPHATE SERPL-MCNC: 3.5 MG/DL (ref 2.7–4.5)
PLATELET # BLD AUTO: 259 K/UL (ref 150–450)
PMV BLD AUTO: 11.1 FL (ref 9.2–12.9)
POTASSIUM SERPL-SCNC: 3.9 MMOL/L (ref 3.5–5.1)
PROT SERPL-MCNC: 7.7 G/DL (ref 6–8.4)
PROT SERPL-MCNC: 7.7 G/DL (ref 6–8.4)
RBC # BLD AUTO: 4.02 M/UL (ref 4–5.4)
SATURATED IRON: 14 % (ref 20–50)
SODIUM SERPL-SCNC: 137 MMOL/L (ref 136–145)
T3 SERPL-MCNC: 95 NG/DL (ref 60–180)
T4 FREE SERPL-MCNC: 0.87 NG/DL (ref 0.71–1.51)
T4 SERPL-MCNC: 6.3 UG/DL (ref 4.5–11.5)
TOTAL IRON BINDING CAPACITY: 343 UG/DL (ref 250–450)
TRANSFERRIN SERPL-MCNC: 232 MG/DL (ref 200–375)
TSH SERPL DL<=0.005 MIU/L-ACNC: 2.46 UIU/ML (ref 0.4–4)
VIT B12 SERPL-MCNC: 908 PG/ML (ref 210–950)
WBC # BLD AUTO: 6.07 K/UL (ref 3.9–12.7)

## 2024-12-17 PROCEDURE — 82306 VITAMIN D 25 HYDROXY: CPT | Performed by: NURSE PRACTITIONER

## 2024-12-17 PROCEDURE — 85025 COMPLETE CBC W/AUTO DIFF WBC: CPT | Performed by: SURGERY

## 2024-12-17 PROCEDURE — 82607 VITAMIN B-12: CPT | Performed by: NURSE PRACTITIONER

## 2024-12-17 PROCEDURE — 80076 HEPATIC FUNCTION PANEL: CPT | Performed by: NURSE PRACTITIONER

## 2024-12-17 PROCEDURE — 84425 ASSAY OF VITAMIN B-1: CPT | Performed by: NURSE PRACTITIONER

## 2024-12-17 PROCEDURE — 36415 COLL VENOUS BLD VENIPUNCTURE: CPT | Mod: PN | Performed by: FAMILY MEDICINE

## 2024-12-17 PROCEDURE — 84443 ASSAY THYROID STIM HORMONE: CPT | Performed by: NURSE PRACTITIONER

## 2024-12-17 PROCEDURE — 84480 ASSAY TRIIODOTHYRONINE (T3): CPT | Performed by: NURSE PRACTITIONER

## 2024-12-17 PROCEDURE — 86677 HELICOBACTER PYLORI ANTIBODY: CPT | Performed by: NURSE PRACTITIONER

## 2024-12-17 PROCEDURE — 83036 HEMOGLOBIN GLYCOSYLATED A1C: CPT | Performed by: FAMILY MEDICINE

## 2024-12-17 PROCEDURE — 84436 ASSAY OF TOTAL THYROXINE: CPT | Performed by: NURSE PRACTITIONER

## 2024-12-17 PROCEDURE — 84466 ASSAY OF TRANSFERRIN: CPT | Performed by: NURSE PRACTITIONER

## 2024-12-17 PROCEDURE — 80053 COMPREHEN METABOLIC PANEL: CPT | Performed by: SURGERY

## 2024-12-17 PROCEDURE — 84100 ASSAY OF PHOSPHORUS: CPT | Performed by: NURSE PRACTITIONER

## 2024-12-17 PROCEDURE — 83735 ASSAY OF MAGNESIUM: CPT | Performed by: NURSE PRACTITIONER

## 2024-12-17 PROCEDURE — 82746 ASSAY OF FOLIC ACID SERUM: CPT | Performed by: NURSE PRACTITIONER

## 2024-12-17 PROCEDURE — 84439 ASSAY OF FREE THYROXINE: CPT | Performed by: NURSE PRACTITIONER

## 2024-12-18 ENCOUNTER — PATIENT MESSAGE (OUTPATIENT)
Dept: BARIATRICS | Facility: CLINIC | Age: 39
End: 2024-12-18
Payer: COMMERCIAL

## 2024-12-18 ENCOUNTER — PATIENT MESSAGE (OUTPATIENT)
Dept: PRIMARY CARE CLINIC | Facility: CLINIC | Age: 39
End: 2024-12-18

## 2024-12-18 ENCOUNTER — OFFICE VISIT (OUTPATIENT)
Dept: PRIMARY CARE CLINIC | Facility: CLINIC | Age: 39
End: 2024-12-18
Attending: FAMILY MEDICINE
Payer: COMMERCIAL

## 2024-12-18 ENCOUNTER — OFFICE VISIT (OUTPATIENT)
Dept: CARDIOLOGY | Facility: CLINIC | Age: 39
End: 2024-12-18
Payer: COMMERCIAL

## 2024-12-18 VITALS — BODY MASS INDEX: 42.86 KG/M2 | HEIGHT: 61 IN | WEIGHT: 227 LBS

## 2024-12-18 VITALS
BODY MASS INDEX: 42.95 KG/M2 | DIASTOLIC BLOOD PRESSURE: 80 MMHG | HEART RATE: 86 BPM | WEIGHT: 227.5 LBS | SYSTOLIC BLOOD PRESSURE: 128 MMHG | OXYGEN SATURATION: 98 % | HEIGHT: 61 IN

## 2024-12-18 DIAGNOSIS — E11.9 TYPE 2 DIABETES MELLITUS WITHOUT COMPLICATION, WITHOUT LONG-TERM CURRENT USE OF INSULIN: Primary | ICD-10-CM

## 2024-12-18 DIAGNOSIS — E66.813 CLASS 3 SEVERE OBESITY DUE TO EXCESS CALORIES WITH SERIOUS COMORBIDITY AND BODY MASS INDEX (BMI) OF 40.0 TO 44.9 IN ADULT: ICD-10-CM

## 2024-12-18 DIAGNOSIS — E66.01 CLASS 3 SEVERE OBESITY DUE TO EXCESS CALORIES WITH SERIOUS COMORBIDITY AND BODY MASS INDEX (BMI) OF 40.0 TO 44.9 IN ADULT: ICD-10-CM

## 2024-12-18 DIAGNOSIS — E11.9 TYPE 2 DIABETES MELLITUS WITHOUT COMPLICATION, WITHOUT LONG-TERM CURRENT USE OF INSULIN: ICD-10-CM

## 2024-12-18 DIAGNOSIS — I50.22 CHRONIC SYSTOLIC HEART FAILURE: ICD-10-CM

## 2024-12-18 DIAGNOSIS — E11.59 HYPERTENSION ASSOCIATED WITH TYPE 2 DIABETES MELLITUS: Primary | ICD-10-CM

## 2024-12-18 DIAGNOSIS — I15.2 HYPERTENSION ASSOCIATED WITH TYPE 2 DIABETES MELLITUS: Primary | ICD-10-CM

## 2024-12-18 DIAGNOSIS — G47.33 OSA ON CPAP: ICD-10-CM

## 2024-12-18 LAB — H PYLORI IGG SERPL QL IA: NEGATIVE

## 2024-12-18 PROCEDURE — 3079F DIAST BP 80-89 MM HG: CPT | Mod: CPTII,S$GLB,, | Performed by: INTERNAL MEDICINE

## 2024-12-18 PROCEDURE — 1160F RVW MEDS BY RX/DR IN RCRD: CPT | Mod: CPTII,95,, | Performed by: FAMILY MEDICINE

## 2024-12-18 PROCEDURE — 99214 OFFICE O/P EST MOD 30 MIN: CPT | Mod: 95,,, | Performed by: FAMILY MEDICINE

## 2024-12-18 PROCEDURE — 3008F BODY MASS INDEX DOCD: CPT | Mod: CPTII,S$GLB,, | Performed by: INTERNAL MEDICINE

## 2024-12-18 PROCEDURE — 3074F SYST BP LT 130 MM HG: CPT | Mod: CPTII,S$GLB,, | Performed by: INTERNAL MEDICINE

## 2024-12-18 PROCEDURE — 1159F MED LIST DOCD IN RCRD: CPT | Mod: CPTII,S$GLB,, | Performed by: INTERNAL MEDICINE

## 2024-12-18 PROCEDURE — 99999 PR PBB SHADOW E&M-EST. PATIENT-LVL V: CPT | Mod: PBBFAC,,, | Performed by: INTERNAL MEDICINE

## 2024-12-18 PROCEDURE — 4010F ACE/ARB THERAPY RXD/TAKEN: CPT | Mod: CPTII,S$GLB,, | Performed by: INTERNAL MEDICINE

## 2024-12-18 PROCEDURE — 99204 OFFICE O/P NEW MOD 45 MIN: CPT | Mod: S$GLB,,, | Performed by: INTERNAL MEDICINE

## 2024-12-18 PROCEDURE — 1159F MED LIST DOCD IN RCRD: CPT | Mod: CPTII,95,, | Performed by: FAMILY MEDICINE

## 2024-12-18 PROCEDURE — 3008F BODY MASS INDEX DOCD: CPT | Mod: CPTII,95,, | Performed by: FAMILY MEDICINE

## 2024-12-18 PROCEDURE — 3044F HG A1C LEVEL LT 7.0%: CPT | Mod: CPTII,95,, | Performed by: FAMILY MEDICINE

## 2024-12-18 PROCEDURE — 4010F ACE/ARB THERAPY RXD/TAKEN: CPT | Mod: CPTII,95,, | Performed by: FAMILY MEDICINE

## 2024-12-18 PROCEDURE — 3044F HG A1C LEVEL LT 7.0%: CPT | Mod: CPTII,S$GLB,, | Performed by: INTERNAL MEDICINE

## 2024-12-18 RX ORDER — CARVEDILOL 25 MG/1
25 TABLET ORAL 2 TIMES DAILY WITH MEALS
Qty: 180 TABLET | Refills: 3 | Status: SHIPPED | OUTPATIENT
Start: 2024-12-18

## 2024-12-18 RX ORDER — ATORVASTATIN CALCIUM 40 MG/1
40 TABLET, FILM COATED ORAL DAILY
Qty: 90 TABLET | Refills: 3 | Status: SHIPPED | OUTPATIENT
Start: 2024-12-18

## 2024-12-18 RX ORDER — ERGOCALCIFEROL 1.25 MG/1
50000 CAPSULE ORAL
Qty: 12 CAPSULE | Refills: 0 | Status: SHIPPED | OUTPATIENT
Start: 2024-12-18 | End: 2025-03-12

## 2024-12-18 NOTE — PROGRESS NOTES
Subjective:   Patient ID:  Melissa Carvalho is a 39 y.o. female who presents for evaluation of No chief complaint on file.      HPI: Very pleasant young woman with chronic HFrEF - EF last known to be 30-35% (July 2024) - and a history of a congenital heart defect surgically repaired at age 7 presenting to [facility] care.    She feels well with no new symptoms or cardiovascular complaints and no change in exercise capacity.  She denies chest discomfort, MENDIETA, palpitations, PND/orthopnea, lightheadedness and syncope.  She works security at [employer] and climbs stairs without difficulty.    She was taking semaglutide until recently when that was switched to tirzepatide.  She does not take an SGLT2 inhibitor or spironolactone.    She's planning bariatric surgery in February.  She's chosen the gastric sleeve procedure.    Past Medical History:   Diagnosis Date    Diabetes mellitus     Heart murmur     Hypertension     Miscarriage     x 2 at 4 weeks GA       Past Surgical History:   Procedure Laterality Date    CARDIAC SURGERY      at age 7    NOSE SURGERY Bilateral 07/22/2022    [facility] [place]       Social History     Tobacco Use    Smoking status: Never     Passive exposure: Never    Smokeless tobacco: Never   Substance Use Topics    Alcohol use: Not Currently     Comment: Occasionally    Drug use: No       Family History   Problem Relation Name Age of Onset    Diabetes Mother      Hypertension Mother      Cancer Father      Drug abuse Brother      Breast cancer Neg Hx      Colon cancer Neg Hx      Ovarian cancer Neg Hx         Current Outpatient Medications   Medication Sig    ammonium lactate 12 % Crea Apply 1 gramas directed topically Daily.    atorvastatin (LIPITOR) 40 MG tablet Take 40 mg by mouth once daily.    azelastine (ASTELIN) 137 mcg (0.1 %) nasal spray 1 spray 2 (two) times daily. (Patient not taking: Reported on 11/27/2024)    blood sugar diagnostic (TRUE METRIX GLUCOSE TEST STRIP) Strp Use strip to test blood sugar  once daily    blood sugar diagnostic Strp Check blood sugar every day    blood sugar diagnostic Strp Use to check sugar once per day.    blood-glucose meter Misc Use to check sugar.    carvediloL (COREG) 25 MG tablet Take one tablet by mouth twice daily with food    carvediloL (COREG) 25 MG tablet Take 1 tablet by mouth 2 (two) times daily with meals.    cetirizine (ZYRTEC) 10 MG tablet Take 1 tablet (10 mg total) by mouth once daily.    cetirizine (ZYRTEC) 10 MG tablet Oral for 30 Days    chlorthalidone (HYGROTEN) 25 MG Tab Take 1 tablet (25 mg total) by mouth once daily.    ciclopirox (PENLAC) 8 % Soln APPLY EXTERNALLY TO TOENAILS ONCE DAILY    clotrimazole (LOTRIMIN) 1 % cream Apply topically 2 (two) times daily.    clotrimazole (LOTRIMIN) 1 % Soln Apply between the toes two times a day.    ergocalciferol (VITAMIN D2) 50,000 unit Cap Take one capsule by mouth every week (Patient not taking: Reported on 11/27/2024)    fluticasone propionate (FLONASE) 50 mcg/actuation nasal spray Fluticasone Propionate 50 MCG/ACT Nasal Suspension QTY: 16 gram Days: 30 Refills: 5  Written: 09/22/22 Patient Instructions: 2 sprays in each nostril once a day prn allergy symptoms    lancets Misc Use to check sugar once per day.    metFORMIN (GLUCOPHAGE-XR) 500 MG ER 24hr tablet Take 1 tablet by mouth twice daily with breakfast and evening meal    multivitamin (THERAGRAN) per tablet Take 1 tablet by mouth once daily.    norethindrone (MICRONOR) 0.35 mg tablet Take 1 tablet (0.35 mg total) by mouth once daily.    sacubitriL-valsartan (ENTRESTO)  mg per tablet Take one tablet by mouth twice daily. Stop taking amlodipine    tirzepatide 7.5 mg/0.5 mL PnIj Inject 7.5 mg into the skin every 7 days.     No current facility-administered medications for this visit.       Review of patient's allergies indicates:  No Known Allergies    ROS  The review of systems is negative except as above.    Objective:   Physical Exam  Vitals reviewed.    Constitutional:       Appearance: She is well-developed.   HENT:      Head: Normocephalic and atraumatic.   Eyes:      General: No scleral icterus.     Conjunctiva/sclera: Conjunctivae normal.   Neck:      Vascular: No JVD.   Cardiovascular:      Rate and Rhythm: Normal rate and regular rhythm.      Pulses: Intact distal pulses.      Heart sounds: Normal heart sounds. No murmur heard.     No friction rub. No gallop.   Pulmonary:      Effort: Pulmonary effort is normal.      Breath sounds: Normal breath sounds. No wheezing or rales.   Abdominal:      General: Bowel sounds are normal. There is no distension.      Palpations: Abdomen is soft.      Tenderness: There is no abdominal tenderness.   Musculoskeletal:         General: Normal range of motion.      Cervical back: Normal range of motion and neck supple.   Skin:     General: Skin is warm and dry.      Findings: No erythema or rash.   Neurological:      Mental Status: She is alert and oriented to person, place, and time.   Psychiatric:         Behavior: Behavior normal.         Thought Content: Thought content normal.         Judgment: Judgment normal.     Lab Results   Component Value Date    WBC 6.07 12/17/2024    HGB 11.1 (L) 12/17/2024    HCT 35.8 (L) 12/17/2024    MCV 89 12/17/2024     12/17/2024         Chemistry        Component Value Date/Time     12/17/2024 0740    K 3.9 12/17/2024 0740     12/17/2024 0740    CO2 24 12/17/2024 0740    BUN 20 12/17/2024 0740    CREATININE 1.2 12/17/2024 0740    GLU 90 12/17/2024 0740        Component Value Date/Time    CALCIUM 10.0 12/17/2024 0740    ALKPHOS 50 12/17/2024 0740    ALKPHOS 50 12/17/2024 0740    AST 15 12/17/2024 0740    AST 15 12/17/2024 0740    ALT 17 12/17/2024 0740    ALT 17 12/17/2024 0740    BILITOT 0.3 12/17/2024 0740    BILITOT 0.3 12/17/2024 0740    ESTGFRAFRICA >60.0 06/19/2022 0014    EGFRNONAA >60.0 06/19/2022 0014            Lab Results   Component Value Date    CHOL 194  08/29/2024    CHOL 182 08/09/2023     Lab Results   Component Value Date    HDL 32 (L) 08/29/2024    HDL 33 (L) 08/09/2023     Lab Results   Component Value Date    LDLCALC 129.6 08/29/2024    LDLCALC 103 08/09/2023     Lab Results   Component Value Date    TRIG 162 (H) 08/29/2024    TRIG 228 (H) 08/09/2023     Lab Results   Component Value Date    CHOLHDL 16.5 (L) 08/29/2024    CHOLHDL 5.52 (H) 08/09/2023       Lab Results   Component Value Date    TSH 2.455 12/17/2024       Lab Results   Component Value Date    HGBA1C 5.8 (H) 12/17/2024         Assessment:     1. Hypertension associated with type 2 diabetes mellitus    2. Chronic systolic heart failure    3. Type 2 diabetes mellitus without complication, without long-term current use of insulin    4. Class 3 severe obesity due to excess calories with serious comorbidity and body mass index (BMI) of 40.0 to 44.9 in adult    5. CIPRIANO on CPAP        Plan:     She has no angina, heart failure, or unstable arrhythmia.  No further cardiovascular testing is required prior to proceeding to the operating room.     Continue current medicines.  Renew atorvastatin - she has not been taking this.    There is not a compelling argument to start spironolactone or an SGLT2 given NYHA class 1 symptoms.  Also no need for primary prevention ICD.    Diet/exercise goals reinforced.    F/U 12 months

## 2024-12-23 LAB — VIT B1 BLD-MCNC: 42 UG/L (ref 38–122)

## 2024-12-30 ENCOUNTER — OFFICE VISIT (OUTPATIENT)
Dept: PSYCHIATRY | Facility: CLINIC | Age: 39
End: 2024-12-30
Payer: COMMERCIAL

## 2024-12-30 DIAGNOSIS — Z01.818 PRE-OP TESTING: ICD-10-CM

## 2024-12-30 DIAGNOSIS — Z00.8 ENCOUNTER FOR PRE-SURGICAL PSYCHOLOGICAL ASSESSMENT: Primary | ICD-10-CM

## 2024-12-30 DIAGNOSIS — E66.01 MORBID OBESITY: ICD-10-CM

## 2024-12-30 NOTE — PROGRESS NOTES
The patient location is: [place]  The chief complaint leading to consultation is: encounter for presurgical psychological evaluation    Visit type: audiovisual    Each patient to whom he or she provides medical services by telemedicine is:  (1) informed of the relationship between the physician and patient and the respective role of any other health care provider with respect to management of the patient; and (2) notified that he or she may decline to receive medical services by telemedicine and may withdraw from such care at any time.    PRESURGICAL PSYCHOLOGICAL EVALUATION - BARIATRICS  Psychiatry Initial Visit (PhD/PsyD)   Psychological Intake and Assessment    Site: [facility], Department of Psychiatry, Psychology Section  [address] [place] [address]    CPT Codes:   01339 (1 hour): Psychiatric Diagnostic Evaluation  82693 (1 hour), 99119 (1 hour): Integration of patient data, interpretation of standardized test results and clinical data, clinical decision-making, treatment planning and report, and interactive feedback to the patient  65945 (1 hour), Psychological or neuropsychological test administration, with single automated instrument via electronic platform, with automated result only    NAME: Melissa Carvalho  MRN: [id number]  : 1985     Date: 2024  Evaluation Length (direct face-to-face time): 45 minutes  Total Time including report writing, test scoring, chart review, integration of data and feedback: 120 minutes    Clinical status of patient: Outpatient  Met with: Patient  Referred by: Kiki Villareal NP; Laurie Hector M.D.    Chief complaint/reason for encounter: Routine psychological evaluation prior to bariatric surgery.     Before this evaluation was initiated, the purposes and process of the assessment and the limits of confidentiality were discussed with the patient who expressed understanding of these issues and verbally consented to proceed with the  evaluation.     Type of surgery sought: Ms. Carvalho reported that she is seeking the sleeve gastrectomy procedure, though medical records from her bariatric dietician indicate that she is seeking the RNY Bypass procedure.       HISTORY OF PRESENT ILLNESS   Ms. Carvalho is a 39-year-old Black female who is pursuing bariatric surgery to improve her health and quality of life. She has no history of significant psychological difficulties. She has never taken psychotropic medication, has never been hospitalized for psychiatric reasons and denied any history of suicidality.  She has begun making positive lifestyle changes in anticipation for surgery, with good benefit. Ms. Carvalho has a Body Mass Index of 43.9 as documented by her bariatric dietician.     Ms. Carvalho has struggled with weight since her early 20s. Factors that have contributed to her weight gain over the years include PCOS, unhealthy eating habits, and eating patterns influenced by her work schedule (such as eating one large meal late at night rather than several smaller meals throughout the day). She denied a history of emotional eating. She has tried many weight loss methods on her own (i.e., diet pills, weight loss injections) with little success, and she believes that her biggest weight loss challenge is her work schedule and limited time to consume regular meals due to her schedule. Her motivation for seeking surgery now is to improve her physical health. Ms. Carvalho reported that she experiences PCOS, diabetes, high cholesterol, and blood pressure. Her postsurgical goals include weight loss, and to address my health issues and to live better and healthier.     Ms. Carvalho has met with Ms. Syl Hope RD, bariatric dietician, and she reports that has made the following lifestyle changes since beginning the bariatric program: exercising more regularly, consuming more liquids, and pacing her meals and food consumption. Ms. Carvalho has been  cleared by Ms. Hope to proceed with bariatric surgery.    Knowledge of Surgery Information:  Basics of procedure: Ms. Carvalho reported that she is seeking the sleeve gastrectomy procedure. She reported understanding of the basics of the procedure. Notably, medical records from her bariatric dietician indicate that she is seeking the RNY bypass procedure.   Risks: Ms. Carvalho reported understanding of the risks associated with the bariatric procedure.   Basics of diet: Ms. Carvalho reported understanding of the liquid diet required before and following the bariatric procedure.     MEDICAL HISTORY  Co-morbidities:   Patient Active Problem List    Diagnosis Date Noted    PCOS (polycystic ovarian syndrome) 11/27/2024    Class 3 severe obesity due to excess calories with serious comorbidity and body mass index (BMI) of 40.0 to 44.9 in adult 08/27/2024    Hypertension associated with type 2 diabetes mellitus 08/27/2024    Hyperlipidemia associated with type 2 diabetes mellitus 08/27/2024    Type 2 diabetes mellitus without complication, without long-term current use of insulin 08/27/2024    CIPRIANO on CPAP 08/27/2024    Chronic systolic heart failure 08/06/2024    History of congenital cardiac septal defect 10/21/2023      Past Medical History:   Diagnosis Date    Diabetes mellitus     Heart murmur     Hypertension     Miscarriage     x 2 at 4 weeks GA      Pain: Denied.     Current Health Behaviors:  Compliant with medical regimens and appointments: Yes  Prescription medication misuse: No  Exercise: Yes; Ms. Carvalho reported that she exercises a few times per week. She also noted that she walks regularly throughout the day while working.   Adequate cognitive functioning: Yes    Current and Past Substance Use:   Alcohol: Ms. Carvalho reported that she occasionally consumes alcohol on birthdays and holidays. She denied history of abuse or dependency.  Drugs: Denied current use; denied history of abuse or dependency.    Tobacco: Denied.   Caffeine: Ms. Carvalho reported that she occasionally drinks soda and seldomly drinks coffee.    Current Medications and Drug Reactions (include OTC, herbal): see medication list       PAST AND CURRENT MENTAL HEALTH  Current Psychiatric Symptoms:   Depression: Denied depressed mood, loss of interest in pleasurable activities, anhedonia, sleep changes, decreased motivation, decreased concentration, feelings of excessive or irrational guilt, helplessness, hopelessness, increased or decreased appetite, weight changes, increased or decreased motor activity, decreased energy, or suicidal ideations/thoughts of death.  Briana/Hypomania: Denied increased goal directed activity, decreased need for sleep, pressured speech or increased talkativeness, racing thoughts, increased risk-taking behavior, episodic elevated or irritable mood, flight of ideas, distractibility, inflated self-esteem, or grandiosity.  Anxiety: Denied excessive worry, difficulty controlling worrying, feeling keyed up, feeling easily fatigued, difficulty concentrating or mind going blank, irritability, muscle tension, sleep disturbance, racing thoughts, being unable to relax, or specific phobia.  Panic Attacks: Denied panic attacks characterized by heart palpitations, sweating, trembling, dyspnea, choking sensation, chest pain/discomfort, nausea, dizziness, chills or hot flashes, tingling, derealization, fear of losing control, or fear of dying.  Thoughts: Denied any AVH, paranoia, delusions, ideas of reference, thought insertion or thought broadcasting.  Suicidal thoughts/behaviors: Denied passive or active SI, denied suicidal plans or intent.  Self-injury: Denied.  Substance abuse: Denied abuse or dependence.   Sleep: Denied increased sleep latency, frequent nighttime awakenings, or early morning awakening with inability to return to sleep.  PTSD: Denied.     Current Psychiatric Treatment:  Medications: Denied.  Psychotherapy:  Denied.    Current Stress Management:   Current psychosocial stressors: Ms. Carvalho reported that the death of her brother two years ago serves as her primary source of stress.  Report of coping skills/recreational activities: Ms. Carvalho gerry by praying, exercising, cleaning her home, and cooking for her family. She enjoys spending time with her family, dancing, and listening to music.   Support system: Ms. Carvalho reported that her fiancé, older brother, other family members, and friends serve as sources of support.    Past Psychiatric History:   Previous diagnosis: Denied.   Previous psychiatric hospitalizations/inpatient treatment: Denied.  History of outpatient treatment: Ms. Carvalho reported that she participated in crisis counseling in high school following [place]. She denied further outpatient care.  Previous suicide attempt: Denied.  Non-suicidal self-injury: Denied.    Trauma History: Ms. Carvalho reported that she experienced [place] while in high school. Ms. Carvalho described the deaths of her brother and parents as traumatic for her. She also reported that her 16-year-old cousin was killed two weeks ago.     History of Eating Disorders:  History of bulimia: Denied recurrent episodes of eating then engaging in inappropriate compensatory behaviors.      History of binge-eating episodes: Denied episodes of binge eating characterized by eating excessive amounts of food within a discrete time period with a lack of control during eating, eating more rapidly than normal, eating until uncomfortably full, eating large amounts of food when not physically hungry, eating alone due to embarrassment, or negative emotions (i.e., disgusted, guilty, depressed) after eating.    Family History of Psychiatric Illness: Ms. Carvalho reported that her brother who passed away experienced substance abuse. She denied further history of familial mental health concerns.     Psychosocial History and  Current Social Situation:    Ms. Carvalho was born and raised in [place], [place], by her biological mother and father along with two older brothers. She described her childhood as fantastic. She denied childhood trauma, abuse, and neglect. She performed well in school and earned a high school diploma. She denied being enrolled in special education or being held back. She denied significant detentions, suspensions, and expulsions. She is currently employed full time as a . She denied  service. She is not on disability, and finances are stable. She is engaged to her fiancé with whom she has been in a relationship for 11 years. She has no children, and her fiancé has one teenage child. She currently lives with her fiancé. Spiritism is important to her as a source of support, and she identifies as Congregation.  Legal history: She denied history of arrests and convictions. She denied current involvement in litigation.    PSYCHOLOGICAL ASSESSMENT/TESTING:   All tests were administered according to standardized procedures and were selected based on the reason for referral. Effort on all tests was satisfactory to produce valid results.  MMPI-3. The MMPI-3 provides an assessment of personality and psychopathology with specific evaluation of psychosocial risk factors associated with outcomes of bariatric surgery. Ms. Carvalho produced a valid and interpretable MMPI-3 profile, answering items relevantly based on content. Therefore, her responses should be considered to be a relatively accurate reflection of her current psychological functioning.   TEST RESULTS. Ms. Carvalho reported no clinically significant emotional, thought, behavioral, or interpersonal dysfunction. She reported significant self-importance and described herself as having special abilities and ideas. She also reported having strong opinions, leadership skills, and assertiveness. When asked about these results, Ms. Carvalho  reported a healthy self-esteem and self-confidence. She also noted that she takes on the role of hosting family functions and supporting others. Ms. Carvalho's test results also indicated significant neurological concerns, including dizziness, difficulties with balance, and muscle spasms. When asked about these concerns. Ms. Carvalho denied experiencing these symptoms and noted that she may not have read these questions correctly.    GROUP COMPARISONS. Compared to other bariatric surgery candidates, Ms. Carvalho reported lower levels of introversion and low positive emotions. She reported greater levels of neurological concerns, self-importance, dominance, and aggressiveness. Only bolded items are clinically significant and discussed in the Test Results section above.      FEEDBACK. Ms. Carvalho was provided with test results and offered the opportunity to respond to feedback and clarify results if needed.    MENTAL STATUS EXAM:  General Appearance:  age appropriate, well dressed, neatly groomed    Speech:  normal tone, normal rate, normal pitch, normal volume    Level of Cooperation:  cooperative    Thought Processes:  normal and logical    Mood:  euthymic    Thought Content:  normal, no suicidality, no homicidality, delusions, or paranoia    Affect:  congruent and appropriate    Orientation:  oriented x3    Memory:  recent memory intact; remote memory intact; able to recall remote personal events   Attention Span & Concentration:  appropriate   Fund of General Knowledge:  appropriate for education    Abstract Reasoning:  Not directly assessed   Judgment & Insight:  good    Language  intact        SUMMARY:  Ms. Carvalho is a 39-year-old female referred for presurgical psychological evaluation prior to bariatric surgery.  There are no indications of disabling psychopathology, substance use/abuse, cognitive problems, or disabilities that would prevent understanding and competence with medical treatment. There are no  reports of major psychosocial stressors that would interfere with her engagement in treatment. There is no evidence of suicidality.  She exhibits high social stability and excellent social support. She has good coping strategies to deal with stress and the demands of surgery and recovery.  She has fair knowledge about bariatric surgery, appropriate expectations for surgery and recovery, adequate understanding of possible risks of this treatment option, and a moderate willingness to sustain effort for lifestyle changes and health adaptations required. She reports adequate compliance with prior medical regimens. Notably, Ms. Carvalho reported that she is seeking the sleeve gastrectomy procedure while previous medical records from her bariatric dietician indicate that she is seeking the RNY bypass procedure.   Results from psychological assessment/testing revealed minimal risks.       IMPRESSIONS AND RECOMMENDATIONS:  Ms. Carvalho is a suitable candidate from a psychological perspective. There are no absolute psychological contraindications to bariatric surgery. Overall, Ms. Carvalho is at a LOW risk for adverse postsurgical outcomes.    Ms. Carvalho currently reports no psychiatric problems, major psychosocial stressors, or other problems that would contraindicate surgery or impact her ability to engage in treatment. She has adequate and appropriate knowledge and expectations, and she is motivated and willing to engage in behaviors to achieve successful outcomes with bariatric surgery. Notably, Ms. Carvalho reported that she is seeking the sleeve gastrectomy procedure, while previous medical records from her bariatric dietician indicate that she is seeking the RNY bypass procedure. It may be helpful to clarify with Ms. Carvalho which surgery she will be pursuing to ensure that she has appropriate knowledge of this procedure and the associated risks. She has multiple protective factors that should help her during  surgery and recovery.     There are no recommendations for psychological intervention at this time.      Diagnostic impressions:    ICD-10-CM ICD-9-CM   1. Encounter for pre-surgical psychological assessment  Z00.8 V70.2   2. Morbid obesity  E66.01 278.01   3. Pre-op testing  Z01.818 V72.84        Plan: This report will be sent to the referring provider with impressions and recommendations. It will be the referring team's decision whether the patient proceeds with surgery. Services related to the presurgical psychological evaluation are now concluded.      Valerie Okeefe, Ph.D.

## 2024-12-30 NOTE — LETTER
December 30, 2024        Kiki Villareal, NP  [address]  [facility] [address]             [facility] [address]  [address]  [facility] [address]  Phone: [phone]   Patient: Melissa Carvalho   MR Number: [id number]   YOB: 1985   Date of Visit: 12/30/2024       Dear Dr. Villareal and Dr. Hector:    Thank you for referring Melissa Carvalho to me for evaluation. Below are the relevant portions of my assessment and plan of care.    Ms. Carvalho is a suitable candidate from a psychological perspective. There are no absolute psychological contraindications to bariatric surgery. Overall, Ms. Carvalho is at a LOW risk for adverse postsurgical outcomes.    Ms. Carvalho currently reports no psychiatric problems, major psychosocial stressors, or other problems that would contraindicate surgery or impact her ability to engage in treatment. She has adequate and appropriate knowledge and expectations, and she is motivated and willing to engage in behaviors to achieve successful outcomes with bariatric surgery. Notably, Ms. Carvalho reported that she is seeking the sleeve gastrectomy procedure, while previous medical records from her bariatric dietician indicate that she is seeking the RNY bypass procedure. It may be helpful to clarify with Ms. Carvalho which surgery she will be pursuing to ensure that she has appropriate knowledge of this procedure and the associated risks. She has multiple protective factors that should help her during surgery and recovery. There are no recommendations for psychological intervention at this time.       If you have questions, please do not hesitate to contact me.     Sincerely,    Valerie Okeefe, Ph.D.  Clinical Psychologist

## 2024-12-31 ENCOUNTER — TELEPHONE (OUTPATIENT)
Dept: BARIATRICS | Facility: CLINIC | Age: 39
End: 2024-12-31
Payer: COMMERCIAL

## 2025-01-02 ENCOUNTER — TELEPHONE (OUTPATIENT)
Dept: BARIATRICS | Facility: CLINIC | Age: 40
End: 2025-01-02
Payer: COMMERCIAL

## 2025-01-02 DIAGNOSIS — G47.33 OSA ON CPAP: ICD-10-CM

## 2025-01-02 DIAGNOSIS — E66.01 MORBID OBESITY: Primary | ICD-10-CM

## 2025-01-02 DIAGNOSIS — I15.2 HYPERTENSION ASSOCIATED WITH TYPE 2 DIABETES MELLITUS: ICD-10-CM

## 2025-01-02 DIAGNOSIS — E11.9 TYPE 2 DIABETES MELLITUS WITHOUT COMPLICATION, WITHOUT LONG-TERM CURRENT USE OF INSULIN: ICD-10-CM

## 2025-01-02 DIAGNOSIS — E11.59 HYPERTENSION ASSOCIATED WITH TYPE 2 DIABETES MELLITUS: ICD-10-CM

## 2025-01-02 DIAGNOSIS — E78.5 HYPERLIPIDEMIA ASSOCIATED WITH TYPE 2 DIABETES MELLITUS: ICD-10-CM

## 2025-01-02 DIAGNOSIS — E11.69 HYPERLIPIDEMIA ASSOCIATED WITH TYPE 2 DIABETES MELLITUS: ICD-10-CM

## 2025-01-02 NOTE — TELEPHONE ENCOUNTER
----- Message from Cindy sent at 1/2/2025  4:23 PM CST -----  Regarding: pt advice  Contact: [phone]  ..Type:  Patient Returning Call    Who Called:pt   Who Left Message for Patient:Leia Mathew  Does the patient know what this is regarding?:didn't say  Would the patient rather a call back or a response via [facility]? Call back   Best Call Back Number:[phone]  Additional Information: n/a

## 2025-01-02 NOTE — TELEPHONE ENCOUNTER
----- Message from Anneliese sent at 12/31/2024  1:07 PM CST -----  Name of Who is Calling:MICHAEL CAMPBELL [[id number]]        What is the request in detail:Pt requesting a call back from office because she missed 3 calls today.        Can the clinic reply by ThanxNER:Call        What Number to Call Back if not in [facility]:Telephone Information:  [facility]          [phone]

## 2025-01-02 NOTE — TELEPHONE ENCOUNTER
"Spoke with patient and confirmed the surgical procedure of Robotic Bypass with Dr Hector on 2/07/25.  Scheduled preop appts/surgery date/2 week and 8 week post op appts. All dates and times agreed upon. Pt aware that if they are required to have PCP clearance, it must be within 6 months of surgery, unless their medical history has changed, it should be dated, signed and in chart for preop appointment. All medications have been reviewed regarding the necessity to be crushed or broken into pieces smaller that the tip of a pencil eraser for 2 weeks following gastric sleeve surgery and 4 weeks following gastric bypass surgery. Pt instructed to stop taking all NSAIDS 1 week before surgery and for life after surgery. Pt aware that protein liquid diet start date is 1/24/25. Patient is doing well with their diet. Patient was instructed about the progression of the diet phases. The patient's current weight is 227 lbs, height is 5'1", and BMI is 42.89. Refer to medical letter of necessity from Surgeon. Discussed the importance of increased physical activity and dieting lifestyle changes to improve weight loss and meet goals. Screened patient for history of UTI per protocol. Discussed with patient to avoid antibiotics and elective procedures involving sedation/anesthesia within 30 days of surgery unless cleared by the bariatric department. Patient instructed to call the bariatric clinic post op for any s/s of UTI. Patient's mailing address confirmed and informed to expect a manilla envelop containing bariatric surgery pre op booklet, appointment reminders, protein and fluid log sheets, and liquid diet and vitamin information sheets. Pt aware that all appts can be seen in my [facility] patient portal at this time. Confirmed email address and informed patient that they will be enrolled in the Patient Reported Outcomes program to track their progress and successes. The first email will be sent 2-3 weeks before surgery and then every " year on your surgery anniversary date. Office phone and fax number given to patient for any future questions/concerns. Discussed the pre-surgery complex carbohydrate beverage to purchase in [facility] to drink 30 minutes before the surgery arrival time. Reviewed the policy of scheduling a covid test 72 hours prior to surgery if necessary.

## 2025-01-02 NOTE — LETTER
"  [facility] - B[facility] 2nd Fl  [address]  [facility] [address]  Phone: [phone]  Fax: [phone] 2025       Attn: Pre-determination Dept.    RE:  Melissa Carvalho          OC #: [id number]          : 1985    To Whom It May Concern:  I am sending this letter on behalf of Melissa Carvalho (a 39 y.o. female) for pre-approval for Bariatric Surgery; specifically the XI Robotic Gastric Bypass. Melissa has a past medical history of Morbid Obesity, Diabetes Mellitus Type II, Hypertension, Hyperlipidemia, and Obstructive Sleep Apnea. She has made numerous attempts at dieting and exercise programs, and has failed to maintain any sustained weight loss.  Weight/Height/BMI  Estimated body mass index is 42.89 kg/m² as calculated from the following:    Height as of 24: 5' 1" (1.549 m).    Weight as of 24: 103 kg (227 lb).   Melissa Carvalho has been evaluated in our bariatric program by myself, the , and the program dietician and is felt to be an excellent candidate for surgery.  In addition, she has undergone a psychological evaluation, from which a letter is enclosed.  Melissa Carvalho has undergone extensive pre-operative education and understands all the risks, benefits, and possible complications of surgery.  She has also undergone dietary education and thorough nutritional evaluation via a registered dietician.  Our program provides long term nutritional counseling with unlimited consults with the dietician.    Our team is sending this letter to receive pre-approval for the indicated procedure.  Please let us know if you have any questions or require any further information.  Sincerely,      Laurie Hector MD FACS  Minimally Invasive General & Bariatric Surgery  [facility] - [place], [place]              "

## 2025-01-06 ENCOUNTER — PATIENT MESSAGE (OUTPATIENT)
Dept: BARIATRICS | Facility: CLINIC | Age: 40
End: 2025-01-06
Payer: COMMERCIAL

## 2025-01-09 ENCOUNTER — TELEPHONE (OUTPATIENT)
Dept: BARIATRICS | Facility: CLINIC | Age: 40
End: 2025-01-09
Payer: COMMERCIAL

## 2025-01-09 NOTE — TELEPHONE ENCOUNTER
Called and spoke with the pt.  Discussed process of Disability paperwork and determining time off post surgery.  Followed up with a portal message recapping the information.  She requested the finance contact number to discuss paying for her surgery.

## 2025-01-09 NOTE — TELEPHONE ENCOUNTER
----- Message from ALEKSANDRA Vera sent at 1/9/2025  4:32 PM CST -----  Regarding: FW: FMLA Paperwork  Contact: Angel @ [phone]  Deni Lassiter Patient,  Thanks, Jody  ----- Message -----  From: Ayde Barrowaddie  Sent: 1/9/2025   4:02 PM CST  To: [facility] Staff  Subject: FMLA Paperwork                                   Caller Angel Mares (pt's fiance)  is calling to speak to someone in the office to discuss FMLA forms that were submitted to the office. Caller states that they have not heard back from anyone in the office. Pt is asking for a return call back to advise. Thanks.

## 2025-01-13 ENCOUNTER — OFFICE VISIT (OUTPATIENT)
Dept: BARIATRICS | Facility: CLINIC | Age: 40
End: 2025-01-13
Payer: COMMERCIAL

## 2025-01-13 ENCOUNTER — TELEPHONE (OUTPATIENT)
Dept: BARIATRICS | Facility: CLINIC | Age: 40
End: 2025-01-13
Payer: COMMERCIAL

## 2025-01-13 VITALS
SYSTOLIC BLOOD PRESSURE: 117 MMHG | DIASTOLIC BLOOD PRESSURE: 55 MMHG | TEMPERATURE: 98 F | WEIGHT: 222.88 LBS | HEART RATE: 78 BPM | BODY MASS INDEX: 42.11 KG/M2 | OXYGEN SATURATION: 98 %

## 2025-01-13 DIAGNOSIS — E66.01 CLASS 3 SEVERE OBESITY DUE TO EXCESS CALORIES WITH SERIOUS COMORBIDITY AND BODY MASS INDEX (BMI) OF 40.0 TO 44.9 IN ADULT: Primary | ICD-10-CM

## 2025-01-13 DIAGNOSIS — E11.9 TYPE 2 DIABETES MELLITUS WITHOUT COMPLICATION, WITHOUT LONG-TERM CURRENT USE OF INSULIN: ICD-10-CM

## 2025-01-13 DIAGNOSIS — Z98.84 S/P BARIATRIC SURGERY: ICD-10-CM

## 2025-01-13 DIAGNOSIS — E11.69 HYPERLIPIDEMIA ASSOCIATED WITH TYPE 2 DIABETES MELLITUS: ICD-10-CM

## 2025-01-13 DIAGNOSIS — E66.813 CLASS 3 SEVERE OBESITY DUE TO EXCESS CALORIES WITH SERIOUS COMORBIDITY AND BODY MASS INDEX (BMI) OF 40.0 TO 44.9 IN ADULT: Primary | ICD-10-CM

## 2025-01-13 DIAGNOSIS — E11.59 HYPERTENSION ASSOCIATED WITH TYPE 2 DIABETES MELLITUS: ICD-10-CM

## 2025-01-13 DIAGNOSIS — E78.5 HYPERLIPIDEMIA ASSOCIATED WITH TYPE 2 DIABETES MELLITUS: ICD-10-CM

## 2025-01-13 DIAGNOSIS — I15.2 HYPERTENSION ASSOCIATED WITH TYPE 2 DIABETES MELLITUS: ICD-10-CM

## 2025-01-13 PROCEDURE — 1159F MED LIST DOCD IN RCRD: CPT | Mod: CPTII,S$GLB,, | Performed by: SURGERY

## 2025-01-13 PROCEDURE — 99999 PR PBB SHADOW E&M-EST. PATIENT-LVL IV: CPT | Mod: PBBFAC,,, | Performed by: SURGERY

## 2025-01-13 PROCEDURE — 99215 OFFICE O/P EST HI 40 MIN: CPT | Mod: S$GLB,,, | Performed by: SURGERY

## 2025-01-13 PROCEDURE — 3074F SYST BP LT 130 MM HG: CPT | Mod: CPTII,S$GLB,, | Performed by: SURGERY

## 2025-01-13 PROCEDURE — 3078F DIAST BP <80 MM HG: CPT | Mod: CPTII,S$GLB,, | Performed by: SURGERY

## 2025-01-13 PROCEDURE — 3008F BODY MASS INDEX DOCD: CPT | Mod: CPTII,S$GLB,, | Performed by: SURGERY

## 2025-01-13 RX ORDER — PANTOPRAZOLE SODIUM 40 MG/10ML
40 INJECTION, POWDER, LYOPHILIZED, FOR SOLUTION INTRAVENOUS 2 TIMES DAILY
OUTPATIENT
Start: 2025-01-13

## 2025-01-13 RX ORDER — KETOROLAC TROMETHAMINE 15 MG/ML
15 INJECTION, SOLUTION INTRAMUSCULAR; INTRAVENOUS ONCE
OUTPATIENT
Start: 2025-01-13 | End: 2025-01-13

## 2025-01-13 RX ORDER — POLYETHYLENE GLYCOL 3350 17 G/17G
17 POWDER, FOR SOLUTION ORAL DAILY
Qty: 510 G | Refills: 0 | Status: SHIPPED | OUTPATIENT
Start: 2025-01-13 | End: 2025-03-05

## 2025-01-13 RX ORDER — MUPIROCIN 20 MG/G
OINTMENT TOPICAL
OUTPATIENT
Start: 2025-01-13

## 2025-01-13 RX ORDER — ACETAMINOPHEN 650 MG/20.3ML
1000 LIQUID ORAL EVERY 8 HOURS
OUTPATIENT
Start: 2025-01-13

## 2025-01-13 RX ORDER — ACETAMINOPHEN 650 MG/20.3ML
500 LIQUID ORAL
OUTPATIENT
Start: 2025-01-13

## 2025-01-13 RX ORDER — ACETAMINOPHEN 10 MG/ML
1000 INJECTION, SOLUTION INTRAVENOUS ONCE
OUTPATIENT
Start: 2025-01-13 | End: 2025-01-13

## 2025-01-13 RX ORDER — LIDOCAINE HYDROCHLORIDE 10 MG/ML
1 INJECTION, SOLUTION EPIDURAL; INFILTRATION; INTRACAUDAL; PERINEURAL ONCE
OUTPATIENT
Start: 2025-01-13 | End: 2025-01-13

## 2025-01-13 RX ORDER — HEPARIN SODIUM 5000 [USP'U]/ML
5000 INJECTION, SOLUTION INTRAVENOUS; SUBCUTANEOUS ONCE
OUTPATIENT
Start: 2025-01-13 | End: 2025-01-13

## 2025-01-13 RX ORDER — ONDANSETRON HYDROCHLORIDE 2 MG/ML
8 INJECTION, SOLUTION INTRAVENOUS EVERY 6 HOURS
OUTPATIENT
Start: 2025-01-13

## 2025-01-13 RX ORDER — FAMOTIDINE 10 MG/ML
20 INJECTION INTRAVENOUS ONCE
OUTPATIENT
Start: 2025-01-13 | End: 2025-01-13

## 2025-01-13 RX ORDER — PROCHLORPERAZINE EDISYLATE 5 MG/ML
5 INJECTION INTRAMUSCULAR; INTRAVENOUS EVERY 6 HOURS PRN
OUTPATIENT
Start: 2025-01-13

## 2025-01-13 RX ORDER — SODIUM CHLORIDE 9 MG/ML
INJECTION, SOLUTION INTRAVENOUS CONTINUOUS
OUTPATIENT
Start: 2025-01-13

## 2025-01-13 RX ORDER — ENOXAPARIN SODIUM 100 MG/ML
40 INJECTION SUBCUTANEOUS EVERY 12 HOURS
OUTPATIENT
Start: 2025-01-13

## 2025-01-13 RX ORDER — SODIUM CHLORIDE, SODIUM LACTATE, POTASSIUM CHLORIDE, CALCIUM CHLORIDE 600; 310; 30; 20 MG/100ML; MG/100ML; MG/100ML; MG/100ML
INJECTION, SOLUTION INTRAVENOUS CONTINUOUS
OUTPATIENT
Start: 2025-01-13

## 2025-01-13 RX ORDER — GABAPENTIN 300 MG/1
CAPSULE ORAL
Qty: 11 CAPSULE | Refills: 0 | Status: SHIPPED | OUTPATIENT
Start: 2025-01-13

## 2025-01-13 RX ORDER — URSODIOL 500 MG/1
500 TABLET, FILM COATED ORAL DAILY
Qty: 180 TABLET | Refills: 0 | Status: SHIPPED | OUTPATIENT
Start: 2025-01-13 | End: 2025-07-12

## 2025-01-13 RX ORDER — OXYCODONE HCL 5 MG/5 ML
5 SOLUTION, ORAL ORAL EVERY 6 HOURS PRN
OUTPATIENT
Start: 2025-01-13

## 2025-01-13 RX ORDER — METRONIDAZOLE 500 MG/100ML
500 INJECTION, SOLUTION INTRAVENOUS
OUTPATIENT
Start: 2025-01-13

## 2025-01-13 RX ORDER — PROMETHAZINE HYDROCHLORIDE 12.5 MG/1
12.5 SUPPOSITORY RECTAL EVERY 6 HOURS PRN
Qty: 5 SUPPOSITORY | Refills: 0 | Status: SHIPPED | OUTPATIENT
Start: 2025-01-13

## 2025-01-13 RX ORDER — GABAPENTIN 250 MG/5ML
300 SOLUTION ORAL 2 TIMES DAILY
OUTPATIENT
Start: 2025-01-13

## 2025-01-13 RX ORDER — OMEPRAZOLE 40 MG/1
40 CAPSULE, DELAYED RELEASE ORAL EVERY MORNING
Qty: 30 CAPSULE | Refills: 2 | Status: SHIPPED | OUTPATIENT
Start: 2025-01-13

## 2025-01-13 RX ORDER — DEXTROMETHORPHAN/PSEUDOEPHED 2.5-7.5/.8
40 DROPS ORAL 4 TIMES DAILY PRN
OUTPATIENT
Start: 2025-01-13

## 2025-01-13 RX ORDER — ONDANSETRON 8 MG/1
8 TABLET, ORALLY DISINTEGRATING ORAL EVERY 6 HOURS PRN
Qty: 30 TABLET | Refills: 0 | Status: SHIPPED | OUTPATIENT
Start: 2025-01-13

## 2025-01-13 RX ORDER — SCOLOPAMINE TRANSDERMAL SYSTEM 1 MG/1
1 PATCH, EXTENDED RELEASE TRANSDERMAL ONCE
OUTPATIENT
Start: 2025-01-13 | End: 2025-01-13

## 2025-01-13 NOTE — PROGRESS NOTES
Subjective:  The patient is a 39 y.o. obese female who presents for pre op for Robotic-assisted Laparoscopic Rios-en-Y Gastric Bypass.    DM type 2 - on tirzepatide. 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 Most recent ECHO reviewed; stable EF at 35%.   HLD - on lipitor  Has a CPAP.   PCOS - two prior miscarriages. Plans for future pregnancy but not for 18mos post-op.   No significant heartburn / reflux. No meds.  No prior abdominal surgeries.     All workup has been reviewed in clinic today and there is nothing on the review that would prevent us from proceeding with surgery.    All questions were answered in clinic today prior to leaving.    Body mass index is 42.11 kg/m².       Patient Active Problem List    Diagnosis Date Noted    PCOS (polycystic ovarian syndrome) 11/27/2024    Class 3 severe obesity due to excess calories with serious comorbidity and body mass index (BMI) of 40.0 to 44.9 in adult 08/27/2024    Hypertension associated with type 2 diabetes mellitus 08/27/2024    Hyperlipidemia associated with type 2 diabetes mellitus 08/27/2024    Type 2 diabetes mellitus without complication, without long-term current use of insulin 08/27/2024    CIPRIANO on CPAP 08/27/2024    Chronic systolic heart failure 08/06/2024    History of congenital cardiac septal defect 10/21/2023     Past Medical History:   Diagnosis Date    Diabetes mellitus     Heart murmur     Hypertension     Miscarriage     x 2 at 4 weeks GA      Past Surgical History:   Procedure Laterality Date    CARDIAC SURGERY      at age 7    NOSE SURGERY Bilateral 07/22/2022    [facility] [place]      (Not in a hospital admission)    Review of patient's allergies indicates:  No Known Allergies   Social History     Tobacco Use    Smoking status: Never     Passive exposure: Never    Smokeless tobacco: Never   Substance Use Topics    Alcohol use: Not Currently      Comment: Occasionally      Family History   Problem Relation Name Age of Onset    Diabetes Mother      Hypertension Mother      Cancer Father      Drug abuse Brother      Breast cancer Neg Hx      Colon cancer Neg Hx      Ovarian cancer Neg Hx          Review of Systems  Constitutional: negative for anorexia, chills and fatigue  Eyes: negative for icterus, irritation and redness  Respiratory: negative for cough and dyspnea on exertion  Cardiovascular: negative for chest pain and chest pressure/discomfort  Gastrointestinal: negative for abdominal pain, change in bowel habits, constipation and diarrhea  Musculoskeletal:negative for arthralgias and back pain  Neurological: negative for coordination problems and dizziness  Behavioral/Psych: negative for anxiety and bad mood    Objective:  Vital signs in last 24 hours:  Vitals:    01/13/25 1220   BP: (!) 117/55   BP Location: Left arm   Patient Position: Sitting   Pulse: 78   Temp: 98.2 °F (36.8 °C)   TempSrc: Oral   SpO2: 98%   Weight: 101.1 kg (222 lb 14.2 oz)          Weight History Current Weight Total Weight Loss   8/27/2024   8:39  lbs -236 lbs       General appearance: alert, appears stated age and cooperative  Head: Normocephalic, without obvious abnormality, atraumatic  Eyes: negative findings: lids and lashes normal and conjunctivae and sclerae normal  Lungs: non labored breathing  Heart: regular rate and rhythm  Abdomen: soft, non-tender  Extremities: extremities normal, atraumatic, no cyanosis or edema  Skin: Skin color, texture, turgor normal. No rashes or lesions  Neurologic: Grossly normal    Data Review:  Psych: Cleared  Nutrition: Cleared  PCP: Cleared  CXR: WNL  EKG: WNL  Labs: No abnormalities that would prevent proceeding with surgery.    Assessment/Plan:  Melissa was seen today for pre-op exam and follow-up.    Diagnoses and all orders for this visit:    Class 3 severe obesity due to excess calories with serious comorbidity and body mass index  (BMI) of 40.0 to 44.9 in adult  -     acetaminophen 500 mg PwPk; Take 1,000 mg by mouth 3 (three) times daily. Take 1,000mg (2 packets) THREE TIMES DAILY for at least 3 days and up to 5 days as needed for pain. May take an additional packet (500mg) once daily if required for pain. Do not take more than 4,000mg in 24 hours. for 5 days  -     omeprazole (PRILOSEC) 40 MG capsule; Take 1 capsule (40 mg total) by mouth every morning. Open capsule and take with apple sauce  -     polyethylene glycol (GLYCOLAX) 17 gram/dose powder; Take 17 g by mouth once daily.  -     ursodioL (ACTIGALL) 500 MG tablet; Take 1 tablet (500 mg total) by mouth once daily. For gallstone prevention.  -     ondansetron (ZOFRAN-ODT) 8 MG TbDL; Take 1 tablet (8 mg total) by mouth every 6 (six) hours as needed (Nausea).  -     promethazine (PHENERGAN) 12.5 MG Supp; Place 1 suppository (12.5 mg total) rectally every 6 (six) hours as needed (nausea, 2nd line).  -     gabapentin (NEURONTIN) 300 MG capsule; OPEN capsule into a tablespoon and consume with sip of liquid. Take once the MORNING OF SURGERY. After surgery: take one capsule TWICE DAILY for at least 3 days and up to 5 days as needed for pain.    Hypertension associated with type 2 diabetes mellitus  Continue multimodal HTN medications including betablockers.  Close PCP follow up post-op.    Type 2 diabetes mellitus without complication, without long-term current use of insulin  Will need to stop GLP-1 medications >7d prior to surgery.  Close PCP follow up post-op with blood glucose monitoring logs post-operatively.      Morbid obesity with failure of conservative therapy.    I have personally discussed, in appropriate language, the potential complications of a Robotic-assisted Laparoscopic Rios-en-Y Gastric Bypass with Melissa Carvalho. She is well aware of the serious complications of the surgery that include but are not limited to: bleeding (including possible need for transfusions), deep  venous thrombosis, pulmonary embolism, pulmonary complications such as pneumonia, cardiac events, temporary or permanent stroke, damage to the spleen, bowel or other organs during surgery and the risk of death. Melissa is also aware of specific complications which apply in particular to bariatric procedures and can include but are not limited to: severe post-operative nausea, dysphagia, the leakage of gastric/ intestinal contents at the staple line and the development of an intra-abdominal abscess requiring drainage, need for additional surgery, severe intra-abdominal infection leading to sepsis and even death, wound complications such as hernias and wound infections, strictures, small bowel obstruction, disfiguring scars and failure to lose weight. Further, once again, it was reinforced that her bariatric procedure is a weight loss tool but ultimate success will rest on lifelong dietary and lifestyle changes. Inadequate weight loss and weight regain are absolutely possible and therefore she will need to treat obesity as the chronic disease it is and continue to optimize her efforts.    We discussed that our goal is to ameliorate her medical problems and not to obtain a specific body mass index. She understands the risks and benefits and wishes to proceed with the procedure. She has signed a consent form.     I spent 45 minutes on this patient encounter. This includes times spent with the patient involving coordination of care and counseling, discussion of the patient's condition, delineating the plan of management above, discussing the rationale / recommendations for treatment, chart review and documentation. All time accounted for was spent on the day of service.      Laurie Hector MD FACS  Minimally Invasive General & Bariatric Surgery

## 2025-01-13 NOTE — H&P (VIEW-ONLY)
Subjective:  The patient is a 39 y.o. obese female who presents for pre op for Robotic-assisted Laparoscopic Rios-en-Y Gastric Bypass.    DM type 2 - on tirzepatide. 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 Most recent ECHO reviewed; stable EF at 35%.   HLD - on lipitor  Has a CPAP.   PCOS - two prior miscarriages. Plans for future pregnancy but not for 18mos post-op.   No significant heartburn / reflux. No meds.  No prior abdominal surgeries.     All workup has been reviewed in clinic today and there is nothing on the review that would prevent us from proceeding with surgery.    All questions were answered in clinic today prior to leaving.    Body mass index is 42.11 kg/m².       Patient Active Problem List    Diagnosis Date Noted    PCOS (polycystic ovarian syndrome) 11/27/2024    Class 3 severe obesity due to excess calories with serious comorbidity and body mass index (BMI) of 40.0 to 44.9 in adult 08/27/2024    Hypertension associated with type 2 diabetes mellitus 08/27/2024    Hyperlipidemia associated with type 2 diabetes mellitus 08/27/2024    Type 2 diabetes mellitus without complication, without long-term current use of insulin 08/27/2024    CIPRIANO on CPAP 08/27/2024    Chronic systolic heart failure 08/06/2024    History of congenital cardiac septal defect 10/21/2023     Past Medical History:   Diagnosis Date    Diabetes mellitus     Heart murmur     Hypertension     Miscarriage     x 2 at 4 weeks GA      Past Surgical History:   Procedure Laterality Date    CARDIAC SURGERY      at age 7    NOSE SURGERY Bilateral 07/22/2022    [facility] [place]      (Not in a hospital admission)    Review of patient's allergies indicates:  No Known Allergies   Social History     Tobacco Use    Smoking status: Never     Passive exposure: Never    Smokeless tobacco: Never   Substance Use Topics    Alcohol use: Not Currently      Comment: Occasionally      Family History   Problem Relation Name Age of Onset    Diabetes Mother      Hypertension Mother      Cancer Father      Drug abuse Brother      Breast cancer Neg Hx      Colon cancer Neg Hx      Ovarian cancer Neg Hx          Review of Systems  Constitutional: negative for anorexia, chills and fatigue  Eyes: negative for icterus, irritation and redness  Respiratory: negative for cough and dyspnea on exertion  Cardiovascular: negative for chest pain and chest pressure/discomfort  Gastrointestinal: negative for abdominal pain, change in bowel habits, constipation and diarrhea  Musculoskeletal:negative for arthralgias and back pain  Neurological: negative for coordination problems and dizziness  Behavioral/Psych: negative for anxiety and bad mood    Objective:  Vital signs in last 24 hours:  Vitals:    01/13/25 1220   BP: (!) 117/55   BP Location: Left arm   Patient Position: Sitting   Pulse: 78   Temp: 98.2 °F (36.8 °C)   TempSrc: Oral   SpO2: 98%   Weight: 101.1 kg (222 lb 14.2 oz)          Weight History Current Weight Total Weight Loss   8/27/2024   8:39  lbs -236 lbs       General appearance: alert, appears stated age and cooperative  Head: Normocephalic, without obvious abnormality, atraumatic  Eyes: negative findings: lids and lashes normal and conjunctivae and sclerae normal  Lungs: non labored breathing  Heart: regular rate and rhythm  Abdomen: soft, non-tender  Extremities: extremities normal, atraumatic, no cyanosis or edema  Skin: Skin color, texture, turgor normal. No rashes or lesions  Neurologic: Grossly normal    Data Review:  Psych: Cleared  Nutrition: Cleared  PCP: Cleared  CXR: WNL  EKG: WNL  Labs: No abnormalities that would prevent proceeding with surgery.    Assessment/Plan:  Melissa was seen today for pre-op exam and follow-up.    Diagnoses and all orders for this visit:    Class 3 severe obesity due to excess calories with serious comorbidity and body mass index  (BMI) of 40.0 to 44.9 in adult  -     acetaminophen 500 mg PwPk; Take 1,000 mg by mouth 3 (three) times daily. Take 1,000mg (2 packets) THREE TIMES DAILY for at least 3 days and up to 5 days as needed for pain. May take an additional packet (500mg) once daily if required for pain. Do not take more than 4,000mg in 24 hours. for 5 days  -     omeprazole (PRILOSEC) 40 MG capsule; Take 1 capsule (40 mg total) by mouth every morning. Open capsule and take with apple sauce  -     polyethylene glycol (GLYCOLAX) 17 gram/dose powder; Take 17 g by mouth once daily.  -     ursodioL (ACTIGALL) 500 MG tablet; Take 1 tablet (500 mg total) by mouth once daily. For gallstone prevention.  -     ondansetron (ZOFRAN-ODT) 8 MG TbDL; Take 1 tablet (8 mg total) by mouth every 6 (six) hours as needed (Nausea).  -     promethazine (PHENERGAN) 12.5 MG Supp; Place 1 suppository (12.5 mg total) rectally every 6 (six) hours as needed (nausea, 2nd line).  -     gabapentin (NEURONTIN) 300 MG capsule; OPEN capsule into a tablespoon and consume with sip of liquid. Take once the MORNING OF SURGERY. After surgery: take one capsule TWICE DAILY for at least 3 days and up to 5 days as needed for pain.    Hypertension associated with type 2 diabetes mellitus  Continue multimodal HTN medications including betablockers.  Close PCP follow up post-op.    Type 2 diabetes mellitus without complication, without long-term current use of insulin  Will need to stop GLP-1 medications >7d prior to surgery.  Close PCP follow up post-op with blood glucose monitoring logs post-operatively.      Morbid obesity with failure of conservative therapy.    I have personally discussed, in appropriate language, the potential complications of a Robotic-assisted Laparoscopic Rios-en-Y Gastric Bypass with Melissa Carvalho. She is well aware of the serious complications of the surgery that include but are not limited to: bleeding (including possible need for transfusions), deep  venous thrombosis, pulmonary embolism, pulmonary complications such as pneumonia, cardiac events, temporary or permanent stroke, damage to the spleen, bowel or other organs during surgery and the risk of death. Melissa is also aware of specific complications which apply in particular to bariatric procedures and can include but are not limited to: severe post-operative nausea, dysphagia, the leakage of gastric/ intestinal contents at the staple line and the development of an intra-abdominal abscess requiring drainage, need for additional surgery, severe intra-abdominal infection leading to sepsis and even death, wound complications such as hernias and wound infections, strictures, small bowel obstruction, disfiguring scars and failure to lose weight. Further, once again, it was reinforced that her bariatric procedure is a weight loss tool but ultimate success will rest on lifelong dietary and lifestyle changes. Inadequate weight loss and weight regain are absolutely possible and therefore she will need to treat obesity as the chronic disease it is and continue to optimize her efforts.    We discussed that our goal is to ameliorate her medical problems and not to obtain a specific body mass index. She understands the risks and benefits and wishes to proceed with the procedure. She has signed a consent form.     I spent 45 minutes on this patient encounter. This includes times spent with the patient involving coordination of care and counseling, discussion of the patient's condition, delineating the plan of management above, discussing the rationale / recommendations for treatment, chart review and documentation. All time accounted for was spent on the day of service.      Laurie Hector MD FACS  Minimally Invasive General & Bariatric Surgery

## 2025-01-13 NOTE — TELEPHONE ENCOUNTER
Spoke to patient to see if she could come in at 12:15 or 12:30 for her preop. She stated that she will try to get here for 12:15 but will definitely be able to make it by 12:30

## 2025-01-14 ENCOUNTER — PATIENT MESSAGE (OUTPATIENT)
Dept: BARIATRICS | Facility: CLINIC | Age: 40
End: 2025-01-14
Payer: COMMERCIAL

## 2025-01-24 ENCOUNTER — CLINICAL SUPPORT (OUTPATIENT)
Dept: BARIATRICS | Facility: CLINIC | Age: 40
End: 2025-01-24
Payer: COMMERCIAL

## 2025-01-24 DIAGNOSIS — E78.5 HYPERLIPIDEMIA, UNSPECIFIED HYPERLIPIDEMIA TYPE: ICD-10-CM

## 2025-01-24 DIAGNOSIS — I10 PRIMARY HYPERTENSION: ICD-10-CM

## 2025-01-24 DIAGNOSIS — G47.33 OSA ON CPAP: ICD-10-CM

## 2025-01-24 DIAGNOSIS — E66.01 MORBID OBESITY WITH BMI OF 40.0-44.9, ADULT: ICD-10-CM

## 2025-01-24 DIAGNOSIS — E11.9 TYPE 2 DIABETES MELLITUS WITHOUT COMPLICATION, WITHOUT LONG-TERM CURRENT USE OF INSULIN: ICD-10-CM

## 2025-01-24 DIAGNOSIS — Z71.3 DIETARY COUNSELING: Primary | ICD-10-CM

## 2025-01-24 NOTE — PROGRESS NOTES
Audio Only Telehealth Visit     The patient location is: [place]  The chief complaint leading to consultation is: Pre-op liquid diet and nutrition instructions  Visit type: Virtual visit with audio only (telephone)  Total time spent with patient: 15     The reason for the audio only service rather than synchronous audio and video virtual visit was related to technical difficulties or patient preference/necessity.     Each patient to whom I provide medical services by telemedicine is:  (1) informed of the relationship between the physician and patient and the respective role of any other health care provider with respect to management of the patient; and (2) notified that they may decline to receive medical services by telemedicine and may withdraw from such care at any time. Patient verbally consented to receive this service via voice-only telephone call.    This service was not originating from a related E/M service provided within the previous 7 days nor will  to an E/M service or procedure within the next 24 hours or my soonest available appointment.  Prevailing standard of care was able to be met in this audio-only visit.      NUTRITION NOTE    Bariatric Surgeon: Laurie Hector M.D.  Reason for MNT Referral: Pre-op liquid diet and nutrition instructions  Procedure: Rios-en-Y gastric bypass  Date of Surgery: 2/7/25    Pre-op liquid diet  Pt using Premier Protein, Muscle Milk (25 g pro) for preop liquid diet  Fluids include: Water, bone broth, protein water, Gatorade Zero, zero sugar pedialyte, SF Jell-O    Discussion:  -  gms of protein per day  - 600-800 calories per day   - No more than 4 g sugar per protein shake/water/powder  - Sugar-free, decaffeinated, non-carbonated fluids  - No fruits, juices, yogurt or pudding on liquid diet  - No herbal supplements including green tea and fish oils for 2 weeks prior to surgery   - No vitamins for 1 week prior to surgery  - Stop GLP-1 1 week prior to  surgery    Post-op instructions  - Reviewed nutritional guidelines for post-surgery.    - 1 ounce medicine cups and tracking sheet provided for patient to measure and track fluid intake after surgery.  - Start with 1 oz clear liquids every 15 minutes following surgery, and to increase as tolerated. Aim for 48 fl oz clear fluids daily (includes clear protein drinks and protein soups).  - May begin sipping on protein shakes, as long as maintaining 48 fl oz non-caffeinated clear liquids per day.  - Reviewed bariatric vitamin/mineral regimen, starts 1 week after surgery as tolerated.  - Reviewed common nutritional concerns and prevention tips after bariatric surgery.  - Reminded not to lift anything greater than 10 lbs for 6 week post-surgery   - Encouraged PT to walk as much as tolerable after surgery.     Pt will purchase the following vitamins and minerals to start taking one week after being discharged from hospital:    Liquid multivitamin with iron  Liquid Calcium Citrate with vitamin D    B-1 250 mg   B-12 sublingual 2500 mcg     Reviewed dosage and timing of vitamin/mineral guidelines.    Remind pt per nursing and medical team to inform our department if taking antibiotics within the 30 days post bariatric surgery or it any other surgeries/procedures are scheduled within 30 days after bariatric surgery.    Pt verbalized understanding of information provided with appropriate questions and comments.     SESSION TIME: 15 minutes

## 2025-01-27 ENCOUNTER — TELEPHONE (OUTPATIENT)
Dept: PODIATRY | Facility: CLINIC | Age: 40
End: 2025-01-27
Payer: COMMERCIAL

## 2025-01-27 NOTE — TELEPHONE ENCOUNTER
Spoke with patient in regards to an appointment request, date and time has been scheduled and verbally confirmed by patient            Appointment Request   Melissa K. Deven   Sent:  12:04 PM   To: [facility] Pod[facility] Clinical Support Staff      Melissa Carvalho   MRN: [id number] : 1985   Pt Home: [phone]     Entered: [phone]        Message    Appointment Request From: Melissa Carvalho      With Provider: Berta Burgos DPM [[facility] - Podiatry]      Preferred Date Range: 2025 - 2025      Preferred Times: Any Time      Reason for visit: Other.      Comments:   Getting foot checked and toe nails clip.

## 2025-02-05 NOTE — PRE-PROCEDURE INSTRUCTIONS
PreOp Instructions given:     -- Medication information (what to hold and what to take)   -- NPO guidelines as follows: (or as per your Surgeon)  Stop ALL solid food, gum, candy 8 hours before arrival time.  Stop all CLOUDY liquids: coffee with creamer, cloudy juices, 8 hours prior to arrival time.  The patient should be ENCOURAGED to drink carbohydrate-rich clear liquids (sports drinks, clear juices) until 2 hours prior to arrival time.  Stop clear liquids 2 hours prior to arrival time.  CLEAR liquids include water, black coffee NO creamer, clear oral rehydration drinks, clear sports drinks and clear fruit juices (no orange juice, no pulpy juices, no apple cider).   IF IN DOUBT, drink water instead.   NOTHING TO DRINK 2 hours before to surgery/procedure  time. If you are told to take medication on the morning of surgery, it may be taken with a sip of water.   -- *Arrival place and directions given*.  Time to be given the day before procedure by the Surgeon's Office   -- Bathe with antibacterial soap (dial or Hibiclens as instructed)  -- Don't wear any jewelry or valuables and not metals on skin or hair AM of surgery   -- No makeup or moisturizer to face   -- No perfume/cologne, powder, lotions, aftershave or deodorant     Pt's instructions given by surgeons office, verified with Pt that there were no further questions at this time. Pt verbalized understanding.           *If going to , see below:      Directions and Instructions for [facility]   At [facility], we have an outstanding team of physicians, anesthesiologists, CRNAs, Registered Nurses, Surgical Technologists, and other ancillary team members all focused on your surgical and procedural care.   Before Your Procedure:   The physician's office will call you with a specific arrival time and directions a day or two before your scheduled procedure. You may also receive these instructions through your [facility] portal.   Day  of Procedure:   Please be sure to arrive at the arrival time given or you may risk your surgery being delayed or canceled. The arrival time is earlier than your scheduled surgery or procedure time. In the winter months please dress warm and bring blankets for you or your child as the waiting room may be cold. If you have difficulty locating the facility, please give us a call at [phone].   Directions:   The [facility] is located on the 1st floor of the hospital building near the [place] entrance.   Parking:   You will park in the [facility] Parking Garage (note location on map). [facility] opens at 5:00 a.m. and has a drop off area by the entrance.  parking is available starting at 7:00 a.m. Please see below for further  parking instructions.   Directions from the parking garage elevators   Blue [facility] Elevators: From the parking garage, take the blue Adarsh Saenz elevators (located in the center of the parking garage) to the 1st floor of the garage. You will then take a right once off the elevators then another right to the outside of the parking garage. You will be across from the [facility]. You will walk down the sidewalk, pass the  curve at the [place] entrance and continue to follow the sidewalk. You will pass the radiation oncology entrance on your right. Continue to follow the sidewalk to the [facility] glass door entrance.   Hospital Entrance (Inside Route): If a mostly inside route is preferred: Take the inside elevator bank (located at the far north end of the garage) from the parking garage to the 1st floor. On the 1st floor walk past [employer]. Keep walking down the center of the hallway towards the hospital elevators. Once you reach the red brick leeann, take a left and go past the hospital elevators. Take another left and follow the blue and white Adarsh Saenz signs around the hallway to the end. Go outside of the door.  You will see the [facility] entrance to your right.   Drop Off:   There is a drop off area at the doors of the [facility] for your convenience. If utilized for pediatric patients, an adult must accompany the patient into the surgery center while another adult suarez the vehicle.    (at 7:00 a.m.):   Upon check-in, please let the  know that you are utilizing [employer] parking which is free. The . will then call [employer] for your car to be picked up. Your keys and phone number will be collected and given to [employer] services. You will then be given a ticket. Upon discharge, [employer] will be notified to bring your vehicle back when you are ready.           Directions to [facility]:  [phone]     From 1st floor garage elevators: go past [facility] [employer] shop. Look for Black piano on side of coffee shop. Take Atrium (gold) elevator by piano up to 2nd floor. When you exit elevator follow long hallway (you should see a sign hanging from the ceiling that says Day of Surgery Family Waiting Room. When hallway ends you will be entering the day of surgery waiting area. Check in at the desk for your procedure.      From [facility] entrance: Make a right after you enter the door to the hospital. On your Left, take [place] elevator to 2nd floor. Check in at desk      From Lab desk on 2nd floor: Exit lab area toward hospital atrium. You should see a sign that says Day of Surgery [facility] Waiting Area. Make a Left and follow long hallway (you should see a sign hanging from the ceiling that says Day of Surgery [facility] Waiting Room. When hallway ends you will be entering the day of surgery waiting area. Check in at the desk for your procedure.

## 2025-02-06 ENCOUNTER — TELEPHONE (OUTPATIENT)
Dept: BARIATRICS | Facility: CLINIC | Age: 40
End: 2025-02-06
Payer: COMMERCIAL

## 2025-02-06 ENCOUNTER — ANESTHESIA EVENT (OUTPATIENT)
Dept: SURGERY | Facility: HOSPITAL | Age: 40
DRG: 620 | End: 2025-02-06
Payer: COMMERCIAL

## 2025-02-06 NOTE — ANESTHESIA PREPROCEDURE EVALUATION
[facility]  Anesthesia Pre-Operative Evaluation         Patient Name: Melissa Carvalho  YOB: 1985  MRN: [id number]    SUBJECTIVE:     Pre-operative evaluation for Procedure(s) (LRB):  XI ROBOTIC GASTROENTEROSTOMY, MADI-EN-Y with intra op EGD (N/A)     02/06/2025    Melissa Carvalho is a 39 y.o. female w/ a significant PMHx of CIPRIANO, CHF (EF 30-35%), congenital cardiac defects s/p repair of septal defects and a valvular abnormality @ 6yo, HTN (carvedilol), DM2 (A1c 5.8) (mounjaro) .    Patient now presents for the above procedure(s).    TTE 7/2/24    Left Ventricle: The left ventricle is mildly to moderately dilated. Mildly to moderately increased wall thickness. Unable to assess wall motion. There is moderately reduced systolic function with a visually estimated ejection fraction of 30 - 35%.  Technically difficult estimation of LVEF.  Echocontrast could help for more definitive evaluation.  There is indeterminate diastolic function.    Mitral Valve: There is trace to mild regurgitation.    Right Ventricle: Normal right ventricular cavity size. Systolic function is normal.    Pericardium: There is a trivial effusion.    No evidence of intracardiac shunt on bubble study.      LDA: None documented.    Prev airway:     06/30/23; 1054 (created via procedure documentation); Direct laryngoscopy, Rapid sequence, Stylet; Oral Standard; 7 mm; Cuffed; Auscultation, Capnometry, Symmetrical chest wall movement; Romero; 1       Drips: None documented.    Patient Active Problem List   Diagnosis    Class 3 severe obesity due to excess calories with serious comorbidity and body mass index (BMI) of 40.0 to 44.9 in adult    Hypertension associated with type 2 diabetes mellitus    Hyperlipidemia associated with type 2 diabetes mellitus    Type 2 diabetes mellitus without complication, without long-term current use of insulin    CIPRIANO on CPAP    Chronic systolic heart failure    History of congenital  cardiac septal defect    PCOS (polycystic ovarian syndrome)       Review of patient's allergies indicates:  No Known Allergies    Current Outpatient Medications:  No current facility-administered medications for this encounter.    Current Outpatient Medications:     atorvastatin (LIPITOR) 40 MG tablet, Take 1 tablet (40 mg total) by mouth once daily., Disp: 90 tablet, Rfl: 3    carvediloL (COREG) 25 MG tablet, Take 1 tablet (25 mg total) by mouth 2 (two) times daily with meals., Disp: 180 tablet, Rfl: 3    ergocalciferol (ERGOCALCIFEROL) 50,000 unit Cap, Take 1 capsule (50,000 Units total) by mouth every 7 days. for 12 doses, Disp: 12 capsule, Rfl: 0    multivitamin (THERAGRAN) per tablet, Take 1 tablet by mouth once daily., Disp: , Rfl:     norethindrone (MICRONOR) 0.35 mg tablet, Take 1 tablet (0.35 mg total) by mouth once daily., Disp: 28 tablet, Rfl: 12    acetaminophen 500 mg PwPk, Take 1,000 mg by mouth 3 (three) times daily. Take 1,000mg (2 packets) THREE TIMES DAILY for at least 3 days and up to 5 days as needed for pain. May take an additional packet (500mg) once daily if required for pain. Do not take more than 4,000mg in 24 hours. for 5 days, Disp: 35 packet, Rfl: 0    ammonium lactate 12 % Crea, Apply 1 gramas directed topically Daily. (Patient not taking: Reported on 2/5/2025), Disp: 140 g, Rfl: 1    azelastine (ASTELIN) 137 mcg (0.1 %) nasal spray, 1 spray 2 (two) times daily. (Patient not taking: Reported on 1/13/2025), Disp: , Rfl:     blood sugar diagnostic (TRUE METRIX GLUCOSE TEST STRIP) Strp, Use strip to test blood sugar once daily, Disp: 100 each, Rfl: 5    blood sugar diagnostic Strp, Check blood sugar every day, Disp: 100 each, Rfl: 5    blood sugar diagnostic Strp, Use to check sugar once per day., Disp: , Rfl:     blood-glucose meter Misc, Use to check sugar., Disp: , Rfl:     cetirizine (ZYRTEC) 10 MG tablet, Take 1 tablet (10 mg total) by mouth once daily., Disp: 90 tablet, Rfl: 3     cetirizine (ZYRTEC) 10 MG tablet, Oral for 30 Days, Disp: , Rfl:     chlorthalidone (HYGROTEN) 25 MG Tab, Take 1 tablet (25 mg total) by mouth once daily., Disp: 90 tablet, Rfl: 3    ciclopirox (PENLAC) 8 % Soln, APPLY EXTERNALLY TO TOENAILS ONCE DAILY (Patient not taking: Reported on 2/5/2025), Disp: , Rfl:     clotrimazole (LOTRIMIN) 1 % cream, Apply topically 2 (two) times daily. (Patient not taking: Reported on 2/5/2025), Disp: , Rfl:     clotrimazole (LOTRIMIN) 1 % Soln, Apply between the toes two times a day. (Patient not taking: Reported on 2/5/2025), Disp: 30 mL, Rfl: 2    fluticasone propionate (FLONASE) 50 mcg/actuation nasal spray, Fluticasone Propionate 50 MCG/ACT Nasal Suspension QTY: 16 gram Days: 30 Refills: 5  Written: 09/22/22 Patient Instructions: 2 sprays in each nostril once a day prn allergy symptoms (Patient not taking: Reported on 2/5/2025), Disp: , Rfl:     gabapentin (NEURONTIN) 300 MG capsule, OPEN capsule into a tablespoon and consume with sip of liquid. Take once the MORNING OF SURGERY. After surgery: take one capsule TWICE DAILY for at least 3 days and up to 5 days as needed for pain., Disp: 11 capsule, Rfl: 0    lancets Misc, Use to check sugar once per day., Disp: , Rfl:     omeprazole (PRILOSEC) 40 MG capsule, Take 1 capsule (40 mg total) by mouth every morning. Open capsule and take with apple sauce, Disp: 30 capsule, Rfl: 2    ondansetron (ZOFRAN-ODT) 8 MG TbDL, Dissolve 1 tablet (8 mg total) by mouth every 6 (six) hours as needed (Nausea)., Disp: 30 tablet, Rfl: 0    polyethylene glycol (GLYCOLAX) 17 gram/dose powder, Use cap to measure 17 grams.  Dissolve as directed and take by mouth once daily., Disp: 510 g, Rfl: 0    promethazine (PHENERGAN) 12.5 MG Supp, Place 1 suppository (12.5 mg total) rectally every 6 (six) hours as needed (nausea, 2nd line)., Disp: 5 suppository, Rfl: 0    sacubitriL-valsartan (ENTRESTO)  mg per tablet, Take one tablet by mouth twice daily. Stop  taking amlodipine, Disp: 180 tablet, Rfl: 2    tirzepatide 7.5 mg/0.5 mL PnIj, Inject 7.5 mg into the skin every 7 days., Disp: 6 mL, Rfl: 3    ursodioL (TODD FORTE) 500 MG tablet, Take 1 tablet (500 mg total) by mouth once daily. For gallstone prevention., Disp: 180 tablet, Rfl: 0    Past Surgical History:   Procedure Laterality Date    CARDIAC SURGERY      at age 7    [place]SE SURG[place]    [facility]       Social History     Socioeconomic History    Marital status: Single    Number of children: 0   Tobacco Use    Smoking status: Never     Passive exposure: Never    Smokeless tobacco: Never   Substance and Sexual Activity    Alcohol use: Not Currently     Comment: Occasionally    Drug use: No    Sexual activity: Yes     Partners: Male     Birth control/protection: OCP, Condom   Social History Narrative    Single in a relationship. . Exercises regularly. No children. History of miscarriages     Social Drivers of Health     Financial Resource Strain: Medium Risk (8/16/2024)    Overall Financial Resource Strain (CARDIA)     Difficulty of Paying Living Expenses: Somewhat hard   Food Insecurity: No Food Insecurity (8/16/2024)    Hunger Vital Sign     Worried About Running Out of Food in the Last Year: Never true     Ran Out of Food in the Last Year: Never true   Transportation Needs: Unmet Transportation Needs (5/24/2024)    Received from [facility]    PRAPARE - Transportation     Lack of Transportation (Medical): Yes     Lack of Transportation (Non-Medical): Yes   Physical Activity: Insufficiently Active (8/16/2024)    Exercise Vital Sign     Days of Exercise per Week: 3 days     Minutes of Exercise per Session: 40 min   Stress: No Stress Concern Present (8/16/2024)    [employer] [place] [employer] - Occupational Stress Questionnaire     Feeling of Stress : Not at all   Housing Stability: High Risk (8/16/2024)    Housing Stability Vital Sign     Unable to Pay for Housing in the Last  Year: Yes       OBJECTIVE:     Vital Signs Range (Last 24H):         Significant Labs:  Lab Results   Component Value Date    WBC 6.07 12/17/2024    HGB 11.1 (L) 12/17/2024    HCT 35.8 (L) 12/17/2024     12/17/2024    CHOL 194 08/29/2024    TRIG 162 (H) 08/29/2024    HDL 32 (L) 08/29/2024    ALT 17 12/17/2024    ALT 17 12/17/2024    AST 15 12/17/2024    AST 15 12/17/2024     12/17/2024    K 3.9 12/17/2024     12/17/2024    CREATININE 1.2 12/17/2024    BUN 20 12/17/2024    CO2 24 12/17/2024    TSH 2.455 12/17/2024    HGBA1C 5.8 (H) 12/17/2024       Diagnostic Studies: No relevant studies.    EKG:   Results for orders placed or performed during the hospital encounter of 05/13/23   EKG 12-lead    Collection Time: 05/13/23 10:55 PM    Narrative    Test Reason : chest pain    Vent. Rate : 090 BPM     Atrial Rate : 090 BPM     P-R Int : 168 ms          QRS Dur : 088 ms      QT Int : 362 ms       P-R-T Axes : 066 038 -12 degrees     QTc Int : 442 ms    Normal sinus rhythm  Voltage criteria for left ventricular hypertrophy ( R in aVL , Sokolow-Holland   , [place] product )  Nonspecific ST and T wave abnormality  Abnormal ECG  When compared with ECG of 23-MAY-2021 07:13,  Inverted T waves have replaced nonspecific T wave abnormality in Inferior  leads  Confirmed by Saul Rod MD ([id number]) on 5/15/2023 4:30:04 PM    Referred By: [email or web address]   SELF           Confirmed By:Saul Rod MD       2D ECHO:  TTE:  Results for orders placed or performed during the hospital encounter of 07/02/24   Echo Saline Bubble? Yes   Result Value Ref Range    BSA 2.19 m2    LVOT stroke volume 64.21 cm3    LVIDd 5.7 3.5 - 6.0 cm    LV Systolic Volume 76.70 mL    LV Systolic Volume Index 37.2 mL/m2    LVIDs 4.16 (A) 2.1 - 4.0 cm    LV Diastolic Volume 177.07 mL    LV Diastolic Volume Index 85.96 mL/m2    Left Ventricular End Systolic Volume by Teichholz Method 76.70 mL    Left Ventricular End Diastolic Volume by Teichholz  Method 177.07 mL    IVS 1.3 (A) 0.6 - 1.1 cm    LVOT diameter 2.12 cm    LVOT area 3.5 cm2    FS 27 (A) 28 - 44 %    Left Ventricle Relative Wall Thickness 0.46 cm    PW 1.3 (A) 0.6 - 1.1 cm    LV mass 322.25 g    LV Mass Index 156 g/m2    MV Peak E Nikhil 0.99 m/s    TDI LATERAL 0.07 m/s    TDI SEPTAL 0.07 m/s    E/E' ratio 14.14 m/s    MV Peak A Nikhil 0.61 m/s    TR Max Nikhil 2.34 m/s    E/A ratio 1.62     E wave deceleration time 153.76 msec    LV SEPTAL E/E' RATIO 14.14 m/s    LV LATERAL E/E' RATIO 14.14 m/s    PV Peak S Nikhil 0.49 m/s    PV Peak D Nikhil 0.42 m/s    Pulm vein S/D ratio 1.17     LVOT peak nikhil 0.78 m/s    Left Ventricular Outflow Tract Mean Velocity 0.54 cm/s    Left Ventricular Outflow Tract Mean Gradient 1.32 mmHg    RV/LV Ratio 0.55 cm    LA size 3.46 cm    Left Atrium Minor Axis 5.16 cm    Left Atrium Major Axis 4.80 cm    RA Major Axis 4.17 cm    AV mean gradient 3 mmHg    AV peak gradient 5 mmHg    Ao peak nikhil 1.12 m/s    Ao VTI 21.90 cm    LVOT peak VTI 18.20 cm    AV valve area 2.93 cm²    AV Velocity Ratio 0.70     AV index (prosthetic) 0.83     BELINDA by Velocity Ratio 2.46 cm²    MV mean gradient 2 mmHg    MV peak gradient 6 mmHg    MV stenosis pressure 1/2 time 43.54 ms    MV valve area p 1/2 method 5.05 cm2    MV valve area by continuity eq 2.18 cm2    MV VTI 29.4 cm    Triscuspid Valve Regurgitation Peak Gradient 22 mmHg    PV PEAK VELOCITY 0.74 m/s    PV peak gradient 2 mmHg    Pulmonary Valve Mean Velocity 0.53 m/s    Ao root annulus 2.66 cm    STJ 2.35 cm    Ascending aorta 2.23 cm    IVC diameter 1.22 cm    Mean e' 0.07 m/s    ZLVIDS 0.70     ZLVIDD -0.87     RVDD 3.16 cm    AORTIC VALVE CUSP SEPERATION 1.59 cm    OSWALDO 24.9 mL/m2    LA Vol 51.19 cm3    LA Vol (MOD) 47.00 cm3    LA WIDTH 3.5 cm    OSWALDO (MOD) 22.8 mL/m2    RA Width 2.9 cm    TV resting pulmonary artery pressure 22 mmHg    RV TB RVSP 2 mmHg    Est. RA pres 0 mmHg    Narrative      Left Ventricle: The left ventricle is mildly to  moderately dilated.   Mildly to moderately increased wall thickness. Unable to assess wall   motion. There is moderately reduced systolic function with a visually   estimated ejection fraction of 30 - 35%.  Technically difficult estimation   of LVEF.  Echocontrast could help for more definitive evaluation.  There   is indeterminate diastolic function.    Mitral Valve: There is trace to mild regurgitation.    Right Ventricle: Normal right ventricular cavity size. Systolic   function is normal.    Pericardium: There is a trivial effusion.    No evidence of intracardiac shunt on bubble study.         CARMEN:  No results found for this or any previous visit.    ASSESSMENT/PLAN:           Pre-op Assessment    I have reviewed the Patient Summary Reports.     I have reviewed the Nursing Notes. I have reviewed the NPO Status.   I have reviewed the Medications.     Review of Systems  Anesthesia Hx:  No problems with previous Anesthesia   History of prior surgery of interest to airway management or planning:          Denies Family Hx of Anesthesia complications.    Denies Personal Hx of Anesthesia complications.                    Social:  Non-Smoker       Hematology/Oncology:  Hematology Normal   Oncology Normal                                   Cardiovascular:     Hypertension   Denies MI.  Denies CAD.       Denies Angina. CHF      Denies MENDIETA.    Patient on beta blockers                          Pulmonary:    Denies COPD.  Denies Asthma.    Sleep Apnea, CPAP                Renal/:  Renal/ Normal                 Hepatic/GI:     GERD, well controlled Denies Liver Disease.   Taking GLP-1 Agonists Instructed to Hold for 7 Days           Musculoskeletal:  Musculoskeletal Normal                Neurological:  Neurology Normal Denies TIA.  Denies CVA.    Denies Seizures.                                Endocrine:  Diabetes, well controlled, type 2         Morbid Obesity / BMI > 40  Psych:  Psychiatric History                   Physical Exam  General: Well nourished, Cooperative, Alert and Oriented    Airway:  Mallampati: II   Mouth Opening: Normal  TM Distance: Normal  Tongue: Normal  Neck ROM: Normal ROM    Dental:  Intact    Chest/Lungs:  Normal Respiratory Rate    Heart:  Rate: Normal        Anesthesia Plan  Type of Anesthesia, risks & benefits discussed:    Anesthesia Type: Gen ETT  Intra-op Monitoring Plan: Standard ASA Monitors  Post Op Pain Control Plan: multimodal analgesia and peripheral nerve block  Induction:  IV  Airway Plan: Direct  Informed Consent: Informed consent signed with the Patient and all parties understand the risks and agree with anesthesia plan.  All questions answered.   ASA Score: 3  Day of Surgery Review of History & Physical: H&P Update referred to the surgeon/provider.    Ready For Surgery From Anesthesia Perspective.     .

## 2025-02-06 NOTE — TELEPHONE ENCOUNTER
Notified patient of [place] time [place] the [facility] at 1000 with expected surgery start time 1200 on 2/7/2025. Instructed patient regarding pre-op instructions including no protein shakes or sugar free clear liquids after midnight but can have a rare sip of water for comfort, showering and preop medications to hold/take per anesthesia/preop. Reminded pt to drink pre-surgery beverage or 8 oz water and take one dose of Gabapentin at 0930. Instructed patient on the s/s of dehydration and for patient to call at the first sign of dehydration. Informed patient that someone from bariatrics will call them 1 week post op to review diet/fluid intake and to ensure adequate hydration. Reminded patient not to take antibiotics for 30 days following surgery or schedule elective procedures involving anesthesia/sedation for 30 days following surgery unless checking with the bariatric clinic first. Pt verbalized understanding. Pt given office phone number for any additional questions/concerns.

## 2025-02-07 ENCOUNTER — ANESTHESIA (OUTPATIENT)
Dept: SURGERY | Facility: HOSPITAL | Age: 40
DRG: 620 | End: 2025-02-07
Payer: COMMERCIAL

## 2025-02-07 ENCOUNTER — HOSPITAL ENCOUNTER (INPATIENT)
Facility: HOSPITAL | Age: 40
LOS: 1 days | Discharge: HOME | DRG: 620 | End: 2025-02-08
Attending: SURGERY | Admitting: SURGERY
Payer: COMMERCIAL

## 2025-02-07 DIAGNOSIS — E66.9 OBESITY, UNSPECIFIED CLASS, UNSPECIFIED OBESITY TYPE, UNSPECIFIED WHETHER SERIOUS COMORBIDITY PRESENT: ICD-10-CM

## 2025-02-07 DIAGNOSIS — E66.813 CLASS 3 SEVERE OBESITY DUE TO EXCESS CALORIES WITH SERIOUS COMORBIDITY AND BODY MASS INDEX (BMI) OF 40.0 TO 44.9 IN ADULT: Primary | ICD-10-CM

## 2025-02-07 DIAGNOSIS — E11.59 HYPERTENSION ASSOCIATED WITH TYPE 2 DIABETES MELLITUS: ICD-10-CM

## 2025-02-07 DIAGNOSIS — E78.5 HYPERLIPIDEMIA ASSOCIATED WITH TYPE 2 DIABETES MELLITUS: ICD-10-CM

## 2025-02-07 DIAGNOSIS — E66.01 CLASS 3 SEVERE OBESITY DUE TO EXCESS CALORIES WITH SERIOUS COMORBIDITY AND BODY MASS INDEX (BMI) OF 40.0 TO 44.9 IN ADULT: Primary | ICD-10-CM

## 2025-02-07 DIAGNOSIS — E11.69 HYPERLIPIDEMIA ASSOCIATED WITH TYPE 2 DIABETES MELLITUS: ICD-10-CM

## 2025-02-07 DIAGNOSIS — E11.9 TYPE 2 DIABETES MELLITUS WITHOUT COMPLICATION, WITHOUT LONG-TERM CURRENT USE OF INSULIN: ICD-10-CM

## 2025-02-07 DIAGNOSIS — I15.2 HYPERTENSION ASSOCIATED WITH TYPE 2 DIABETES MELLITUS: ICD-10-CM

## 2025-02-07 LAB
B-HCG UR QL: NEGATIVE
CTP QC/QA: YES
POCT GLUCOSE: 132 MG/DL (ref 70–110)
POCT GLUCOSE: 149 MG/DL (ref 70–110)
POCT GLUCOSE: 97 MG/DL (ref 70–110)

## 2025-02-07 PROCEDURE — 37000009 HC ANESTHESIA EA ADD 15 MINS: Performed by: SURGERY

## 2025-02-07 PROCEDURE — 25000003 PHARM REV CODE 250: Performed by: NURSE ANESTHETIST, CERTIFIED REGISTERED

## 2025-02-07 PROCEDURE — 63600175 PHARM REV CODE 636 W HCPCS: Performed by: SURGERY

## 2025-02-07 PROCEDURE — 0BQT4ZZ REPAIR DIAPHRAGM, PERCUTANEOUS ENDOSCOPIC APPROACH: ICD-10-PCS | Performed by: SURGERY

## 2025-02-07 PROCEDURE — 71000016 HC POSTOP RECOV ADDL HR: Performed by: SURGERY

## 2025-02-07 PROCEDURE — 63600175 PHARM REV CODE 636 W HCPCS

## 2025-02-07 PROCEDURE — 36000713 HC OR TIME LEV V EA ADD 15 MIN: Performed by: SURGERY

## 2025-02-07 PROCEDURE — 43644 LAP GASTRIC BYPASS/ROUX-EN-Y: CPT | Mod: ,,, | Performed by: SURGERY

## 2025-02-07 PROCEDURE — 8E0W4CZ ROBOTIC ASSISTED PROCEDURE OF TRUNK REGION, PERCUTANEOUS ENDOSCOPIC APPROACH: ICD-10-PCS | Performed by: SURGERY

## 2025-02-07 PROCEDURE — 0D164ZA BYPASS STOMACH TO JEJUNUM, PERCUTANEOUS ENDOSCOPIC APPROACH: ICD-10-PCS | Performed by: SURGERY

## 2025-02-07 PROCEDURE — 36000712 HC OR TIME LEV V 1ST 15 MIN: Performed by: SURGERY

## 2025-02-07 PROCEDURE — 25000003 PHARM REV CODE 250

## 2025-02-07 PROCEDURE — 11000001 HC ACUTE MED/SURG PRIVATE ROOM

## 2025-02-07 PROCEDURE — 37000008 HC ANESTHESIA 1ST 15 MINUTES: Performed by: SURGERY

## 2025-02-07 PROCEDURE — 63600175 PHARM REV CODE 636 W HCPCS: Performed by: NURSE ANESTHETIST, CERTIFIED REGISTERED

## 2025-02-07 PROCEDURE — 81025 URINE PREGNANCY TEST: CPT | Performed by: SURGERY

## 2025-02-07 PROCEDURE — 71000015 HC POSTOP RECOV 1ST HR: Performed by: SURGERY

## 2025-02-07 PROCEDURE — 25000003 PHARM REV CODE 250: Performed by: SURGERY

## 2025-02-07 PROCEDURE — 0DJ08ZZ INSPECTION OF UPPER INTESTINAL TRACT, VIA NATURAL OR ARTIFICIAL OPENING ENDOSCOPIC: ICD-10-PCS | Performed by: SURGERY

## 2025-02-07 PROCEDURE — C1781 MESH (IMPLANTABLE): HCPCS | Performed by: SURGERY

## 2025-02-07 PROCEDURE — 27201423 OPTIME MED/SURG SUP & DEVICES STERILE SUPPLY: Performed by: SURGERY

## 2025-02-07 PROCEDURE — 82962 GLUCOSE BLOOD TEST: CPT

## 2025-02-07 PROCEDURE — 71000033 HC RECOVERY, INTIAL HOUR: Performed by: SURGERY

## 2025-02-07 DEVICE — SEAMGUARD ESCHELON 60 MM.: Type: IMPLANTABLE DEVICE | Site: ABDOMEN | Status: FUNCTIONAL

## 2025-02-07 RX ORDER — SODIUM CHLORIDE, SODIUM LACTATE, POTASSIUM CHLORIDE, CALCIUM CHLORIDE 600; 310; 30; 20 MG/100ML; MG/100ML; MG/100ML; MG/100ML
INJECTION, SOLUTION INTRAVENOUS CONTINUOUS
Status: DISCONTINUED | OUTPATIENT
Start: 2025-02-07 | End: 2025-02-08 | Stop reason: HOSPADM

## 2025-02-07 RX ORDER — OXYCODONE HCL 5 MG/5 ML
5 SOLUTION, ORAL ORAL EVERY 6 HOURS PRN
Status: DISCONTINUED | OUTPATIENT
Start: 2025-02-07 | End: 2025-02-08 | Stop reason: HOSPADM

## 2025-02-07 RX ORDER — METRONIDAZOLE 500 MG/100ML
500 INJECTION, SOLUTION INTRAVENOUS
Status: COMPLETED | OUTPATIENT
Start: 2025-02-07 | End: 2025-02-07

## 2025-02-07 RX ORDER — LIDOCAINE HYDROCHLORIDE 10 MG/ML
1 INJECTION, SOLUTION EPIDURAL; INFILTRATION; INTRACAUDAL; PERINEURAL ONCE
Status: DISCONTINUED | OUTPATIENT
Start: 2025-02-07 | End: 2025-02-07 | Stop reason: HOSPADM

## 2025-02-07 RX ORDER — GABAPENTIN 250 MG/5ML
300 SOLUTION ORAL 2 TIMES DAILY
Status: DISCONTINUED | OUTPATIENT
Start: 2025-02-07 | End: 2025-02-08 | Stop reason: HOSPADM

## 2025-02-07 RX ORDER — MUPIROCIN 20 MG/G
OINTMENT TOPICAL
Status: DISCONTINUED | OUTPATIENT
Start: 2025-02-07 | End: 2025-02-07 | Stop reason: HOSPADM

## 2025-02-07 RX ORDER — BUPIVACAINE HYDROCHLORIDE AND EPINEPHRINE 2.5; 5 MG/ML; UG/ML
INJECTION, SOLUTION EPIDURAL; INFILTRATION; INTRACAUDAL; PERINEURAL
Status: DISCONTINUED | OUTPATIENT
Start: 2025-02-07 | End: 2025-02-07 | Stop reason: HOSPADM

## 2025-02-07 RX ORDER — HEPARIN SODIUM 5000 [USP'U]/ML
5000 INJECTION, SOLUTION INTRAVENOUS; SUBCUTANEOUS ONCE
Status: COMPLETED | OUTPATIENT
Start: 2025-02-07 | End: 2025-02-07

## 2025-02-07 RX ORDER — SODIUM CHLORIDE 0.9 % (FLUSH) 0.9 %
10 SYRINGE (ML) INJECTION
Status: DISCONTINUED | OUTPATIENT
Start: 2025-02-07 | End: 2025-02-07 | Stop reason: HOSPADM

## 2025-02-07 RX ORDER — ACETAMINOPHEN 650 MG/20.3ML
500 LIQUID ORAL
Status: DISCONTINUED | OUTPATIENT
Start: 2025-02-07 | End: 2025-02-08 | Stop reason: HOSPADM

## 2025-02-07 RX ORDER — FAMOTIDINE 10 MG/ML
20 INJECTION INTRAVENOUS ONCE
Status: COMPLETED | OUTPATIENT
Start: 2025-02-07 | End: 2025-02-07

## 2025-02-07 RX ORDER — ACETAMINOPHEN 10 MG/ML
1000 INJECTION, SOLUTION INTRAVENOUS ONCE
Status: COMPLETED | OUTPATIENT
Start: 2025-02-07 | End: 2025-02-07

## 2025-02-07 RX ORDER — ENOXAPARIN SODIUM 100 MG/ML
40 INJECTION SUBCUTANEOUS EVERY 12 HOURS
Status: DISCONTINUED | OUTPATIENT
Start: 2025-02-07 | End: 2025-02-08

## 2025-02-07 RX ORDER — SODIUM CHLORIDE 9 MG/ML
INJECTION, SOLUTION INTRAVENOUS CONTINUOUS
Status: DISCONTINUED | OUTPATIENT
Start: 2025-02-07 | End: 2025-02-08 | Stop reason: HOSPADM

## 2025-02-07 RX ORDER — DEXMEDETOMIDINE HYDROCHLORIDE 100 UG/ML
INJECTION, SOLUTION INTRAVENOUS
Status: DISCONTINUED | OUTPATIENT
Start: 2025-02-07 | End: 2025-02-07

## 2025-02-07 RX ORDER — LIDOCAINE HYDROCHLORIDE 20 MG/ML
INJECTION INTRAVENOUS
Status: DISCONTINUED | OUTPATIENT
Start: 2025-02-07 | End: 2025-02-07

## 2025-02-07 RX ORDER — LIDOCAINE HYDROCHLORIDE 10 MG/ML
INJECTION, SOLUTION INFILTRATION; PERINEURAL
Status: DISCONTINUED | OUTPATIENT
Start: 2025-02-07 | End: 2025-02-07 | Stop reason: HOSPADM

## 2025-02-07 RX ORDER — KETOROLAC TROMETHAMINE 15 MG/ML
15 INJECTION, SOLUTION INTRAMUSCULAR; INTRAVENOUS ONCE
Status: COMPLETED | OUTPATIENT
Start: 2025-02-07 | End: 2025-02-07

## 2025-02-07 RX ORDER — KETAMINE HCL IN 0.9 % NACL 50 MG/5 ML
SYRINGE (ML) INTRAVENOUS
Status: DISCONTINUED | OUTPATIENT
Start: 2025-02-07 | End: 2025-02-07

## 2025-02-07 RX ORDER — GLUCAGON 1 MG
1 KIT INJECTION
Status: DISCONTINUED | OUTPATIENT
Start: 2025-02-07 | End: 2025-02-07 | Stop reason: HOSPADM

## 2025-02-07 RX ORDER — PROPOFOL 10 MG/ML
VIAL (ML) INTRAVENOUS
Status: DISCONTINUED | OUTPATIENT
Start: 2025-02-07 | End: 2025-02-07

## 2025-02-07 RX ORDER — ACETAMINOPHEN 650 MG/20.3ML
1000 LIQUID ORAL EVERY 8 HOURS
Status: DISCONTINUED | OUTPATIENT
Start: 2025-02-07 | End: 2025-02-08 | Stop reason: HOSPADM

## 2025-02-07 RX ORDER — DEXAMETHASONE SODIUM PHOSPHATE 4 MG/ML
INJECTION, SOLUTION INTRA-ARTICULAR; INTRALESIONAL; INTRAMUSCULAR; INTRAVENOUS; SOFT TISSUE
Status: DISCONTINUED | OUTPATIENT
Start: 2025-02-07 | End: 2025-02-07

## 2025-02-07 RX ORDER — ONDANSETRON HYDROCHLORIDE 2 MG/ML
8 INJECTION, SOLUTION INTRAVENOUS EVERY 6 HOURS
Status: DISCONTINUED | OUTPATIENT
Start: 2025-02-07 | End: 2025-02-08 | Stop reason: HOSPADM

## 2025-02-07 RX ORDER — MIDAZOLAM HYDROCHLORIDE 1 MG/ML
INJECTION INTRAMUSCULAR; INTRAVENOUS
Status: DISCONTINUED | OUTPATIENT
Start: 2025-02-07 | End: 2025-02-07

## 2025-02-07 RX ORDER — CEFAZOLIN SODIUM 1 G/3ML
INJECTION, POWDER, FOR SOLUTION INTRAMUSCULAR; INTRAVENOUS
Status: DISCONTINUED | OUTPATIENT
Start: 2025-02-07 | End: 2025-02-07

## 2025-02-07 RX ORDER — PANTOPRAZOLE SODIUM 40 MG/10ML
40 INJECTION, POWDER, LYOPHILIZED, FOR SOLUTION INTRAVENOUS 2 TIMES DAILY
Status: DISCONTINUED | OUTPATIENT
Start: 2025-02-07 | End: 2025-02-08 | Stop reason: HOSPADM

## 2025-02-07 RX ORDER — ROCURONIUM BROMIDE 10 MG/ML
INJECTION, SOLUTION INTRAVENOUS
Status: DISCONTINUED | OUTPATIENT
Start: 2025-02-07 | End: 2025-02-07

## 2025-02-07 RX ORDER — PROCHLORPERAZINE EDISYLATE 5 MG/ML
5 INJECTION INTRAMUSCULAR; INTRAVENOUS EVERY 6 HOURS PRN
Status: DISCONTINUED | OUTPATIENT
Start: 2025-02-07 | End: 2025-02-08 | Stop reason: HOSPADM

## 2025-02-07 RX ORDER — SCOPOLAMINE 1 MG/3D
1 PATCH, EXTENDED RELEASE TRANSDERMAL ONCE
Status: DISCONTINUED | OUTPATIENT
Start: 2025-02-07 | End: 2025-02-08 | Stop reason: HOSPADM

## 2025-02-07 RX ORDER — ONDANSETRON HYDROCHLORIDE 2 MG/ML
INJECTION, SOLUTION INTRAVENOUS
Status: DISCONTINUED | OUTPATIENT
Start: 2025-02-07 | End: 2025-02-07

## 2025-02-07 RX ORDER — PHENYLEPHRINE HYDROCHLORIDE 10 MG/ML
INJECTION INTRAVENOUS
Status: DISCONTINUED | OUTPATIENT
Start: 2025-02-07 | End: 2025-02-07

## 2025-02-07 RX ADMIN — ONDANSETRON 4 MG: 2 INJECTION INTRAMUSCULAR; INTRAVENOUS at 04:02

## 2025-02-07 RX ADMIN — HEPARIN SODIUM 5000 UNITS: 5000 INJECTION INTRAVENOUS; SUBCUTANEOUS at 12:02

## 2025-02-07 RX ADMIN — LIDOCAINE HYDROCHLORIDE 100 MG: 20 INJECTION INTRAVENOUS at 12:02

## 2025-02-07 RX ADMIN — SCOPOLAMINE 1 PATCH: 1.5 PATCH, EXTENDED RELEASE TRANSDERMAL at 12:02

## 2025-02-07 RX ADMIN — MIDAZOLAM HYDROCHLORIDE 2 MG: 2 INJECTION, SOLUTION INTRAMUSCULAR; INTRAVENOUS at 12:02

## 2025-02-07 RX ADMIN — METRONIDAZOLE 500 MG: 500 INJECTION, SOLUTION INTRAVENOUS at 12:02

## 2025-02-07 RX ADMIN — SODIUM CHLORIDE, SODIUM GLUCONATE, SODIUM ACETATE, POTASSIUM CHLORIDE, MAGNESIUM CHLORIDE, SODIUM PHOSPHATE, DIBASIC, AND POTASSIUM PHOSPHATE: .53; .5; .37; .037; .03; .012; .00082 INJECTION, SOLUTION INTRAVENOUS at 12:02

## 2025-02-07 RX ADMIN — SODIUM CHLORIDE, POTASSIUM CHLORIDE, SODIUM LACTATE AND CALCIUM CHLORIDE: 600; 310; 30; 20 INJECTION, SOLUTION INTRAVENOUS at 11:02

## 2025-02-07 RX ADMIN — ACETAMINOPHEN 1000 MG: 10 INJECTION, SOLUTION INTRAVENOUS at 01:02

## 2025-02-07 RX ADMIN — PROPOFOL 200 MG: 10 INJECTION, EMULSION INTRAVENOUS at 12:02

## 2025-02-07 RX ADMIN — SUGAMMADEX 400 MG: 100 INJECTION, SOLUTION INTRAVENOUS at 04:02

## 2025-02-07 RX ADMIN — ENOXAPARIN SODIUM 40 MG: 40 INJECTION SUBCUTANEOUS at 09:02

## 2025-02-07 RX ADMIN — ROCURONIUM BROMIDE 10 MG: 10 INJECTION, SOLUTION INTRAVENOUS at 04:02

## 2025-02-07 RX ADMIN — MUPIROCIN: 20 OINTMENT TOPICAL at 12:02

## 2025-02-07 RX ADMIN — ACETAMINOPHEN 999.01 MG: 650 SOLUTION ORAL at 09:02

## 2025-02-07 RX ADMIN — FAMOTIDINE 20 MG: 10 INJECTION, SOLUTION INTRAVENOUS at 12:02

## 2025-02-07 RX ADMIN — PANTOPRAZOLE SODIUM 40 MG: 40 INJECTION, POWDER, LYOPHILIZED, FOR SOLUTION INTRAVENOUS at 09:02

## 2025-02-07 RX ADMIN — ROCURONIUM BROMIDE 30 MG: 10 INJECTION, SOLUTION INTRAVENOUS at 02:02

## 2025-02-07 RX ADMIN — KETOROLAC TROMETHAMINE 15 MG: 15 INJECTION, SOLUTION INTRAMUSCULAR; INTRAVENOUS at 12:02

## 2025-02-07 RX ADMIN — DEXMEDETOMIDINE 8 MCG: 200 INJECTION, SOLUTION INTRAVENOUS at 04:02

## 2025-02-07 RX ADMIN — SODIUM CHLORIDE, POTASSIUM CHLORIDE, SODIUM LACTATE AND CALCIUM CHLORIDE: 600; 310; 30; 20 INJECTION, SOLUTION INTRAVENOUS at 05:02

## 2025-02-07 RX ADMIN — PHENYLEPHRINE HYDROCHLORIDE 100 MCG: 10 INJECTION INTRAVENOUS at 12:02

## 2025-02-07 RX ADMIN — PROPOFOL 50 MG: 10 INJECTION, EMULSION INTRAVENOUS at 12:02

## 2025-02-07 RX ADMIN — CEFAZOLIN 2 G: 330 INJECTION, POWDER, FOR SOLUTION INTRAMUSCULAR; INTRAVENOUS at 01:02

## 2025-02-07 RX ADMIN — DEXAMETHASONE SODIUM PHOSPHATE 8 MG: 4 INJECTION INTRA-ARTICULAR; INTRALESIONAL; INTRAMUSCULAR; INTRAVENOUS; SOFT TISSUE at 12:02

## 2025-02-07 RX ADMIN — Medication 25 MG: at 02:02

## 2025-02-07 RX ADMIN — GABAPENTIN 300 MG: 250 SOLUTION ORAL at 09:02

## 2025-02-07 RX ADMIN — ONDANSETRON 8 MG: 2 INJECTION INTRAMUSCULAR; INTRAVENOUS at 06:02

## 2025-02-07 RX ADMIN — ROCURONIUM BROMIDE 100 MG: 10 INJECTION, SOLUTION INTRAVENOUS at 12:02

## 2025-02-07 NOTE — ANESTHESIA PROCEDURE NOTES
Intubation    Date/Time: 2/7/2025 12:49 PM    Performed by: Tiffany Justin MD  Authorized by: Nova Nuñez MD    Intubation:     Induction:  Rapid sequence induction    Intubated:  Postinduction    Mask Ventilation:  Not attempted    Attempts:  1    Attempted By:  Student    Method of Intubation:  Video laryngoscopy    Blade:  Quintana 3    Laryngeal View Grade: Grade I - full view of cords      Difficult Airway Encountered?: No      Complications:  None    Airway Device:  Oral endotracheal tube    Airway Device Size:  7.0    Style/Cuff Inflation:  Cuffed    Inflation Amount (mL):  8    Tube secured:  21    Secured at:  The lips    Placement Verified By:  Capnometry    Complicating Factors:  None    Findings Post-Intubation:  BS equal bilateral and atraumatic/condition of teeth unchanged

## 2025-02-07 NOTE — TRANSFER OF CARE
"Anesthesia Transfer of Care Note    Patient: Melissa Carvalho    Procedure(s) Performed: Procedure(s) (LRB):  XI ROBOTIC GASTROENTEROSTOMY, MADI-EN-Y with intra op EGD (N/A)  BLOCK, TRANSVERSUS ABDOMINIS PLANE  XI ROBOTIC REPAIR, HERNIA, HIATAL    Patient location: PACU    Anesthesia Type: general    Transport from OR: Transported from OR on room air with adequate spontaneous ventilation    Post pain: adequate analgesia    Post assessment: no apparent anesthetic complications and tolerated procedure well    Post vital signs: stable    Level of consciousness: sedated    Nausea/Vomiting: no nausea/vomiting    Complications: none    Transfer of care protocol was followed      Last vitals: Visit Vitals  /68 (BP Location: Right arm, Patient Position: Lying)   Pulse 72   Temp 36.6 °C (97.8 °F) (Temporal)   Resp (!) 98   Ht 5' 1" (1.549 m)   Wt 93.9 kg (207 lb 0.2 oz)   SpO2 100%   Breastfeeding No   BMI 39.11 kg/m²     "

## 2025-02-07 NOTE — OP NOTE
Preoperative Diagnosis  Hypertension associated with type 2 diabetes mellitus [E11.59, I15.2]  Hyperlipidemia associated with type 2 diabetes mellitus [E11.69, E78.5]  Type 2 diabetes mellitus without complication, without long-term current use of insulin [E11.9]  Morbid obesity [E66.01]  CIPRIANO on CPAP [G47.33]    Post Operative Diagnosis:  As above    Procedure Performed:  1. Robotic assisted laparoscopic selwyn-en-y gastric bypass  2. Robotic assisted laparoscopic hiatal hernia repair (sliding)  3. Intraoperative upper endoscopy  4. Laparoscopic bilateral transversus abdominus plane block for post-operative pain    Date of Procedure: 2/7/2025    Surgeons and Role:     * Laurie Hector MD - Primary     * Keila Johnson MD - Resident - Chief    Type of Anesthesia Used: General anesthesia    Description of Findings:   Small but obvious sliding hiatal hernia defect, repaired.  Antecolic antegastric bypass with 75cm BPL and 150cm Selwyn limb with linear stapled GJ and JJ anastomoses.  Intraoperative leak test negative.    Estimated Blood Loss: Minimal    Specimen(s) Removed:  None    Complications: None    Drains: none    Fluids: See anesthesia flowsheet    Urine Output: Not recorded - no byers catheter.    Indications: Melissa Carvalho is an 39 y.o. female who is having surgery for morbid obesity and weight related complications.    Procedure Details: The patient was seen in the Pre-Operative Holding Area. The risks, benefits, complications, treatment options, non-operative alternatives, expected recovery and outcomes were discussed with the patient. The possibilities of reaction to medication, pulmonary aspiration, injury to surrounding structures, bleeding, recurrent infection, the need for additional procedures, failure to diagnose a condition, and creating a complication requiring transfusion or operation were discussed with the patient. The patient concurred with the proposed plan, giving informed consent. The site of  surgery was properly noted/marked if necessary per policy. The patient was actively warmed in pre-operative area. Pre-operative antibiotics and venous thrombosis prophylaxis were ordered and administered.     The patient was taken to the operating room and placed in the supine position on the operating table. Bilateral sequential compression devices were applied. An orogastric Visi-G 36F calibration tube was inserted. The patient was positioned on the table on a foot board with ankles and knees secured. The arms were out and the patient was on a bariatric padded table. Her abdomen was prepped and draped in standard surgical fashion During the 3 minute dry time, a surgical time out was done confirming the correct patient, operation and that all the anticipated supplies and instruments were available in the room.    The site of the first trocar was anesthetized with local anesthesia. A Veress needle was inserted in the left subcostal region and a saline drop test was negative. The abdomen was insufflated adequately and an 8 mm visually optic trocar was placed under direct vision in the left periumbilical region. A laparoscopic survey of the abdomen was conducted. There were no signifiicant adhesions and there was no injury from the Veress needle stick which was then removed. The remainder of the trocars were placed under direct visualization including a 12 mm robotic trocar in the right mid abdomen and two 8mm trocars in the left mid and lateral abdomen. As each trocar site was identified it was injected with local anesthesia at the skin, fascia and peritoneum. The patient was positioned in reverse Trendelenburg position. The diaphragm and the GE junction could be visualized indicating a gastric bypass would be possible. There was surprisingly a small hiatal hernia despite the preoperative upper GI being unremarkable. The robot was then deployed and docked. A 2-0 v-loc suture was used to suspend the left lobe of the  liver and expose the stomach and hiatus.    For the first step, the patient's omentum was lifted into the upper abdomen. The mesocolon adjacent to his ligament of Treitz was identified. Small bowel was run 75 cm from the ligament of Treitz and tagged proximally and distally using 2-0 silk sutures with differing suture tail lengths for orientation. This easily reached the mid-body of the stomach without tension. The orogastric tube was withdrawn into the esophagus. The omentum was split up to the edge of the level of the transverse colon and greater curvature with the vessel sealer.     We then proceeded to a left digna approach, took down the angle of His and gained access into the mediastinum and carefully worked in the mediastinum circumferentially dissecting the esophagus, identifying and preserving the anterior and posterior vagus and completely reducing the sac from the aorta posteriorly, pericardium anteriorly and pleural bilaterally. We dissected a total of about 5-6cm along the esophagus into the mediastinum. This gave us 3-4 cm of intraabdominal esophagus without tension. We then closed the posterior digna using two 2-0 ethibond sutures in a figure of eight fashion such that the closure allowed passage of a tip up grasper along the esophagus. A single ethibond anterior suture was necessary. Please note this added a significant amount of time to the operation.    A perigastric window was made along the lesser curve approximately 5cm distal to the GE junction and the lesser sac was inspected. The gastric pouch was created using a series of blue 60mm staple loads. The first staple line was more horizontal in orientation and without a staple line reinforcement. The remaining vertically-oriented staples were placed with Seamguard reinforcement along, but staying wide of, the calibration tube. The angle of His was cleaned anteriorly and posteriorly and a complete gastric transection was assured.     Small bowel was  then brought up in up in an ante-colic, ante-gastric fashion through the split omentum, in an Omega loop orientation keeping the mesentery flat and untwisted and with the proximal bowel to the patient's left. Enterotomies were created in the posterior stomach and the Rios limb. A blue staple load was inserted intraluminally to approximately 2.5 cm and fired. The calibration tube was directed down and across the gastrojejunal anastomosis. The enterotomy defect was closed in 2 layers with a running absorbable V-loc suture over the tube. A mesenteric window was then made in the small bowel just to the left of the gastrojejunostomy and the biliopancreatic limb transected from the rios limb, using the Visi-G as a calibration tube to limit the size of the candycane limb without impinging on the anastomosis. This was done using a white 60mm load. The rios limb was occluded with a grasper and diluted ICG used to fill the pouch and proximal rios limb to perform a leak test under fluorescence imaging, which was negative. The Rios limb was measured 125 cm distally. The Rios limb and the biliopancreatic limb were then sutured together using a 2-0 silk suture. The bowel limbs were opened above the stay sutures with electrosurgical energy. A 60mm white staple load was inserted into the lumen, held upward in an anti-mesenteric fashion and fired. The enterotomy was then re-approximated with a running 3-0 V-loc absorbable suture. An antiobstruction stitch of Brolin was placed using 2-0 ethibond and used to elevate the JJ anastomosis to facilitate the mesenteric defect closure using a running nonabsorbable 2-0 V-loc suture. Soto's space between the Rios limb mesentery and the transverse colon mesentery and split omentum was closed with a running 2-0 ethibond suture.    The liver retraction suture was cut and removed. The robot was undocked. The 12mm port site was closed using an 0-vicryl transfascial suture. Using 10ml of local  anesthesia per side, a laparoscopic TAP block was performed for post-operative pain control under direct vision. The remainder of the trocars were removed and pneumoperitoneum allowed to escape. The skin was closed with 4-0 monocryl sutures, following which dermabond was applied. I was present for and directed the entire procedure. The patient was extubated and taken to the recovery room in stable condition. Intraoperative findings were communicated to the patient's family. Given the additional work, time and effort involved in repair of this patient's addition hiatal hernia I do believe a modifer-22 is warranted.     Disposition: PACU - hemodynamically stable.    Laurie Hector MD FACS  Minimally Invasive General & Bariatric Surgery

## 2025-02-07 NOTE — INTERVAL H&P NOTE
The patient has been examined and the H&P has been reviewed:    I concur with the findings and no changes have occurred since H&P was written.    Surgery risks, benefits and alternative options discussed and understood by patient/family.    Keila Johnson MD  General Surgery, PGY-5    There are no hospital problems to display for this patient.

## 2025-02-08 VITALS
RESPIRATION RATE: 18 BRPM | OXYGEN SATURATION: 99 % | HEART RATE: 75 BPM | BODY MASS INDEX: 40.04 KG/M2 | HEIGHT: 61 IN | TEMPERATURE: 98 F | SYSTOLIC BLOOD PRESSURE: 134 MMHG | WEIGHT: 212.06 LBS | DIASTOLIC BLOOD PRESSURE: 71 MMHG

## 2025-02-08 LAB
ANION GAP SERPL CALC-SCNC: 12 MMOL/L (ref 8–16)
ANION GAP SERPL CALC-SCNC: 9 MMOL/L (ref 8–16)
BASOPHILS # BLD AUTO: 0 K/UL (ref 0–0.2)
BASOPHILS # BLD AUTO: 0.01 K/UL (ref 0–0.2)
BASOPHILS NFR BLD: 0 % (ref 0–1.9)
BASOPHILS NFR BLD: 0.1 % (ref 0–1.9)
BUN SERPL-MCNC: 38 MG/DL (ref 6–20)
BUN SERPL-MCNC: 52 MG/DL (ref 6–20)
CALCIUM SERPL-MCNC: 8.8 MG/DL (ref 8.7–10.5)
CALCIUM SERPL-MCNC: 9.1 MG/DL (ref 8.7–10.5)
CHLORIDE SERPL-SCNC: 108 MMOL/L (ref 95–110)
CHLORIDE SERPL-SCNC: 111 MMOL/L (ref 95–110)
CO2 SERPL-SCNC: 16 MMOL/L (ref 23–29)
CO2 SERPL-SCNC: 17 MMOL/L (ref 23–29)
CREAT SERPL-MCNC: 1.6 MG/DL (ref 0.5–1.4)
CREAT SERPL-MCNC: 2.1 MG/DL (ref 0.5–1.4)
DIFFERENTIAL METHOD BLD: ABNORMAL
DIFFERENTIAL METHOD BLD: ABNORMAL
EOSINOPHIL # BLD AUTO: 0 K/UL (ref 0–0.5)
EOSINOPHIL # BLD AUTO: 0 K/UL (ref 0–0.5)
EOSINOPHIL NFR BLD: 0 % (ref 0–8)
EOSINOPHIL NFR BLD: 0 % (ref 0–8)
ERYTHROCYTE [DISTWIDTH] IN BLOOD BY AUTOMATED COUNT: 15.1 % (ref 11.5–14.5)
ERYTHROCYTE [DISTWIDTH] IN BLOOD BY AUTOMATED COUNT: 15.2 % (ref 11.5–14.5)
EST. GFR  (NO RACE VARIABLE): 30.5 ML/MIN/1.73 M^2
EST. GFR  (NO RACE VARIABLE): 42.3 ML/MIN/1.73 M^2
GLUCOSE SERPL-MCNC: 114 MG/DL (ref 70–110)
GLUCOSE SERPL-MCNC: 99 MG/DL (ref 70–110)
HCT VFR BLD AUTO: 29.6 % (ref 37–48.5)
HCT VFR BLD AUTO: 31.8 % (ref 37–48.5)
HGB BLD-MCNC: 10.2 G/DL (ref 12–16)
HGB BLD-MCNC: 9.8 G/DL (ref 12–16)
IMM GRANULOCYTES # BLD AUTO: 0.03 K/UL (ref 0–0.04)
IMM GRANULOCYTES # BLD AUTO: 0.05 K/UL (ref 0–0.04)
IMM GRANULOCYTES NFR BLD AUTO: 0.3 % (ref 0–0.5)
IMM GRANULOCYTES NFR BLD AUTO: 0.5 % (ref 0–0.5)
LYMPHOCYTES # BLD AUTO: 1 K/UL (ref 1–4.8)
LYMPHOCYTES # BLD AUTO: 1.1 K/UL (ref 1–4.8)
LYMPHOCYTES NFR BLD: 11.1 % (ref 18–48)
LYMPHOCYTES NFR BLD: 11.2 % (ref 18–48)
MAGNESIUM SERPL-MCNC: 2 MG/DL (ref 1.6–2.6)
MCH RBC QN AUTO: 28.2 PG (ref 27–31)
MCH RBC QN AUTO: 28.7 PG (ref 27–31)
MCHC RBC AUTO-ENTMCNC: 32.1 G/DL (ref 32–36)
MCHC RBC AUTO-ENTMCNC: 33.1 G/DL (ref 32–36)
MCV RBC AUTO: 87 FL (ref 82–98)
MCV RBC AUTO: 88 FL (ref 82–98)
MONOCYTES # BLD AUTO: 0.2 K/UL (ref 0.3–1)
MONOCYTES # BLD AUTO: 0.4 K/UL (ref 0.3–1)
MONOCYTES NFR BLD: 1.9 % (ref 4–15)
MONOCYTES NFR BLD: 3.8 % (ref 4–15)
NEUTROPHILS # BLD AUTO: 7.9 K/UL (ref 1.8–7.7)
NEUTROPHILS # BLD AUTO: 8.1 K/UL (ref 1.8–7.7)
NEUTROPHILS NFR BLD: 84.4 % (ref 38–73)
NEUTROPHILS NFR BLD: 86.7 % (ref 38–73)
NRBC BLD-RTO: 0 /100 WBC
NRBC BLD-RTO: 0 /100 WBC
PHOSPHATE SERPL-MCNC: 4.1 MG/DL (ref 2.7–4.5)
PLATELET # BLD AUTO: 240 K/UL (ref 150–450)
PLATELET # BLD AUTO: 247 K/UL (ref 150–450)
PMV BLD AUTO: 11.2 FL (ref 9.2–12.9)
PMV BLD AUTO: 11.5 FL (ref 9.2–12.9)
POCT GLUCOSE: 137 MG/DL (ref 70–110)
POTASSIUM SERPL-SCNC: 4.4 MMOL/L (ref 3.5–5.1)
POTASSIUM SERPL-SCNC: 4.7 MMOL/L (ref 3.5–5.1)
RBC # BLD AUTO: 3.41 M/UL (ref 4–5.4)
RBC # BLD AUTO: 3.62 M/UL (ref 4–5.4)
SODIUM SERPL-SCNC: 136 MMOL/L (ref 136–145)
SODIUM SERPL-SCNC: 137 MMOL/L (ref 136–145)
WBC # BLD AUTO: 9.14 K/UL (ref 3.9–12.7)
WBC # BLD AUTO: 9.56 K/UL (ref 3.9–12.7)

## 2025-02-08 PROCEDURE — 85025 COMPLETE CBC W/AUTO DIFF WBC: CPT | Mod: 91 | Performed by: STUDENT IN AN ORGANIZED HEALTH CARE EDUCATION/TRAINING PROGRAM

## 2025-02-08 PROCEDURE — 36415 COLL VENOUS BLD VENIPUNCTURE: CPT | Performed by: STUDENT IN AN ORGANIZED HEALTH CARE EDUCATION/TRAINING PROGRAM

## 2025-02-08 PROCEDURE — 25000003 PHARM REV CODE 250: Performed by: SURGERY

## 2025-02-08 PROCEDURE — 80048 BASIC METABOLIC PNL TOTAL CA: CPT | Mod: 91 | Performed by: STUDENT IN AN ORGANIZED HEALTH CARE EDUCATION/TRAINING PROGRAM

## 2025-02-08 PROCEDURE — 80048 BASIC METABOLIC PNL TOTAL CA: CPT | Performed by: SURGERY

## 2025-02-08 PROCEDURE — 63600175 PHARM REV CODE 636 W HCPCS: Performed by: STUDENT IN AN ORGANIZED HEALTH CARE EDUCATION/TRAINING PROGRAM

## 2025-02-08 PROCEDURE — 83735 ASSAY OF MAGNESIUM: CPT | Performed by: SURGERY

## 2025-02-08 PROCEDURE — 36415 COLL VENOUS BLD VENIPUNCTURE: CPT | Performed by: SURGERY

## 2025-02-08 PROCEDURE — 85025 COMPLETE CBC W/AUTO DIFF WBC: CPT | Performed by: SURGERY

## 2025-02-08 PROCEDURE — 84100 ASSAY OF PHOSPHORUS: CPT | Performed by: SURGERY

## 2025-02-08 PROCEDURE — 63600175 PHARM REV CODE 636 W HCPCS: Performed by: SURGERY

## 2025-02-08 PROCEDURE — 25000003 PHARM REV CODE 250: Performed by: STUDENT IN AN ORGANIZED HEALTH CARE EDUCATION/TRAINING PROGRAM

## 2025-02-08 PROCEDURE — 99024 POSTOP FOLLOW-UP VISIT: CPT | Mod: ,,, | Performed by: SURGERY

## 2025-02-08 RX ORDER — HEPARIN SODIUM 5000 [USP'U]/ML
7500 INJECTION, SOLUTION INTRAVENOUS; SUBCUTANEOUS EVERY 8 HOURS
Status: DISCONTINUED | OUTPATIENT
Start: 2025-02-08 | End: 2025-02-08 | Stop reason: HOSPADM

## 2025-02-08 RX ORDER — DEXAMETHASONE SODIUM PHOSPHATE 4 MG/ML
4 INJECTION, SOLUTION INTRA-ARTICULAR; INTRALESIONAL; INTRAMUSCULAR; INTRAVENOUS; SOFT TISSUE EVERY 6 HOURS
Status: COMPLETED | OUTPATIENT
Start: 2025-02-08 | End: 2025-02-08

## 2025-02-08 RX ORDER — CARVEDILOL 25 MG/1
25 TABLET ORAL 2 TIMES DAILY WITH MEALS
Status: DISCONTINUED | OUTPATIENT
Start: 2025-02-08 | End: 2025-02-08 | Stop reason: HOSPADM

## 2025-02-08 RX ORDER — METOCLOPRAMIDE HYDROCHLORIDE 5 MG/ML
10 INJECTION INTRAMUSCULAR; INTRAVENOUS EVERY 6 HOURS
Status: COMPLETED | OUTPATIENT
Start: 2025-02-08 | End: 2025-02-08

## 2025-02-08 RX ORDER — ATORVASTATIN CALCIUM 40 MG/1
40 TABLET, FILM COATED ORAL DAILY
Status: DISCONTINUED | OUTPATIENT
Start: 2025-02-08 | End: 2025-02-08 | Stop reason: HOSPADM

## 2025-02-08 RX ADMIN — ACETAMINOPHEN 999.01 MG: 650 SOLUTION ORAL at 03:02

## 2025-02-08 RX ADMIN — ONDANSETRON 8 MG: 2 INJECTION INTRAMUSCULAR; INTRAVENOUS at 01:02

## 2025-02-08 RX ADMIN — ACETAMINOPHEN 999.01 MG: 650 SOLUTION ORAL at 06:02

## 2025-02-08 RX ADMIN — METOCLOPRAMIDE 10 MG: 5 INJECTION, SOLUTION INTRAMUSCULAR; INTRAVENOUS at 08:02

## 2025-02-08 RX ADMIN — SODIUM CHLORIDE 1000 ML: 9 INJECTION, SOLUTION INTRAVENOUS at 08:02

## 2025-02-08 RX ADMIN — CARVEDILOL 25 MG: 25 TABLET, FILM COATED ORAL at 05:02

## 2025-02-08 RX ADMIN — DEXAMETHASONE SODIUM PHOSPHATE 4 MG: 4 INJECTION INTRA-ARTICULAR; INTRALESIONAL; INTRAMUSCULAR; INTRAVENOUS; SOFT TISSUE at 11:02

## 2025-02-08 RX ADMIN — ATORVASTATIN CALCIUM 40 MG: 40 TABLET, FILM COATED ORAL at 11:02

## 2025-02-08 RX ADMIN — METOCLOPRAMIDE 10 MG: 5 INJECTION, SOLUTION INTRAMUSCULAR; INTRAVENOUS at 11:02

## 2025-02-08 RX ADMIN — GABAPENTIN 300 MG: 250 SOLUTION ORAL at 08:02

## 2025-02-08 RX ADMIN — DEXAMETHASONE SODIUM PHOSPHATE 4 MG: 4 INJECTION INTRA-ARTICULAR; INTRALESIONAL; INTRAMUSCULAR; INTRAVENOUS; SOFT TISSUE at 08:02

## 2025-02-08 RX ADMIN — METOCLOPRAMIDE 10 MG: 5 INJECTION, SOLUTION INTRAMUSCULAR; INTRAVENOUS at 05:02

## 2025-02-08 RX ADMIN — HEPARIN SODIUM 7500 UNITS: 5000 INJECTION INTRAVENOUS; SUBCUTANEOUS at 03:02

## 2025-02-08 RX ADMIN — PANTOPRAZOLE SODIUM 40 MG: 40 INJECTION, POWDER, LYOPHILIZED, FOR SOLUTION INTRAVENOUS at 08:02

## 2025-02-08 NOTE — NURSING
Report received from Jasmyn, CLIFFORDU  Pt admitted to unit. VS stable. Pt AAOx4; able to answer all admissions questions appropriately. 4 eyes check done with MEGHAN Hoff. Surgical site C/D/I w/dermabond. Pt ambulating; tolerating well. Call light within reach. Will continue plan of care.

## 2025-02-08 NOTE — PLAN OF CARE
Problem: Adult Inpatient Plan of Care  Goal: Plan of Care Review  Outcome: Progressing  Goal: Patient-Specific Goal (Individualized)  Outcome: Progressing  Goal: Absence of Hospital-Acquired Illness or Injury  Outcome: Progressing  Goal: Optimal Comfort and Wellbeing  Outcome: Progressing  Goal: Readiness for Transition of Care  Outcome: Progressing     Problem: Diabetes Comorbidity  Goal: Blood Glucose Level Within Targeted Range  Outcome: Progressing     Problem: Bariatric Environmental Safety  Goal: Safety Maintained with Care  Outcome: Progressing     Problem: Wound  Goal: Optimal Coping  Outcome: Progressing  Goal: Optimal Functional Ability  Outcome: Progressing  Goal: Absence of Infection Signs and Symptoms  Outcome: Progressing  Goal: Improved Oral Intake  Outcome: Progressing  Goal: Optimal Pain Control and Function  Outcome: Progressing  Goal: Skin Health and Integrity  Outcome: Progressing  Goal: Optimal Wound Healing  Outcome: Progressing     Problem: Fall Injury Risk  Goal: Absence of Fall and Fall-Related Injury  Outcome: Progressing

## 2025-02-08 NOTE — PROGRESS NOTES
[facility] - [facility]  General Surgery  Progress Note    Subjective:     History of Present Illness:  No notes on file    Post-Op Info:  Procedure(s) (LRB):  XI ROBOTIC GASTROENTEROSTOMY, MADI-EN-Y with intra op EGD (N/A)  BLOCK, TRANSVERSUS ABDOMINIS PLANE  XI ROBOTIC REPAIR, HERNIA, HIATAL   1 Day Post-Op     Interval History:   No acute events overnight.   Doing well this AM.   Tolerated water protocol. Denies nausea.   Ambulating.   Pain well controlled.   Cr. 2.1 this AM.   Voiding appropriately.     Medications:  Continuous Infusions:   0.9% NaCl   Intravenous Continuous        lactated ringers   Intravenous Continuous 125 mL/hr at 02/08/25 0419 Rate Verify at 02/08/25 0419     Scheduled Meds:   acetaminophen  999.0148 mg Oral Q8H    dexAMETHasone injection  4 mg Intravenous Q6H    gabapentin  300 mg Oral BID    heparin (porcine)  7,500 Units Subcutaneous Q8H    metoclopramide  10 mg Intravenous Q6H    ondansetron  8 mg Intravenous Q6H    pantoprazole  40 mg Intravenous BID    scopolamine  1 patch Transdermal Once     PRN Meds:  Current Facility-Administered Medications:     acetaminophen, 499.5074 mg, Oral, Q24H PRN    oxyCODONE, 5 mg, Oral, Q6H PRN    prochlorperazine, 5 mg, Intravenous, Q6H PRN    simethicone, 40 mg, Oral, QID PRN     Review of patient's allergies indicates:  No Known Allergies  Objective:     Vital Signs (Most Recent):  Temp: 98.2 °F (36.8 °C) (02/08/25 0754)  Pulse: 86 (02/08/25 0754)  Resp: 18 (02/08/25 0754)  BP: 124/77 (02/08/25 0754)  SpO2: 96 % (02/08/25 0754) Vital Signs (24h Range):  Temp:  [97.5 °F (36.4 °C)-98.2 °F (36.8 °C)] 98.2 °F (36.8 °C)  Pulse:  [63-95] 86  Resp:  [14-98] 18  SpO2:  [95 %-100 %] 96 %  BP: (116-143)/(59-87) 124/77     Weight: 96.2 kg (212 lb 1.3 oz)  Body mass index is 40.07 kg/m².    Intake/Output - Last 3 Shifts         02/06 0700  02/07 0659 02/07 0700 02/08 0659 02/08 0700 02/09 0659    I.V. (mL/kg)  1313.8 (13.7)     IV Piggyback  1200     Total  Intake(mL/kg)  2513.8 (26.1)     Net  +2513.8                     Physical Exam  Constitutional:       General: She is not in acute distress.  HENT:      Head: Normocephalic and atraumatic.   Eyes:      Extraocular Movements: Extraocular movements intact.      Conjunctiva/sclera: Conjunctivae normal.      Pupils: Pupils are equal, round, and reactive to light.   Cardiovascular:      Rate and Rhythm: Normal rate and regular rhythm.   Pulmonary:      Effort: Pulmonary effort is normal. No respiratory distress.   Abdominal:      General: There is no distension.      Palpations: Abdomen is soft.      Comments: Incisions c/d/I  Appropriate tenderness   Neurological:      General: No focal deficit present.      Mental Status: She is alert.          Significant Labs:  I have reviewed all pertinent lab results within the past 24 hours.  CBC:   Recent Labs   Lab 02/08/25  0350   WBC 9.56   RBC 3.62*   HGB 10.2*   HCT 31.8*      MCV 88   MCH 28.2   MCHC 32.1     CMP:   Recent Labs   Lab 02/08/25  0350   GLU 99   CALCIUM 9.1      K 4.7   CO2 16*      BUN 52*   CREATININE 2.1*       Significant Diagnostics:  I have reviewed all pertinent imaging results/findings within the past 24 hours.  Assessment/Plan:     * Obesity  Melissa Carvalho is an 39 y.o. female that is s/p robotic selwyn-en-y gastric bypass on 2/7/2025. On pathway, tolerated water protocol yesterday. Doing well this morning.      - Diet: Bariatric clear liquid  - Pain: multimodal, minimize narcotics  - OK for pills < pencil eraser, liquids, or crushed  - Nausea control w/ scheduled and PRN antiemetics   - Home meds as appropriate  - Replete lytes PRN  - Daily labs  - OOB to chair and ambulate in halls   - Encourage IS  - DVT ppx   - MAULIK on AM labs. Fluid bolus given this AM. Will get PM BMP and CBC.     Dispo: Will plan for possible discharge this afternoon vs tomorrow if MAULIK resolves, patient tolerating diet, and able to remain well hydrated.  Post-op instructions given and discussed with patient this morning.           Keila Johnson MD  General Surgery  [facility] - Surgery

## 2025-02-08 NOTE — SUBJECTIVE & OBJECTIVE
Interval History:   No acute events overnight.   Doing well this AM.   Tolerated water protocol. Denies nausea.   Ambulating.   Pain well controlled.   Cr. 2.1 this AM.   Voiding appropriately.     Medications:  Continuous Infusions:   0.9% NaCl   Intravenous Continuous        lactated ringers   Intravenous Continuous 125 mL/hr at 02/08/25 0419 Rate Verify at 02/08/25 0419     Scheduled Meds:   acetaminophen  999.0148 mg Oral Q8H    dexAMETHasone injection  4 mg Intravenous Q6H    gabapentin  300 mg Oral BID    heparin (porcine)  7,500 Units Subcutaneous Q8H    metoclopramide  10 mg Intravenous Q6H    ondansetron  8 mg Intravenous Q6H    pantoprazole  40 mg Intravenous BID    scopolamine  1 patch Transdermal Once     PRN Meds:  Current Facility-Administered Medications:     acetaminophen, 499.5074 mg, Oral, Q24H PRN    oxyCODONE, 5 mg, Oral, Q6H PRN    prochlorperazine, 5 mg, Intravenous, Q6H PRN    simethicone, 40 mg, Oral, QID PRN     Review of patient's allergies indicates:  No Known Allergies  Objective:     Vital Signs (Most Recent):  Temp: 98.2 °F (36.8 °C) (02/08/25 0754)  Pulse: 86 (02/08/25 0754)  Resp: 18 (02/08/25 0754)  BP: 124/77 (02/08/25 0754)  SpO2: 96 % (02/08/25 0754) Vital Signs (24h Range):  Temp:  [97.5 °F (36.4 °C)-98.2 °F (36.8 °C)] 98.2 °F (36.8 °C)  Pulse:  [63-95] 86  Resp:  [14-98] 18  SpO2:  [95 %-100 %] 96 %  BP: (116-143)/(59-87) 124/77     Weight: 96.2 kg (212 lb 1.3 oz)  Body mass index is 40.07 kg/m².    Intake/Output - Last 3 Shifts         02/06 0700  02/07 0659 02/07 0700 02/08 0659 02/08 0700  02/09 0659    I.V. (mL/kg)  1313.8 (13.7)     IV Piggyback  1200     Total Intake(mL/kg)  2513.8 (26.1)     Net  +2513.8                     Physical Exam  Constitutional:       General: She is not in acute distress.  HENT:      Head: Normocephalic and atraumatic.   Eyes:      Extraocular Movements: Extraocular movements intact.      Conjunctiva/sclera: Conjunctivae normal.      Pupils:  Pupils are equal, round, and reactive to light.   Cardiovascular:      Rate and Rhythm: Normal rate and regular rhythm.   Pulmonary:      Effort: Pulmonary effort is normal. No respiratory distress.   Abdominal:      General: There is no distension.      Palpations: Abdomen is soft.      Comments: Incisions c/d/I  Appropriate tenderness   Neurological:      General: No focal deficit present.      Mental Status: She is alert.          Significant Labs:  I have reviewed all pertinent lab results within the past 24 hours.  CBC:   Recent Labs   Lab 02/08/25  0350   WBC 9.56   RBC 3.62*   HGB 10.2*   HCT 31.8*      MCV 88   MCH 28.2   MCHC 32.1     CMP:   Recent Labs   Lab 02/08/25  0350   GLU 99   CALCIUM 9.1      K 4.7   CO2 16*      BUN 52*   CREATININE 2.1*       Significant Diagnostics:  I have reviewed all pertinent imaging results/findings within the past 24 hours.

## 2025-02-08 NOTE — DISCHARGE INSTRUCTIONS
Outpatient post-operative instructions:    Diet: Follow your diet book and call the bariatric clinic for any questions.  Medications: - Miralax daily for constipation, no fiber.  - No NSAIDs such as aleve, ibuprofen, naproxen  - No swallowing whole pills until cleared by your surgeon. Crush all medications and mix in liquid. Open all capsules into liquid.  Wounds: Dermabond is over your incisions and will flake off on its own over the next 10-14 days  Bathing: Please do not take any baths or swim until after being seen by you doctor.  You may shower after the bandaids have been removed.  It is ok for the shower water to run onto the wounds, wash with soap and water normally.  Please pat them dry.  Activity:  For most laparoscopic cases patients must be light duty for 2 weeks.  If you have had a hernia repair you must continue light duty for 6 weeks.  Light duty means no lifting over 10 pounds (about a gallon of milk) or equivalent activity.  No golfing except putting, no fishing, no vacuuming and no mowing.  For exercise please keep to light exercise only.  No weight lifting or strenuous exercise.  Walking as much as you are comfortable to do is ok.  Driving is ok once you feel fully alert, off pain medication and have no pain bending or twisting.  Sex is ok once the pain has subsided and as long as it is not strenuous while you are on light duty.  Work: You may do light duty work when you feel up to it.  For most patients that is 1-2 weeks after surgery.  This is light duty only until you are off restrictions.

## 2025-02-08 NOTE — PLAN OF CARE
Case Management Assessment     PCP: Dr Ina Worthy  Pharmacy: [facility] at [phone]    Patient Arrived From: home   Existing Help at Home: yes , he significant other     Barriers to Discharge: none     Discharge Plan:    A. Home    B. Home with home health             02/08/25 1505   Discharge Assessment   Assessment Type Discharge Planning Assessment   Confirmed/corrected address, phone number and insurance Yes   Confirmed Demographics Correct on Facesheet   Source of Information patient   If unable to respond/provide information was family/caregiver contacted? Yes   Contact Name/Number Angel Mares significant other at [phone]   Does patient/caregiver understand observation status Yes  (patient is inpatient)   Communicated ROBBIN with patient/caregiver Yes   Reason For Admission Patient was admitted for surgery   People in Home significant other   Facility Arrived From: patient presented to the ED from home   Do you expect to return to your current living situation? Yes   Do you have help at home or someone to help you manage your care at home? Yes   Who are your caregiver(s) and their phone number(s)? Angel Mares significant other at [phone]   Prior to hospitilization cognitive status: Unable to Assess   Current cognitive status: Alert/Oriented   Walking or Climbing Stairs Difficulty no   Dressing/Bathing Difficulty no   Equipment Currently Used at Home none   Readmission within 30 days? No   Patient currently being followed by outpatient case management? No   Do you currently have service(s) that help you manage your care at home? No   Do you take prescription medications? Yes   Do you have prescription coverage? Yes   Coverage [employer] [facility] [employer]   Do you have any problems affording any of your prescribed medications? No   Is the patient taking medications as prescribed? yes   Who is going to help you get home at discharge? Angel Mares, significant other at  [phone]   How do you get to doctors appointments? car, drives self;family or friend will provide   Are you on dialysis? No   Do you take coumadin? No   Discharge Plan A Home   Discharge Plan B Home with family   DME Needed Upon Discharge  other (see comments)   Discharge Plan discussed with: Patient   Transition of Care Barriers None   Physical Activity   On average, how many days per week do you engage in moderate to strenuous exercise (like a brisk walk)? 3 days   On average, how many minutes do you engage in exercise at this level? 40 min   Financial Resource Strain   How hard is it for you to pay for the very basics like food, housing, medical care, and heating? Somewhat   Housing Stability   In the last 12 months, was there a time when you were not able to pay the mortgage or rent on time? Y   Transportation Needs   Has the lack of transportation kept you from medical appointments, meetings, work or from getting things needed for daily living? No   Food Insecurity   Within the past 12 months, you worried that your food would run out before you got the money to buy more. Never true   Within the past 12 months, the food you bought just didn't last and you didn't have money to get more. Never true   Stress   Do you feel stress - tense, restless, nervous, or anxious, or unable to sleep at night because your mind is troubled all the time - these days? Not at all   Social Isolation   How often do you feel lonely or isolated from those around you?  Never   Alcohol Use   Q1: How often do you have a drink containing alcohol? Monthly or l   Q2: How many drinks containing alcohol do you have on a typical day when you are drinking? 1 or 2   Utilities   In the past 12 months has the electric, gas, oil, or water company threatened to shut off services in your home? No   Health Literacy   How often do you need to have someone help you when you read instructions, pamphlets, or other written material from your doctor or  pharmacy? Rarely   OTHER   Name(s) of People in Home Angel Mares, significant other at [phone]

## 2025-02-08 NOTE — NURSING TRANSFER
Nursing Transfer Note      2/7/2025   8:37 PM    Reason patient is being transferred: post procedure    Transfer To:  530    Transfer via bed    Transported by PCT, RN    Any special needs or follow-up needed: routine    Patient belongings transferred with patient: No    Chart send with patient: Yes    Notified: spouse    Patient reassessed at:  (2/7/2025, 2000)

## 2025-02-08 NOTE — ASSESSMENT & PLAN NOTE
Melissa Carvalho is an 39 y.o. female that is s/p robotic selwyn-en-y gastric bypass on 2/7/2025. On pathway, tolerated water protocol yesterday. Doing well this morning.      - Diet: Bariatric clear liquid  - Pain: multimodal, minimize narcotics  - OK for pills < pencil eraser, liquids, or crushed  - Nausea control w/ scheduled and PRN antiemetics   - Home meds as appropriate  - Replete lytes PRN  - Daily labs  - OOB to chair and ambulate in halls   - Encourage IS  - DVT ppx   - MAULIK on AM labs. Fluid bolus given this AM. Will get PM BMP and CBC.     Dispo: Will plan for possible discharge this afternoon vs tomorrow if MAULIK resolves, patient tolerating diet, and able to remain well hydrated. Post-op instructions given and discussed with patient this morning.

## 2025-02-08 NOTE — NURSING
Pt is being discharged today. Pt will receive discharge instructions and verbalize understanding. IV will be taken out.

## 2025-02-09 NOTE — HOSPITAL COURSE
Patient admitted for above procedures and tolerated without complication. MAULIK on AM labs. Improved in PM. Pain well controlled, tolerating diet, ambulating, and voiding appropriately. Deemed okay for discharge with instructions to follow up outpatient in 2 weeks.

## 2025-02-09 NOTE — DISCHARGE SUMMARY
[facility]  General Surgery  Discharge Summary      Patient Name: Melissa Carvalho  MRN: [id number]  Admission Date: 2/7/2025  Hospital Length of Stay: 1 days  Discharge Date and Time:  02/08/2025 6:53 PM  Attending Physician: No att. providers found   Discharging Provider: Keila Johnson MD  Primary Care Provider: Ina Worthy DO    HPI:   No notes on file    Procedure(s) (LRB):  XI ROBOTIC GASTROENTEROSTOMY, MADI-EN-Y with intra op EGD (N/A)  BLOCK, TRANSVERSUS ABDOMINIS PLANE  XI ROBOTIC REPAIR, HERNIA, HIATAL      Indwelling Lines/Drains at time of discharge:   Lines/Drains/Airways       None                 Hospital Course: Patient admitted for above procedures and tolerated without complication. MAULIK on AM labs. Improved in PM. Pain well controlled, tolerating diet, ambulating, and voiding appropriately. Deemed okay for discharge with instructions to follow up outpatient in 2 weeks.    Goals of Care Treatment Preferences:         Consults:     Significant Diagnostic Studies: N/A    Pending Diagnostic Studies:       None          Final Active Diagnoses:    Diagnosis Date Noted POA    PRINCIPAL PROBLEM:  Obesity [E66.9] 02/07/2025 Yes      Problems Resolved During this Admission:      Discharged Condition: good    Disposition: Home or Self Care    Follow Up:   Follow-up Information       Laurie Hector MD Follow up in 2 week(s).    Specialties: Surgery, Bariatrics  Why: For wound re-check  Contact information:  [address]  [facility] [address]  [phone]                           Patient Instructions:      Diet clear liquid   Order Comments: Bariatric high protein     Other restrictions (specify):   Order Comments: See patient instructions     Notify your health care provider if you experience any of the following:  increased confusion or weakness     Notify your health care provider if you experience any of the following:  persistent dizziness, light-headedness, or visual disturbances      Notify your health care provider if you experience any of the following:  worsening rash     Notify your health care provider if you experience any of the following:  severe persistent headache     Notify your health care provider if you experience any of the following:  difficulty breathing or increased cough     Notify your health care provider if you experience any of the following:  redness, tenderness, or signs of infection (pain, swelling, redness, odor or green/yellow discharge around incision site)     Notify your health care provider if you experience any of the following:  severe uncontrolled pain     Notify your health care provider if you experience any of the following:  persistent nausea and vomiting or diarrhea     Notify your health care provider if you experience any of the following:  temperature >100.4     No dressing needed     Medications:  Reconciled Home Medications:      Medication List        CONTINUE taking these medications      atorvastatin 40 MG tablet  Commonly known as: LIPITOR  Take 1 tablet (40 mg total) by mouth once daily.     * blood sugar diagnostic Strp  Commonly known as: TRUE METRIX GLUCOSE TEST STRIP  Use strip to test blood sugar once daily     * FREESTYLE LITE STRIPS Strp  Generic drug: blood sugar diagnostic  Check blood sugar every day     * blood sugar diagnostic Strp  Use to check sugar once per day.     blood-glucose meter Misc  Use to check sugar.     carvediloL 25 MG tablet  Commonly known as: COREG  Take 1 tablet (25 mg total) by mouth 2 (two) times daily with meals.     * cetirizine 10 MG tablet  Commonly known as: ZYRTEC  Oral for 30 Days     * cetirizine 10 MG tablet  Commonly known as: ZYRTEC  Take 1 tablet (10 mg total) by mouth once daily.     chlorthalidone 25 MG Tab  Commonly known as: HYGROTEN  Take 1 tablet (25 mg total) by mouth once daily.     ENTRESTO  mg per tablet  Generic drug: sacubitriL-valsartan  Take one tablet by mouth twice daily.  Stop taking amlodipine     gabapentin 300 MG capsule  Commonly known as: NEURONTIN  OPEN capsule into a tablespoon and consume with sip of liquid. Take once the MORNING OF SURGERY. After surgery: take one capsule TWICE DAILY for at least 3 days and up to 5 days as needed for pain.     lancets Misc  Use to check sugar once per day.     MOUNJARO 7.5 mg/0.5 mL Pnij  Generic drug: tirzepatide  Inject 7.5 mg into the skin every 7 days.     multivitamin per tablet  Commonly known as: THERAGRAN  Take 1 tablet by mouth once daily.     norethindrone 0.35 mg tablet  Commonly known as: MICRONOR  Take 1 tablet (0.35 mg total) by mouth once daily.     omeprazole 40 MG capsule  Commonly known as: PRILOSEC  Take 1 capsule (40 mg total) by mouth every morning. Open capsule and take with apple sauce     ondansetron 8 MG Tbdl  Commonly known as: ZOFRAN-ODT  Dissolve 1 tablet (8 mg total) by mouth every 6 (six) hours as needed (Nausea).     polyethylene glycol 17 gram/dose powder  Commonly known as: GLYCOLAX  Use cap to measure 17 grams.  Dissolve as directed and take by mouth once daily.     promethazine 12.5 MG Supp  Commonly known as: PHENERGAN  Place 1 suppository (12.5 mg total) rectally every 6 (six) hours as needed (nausea, 2nd line).     TYLENOL EXTRA STRENGTH 500 mg Pwpk  Generic drug: acetaminophen  Take 1,000 mg by mouth 3 (three) times daily. Take 1,000mg (2 packets) THREE TIMES DAILY for at least 3 days and up to 5 days as needed for pain. May take an additional packet (500mg) once daily if required for pain. Do not take more than 4,000mg in 24 hours. for 5 days     ursodioL 500 MG tablet  Commonly known as: TODD FORTE  Take 1 tablet (500 mg total) by mouth once daily. For gallstone prevention.     VITAMIN D2 1,250 mcg (50,000 unit) capsule  Generic drug: ergocalciferol  Take 1 capsule (50,000 Units total) by mouth every 7 days. for 12 doses           * This list has 5 medication(s) that are the same as other medications  prescribed for you. Read the directions carefully, and ask your doctor or other care provider to review them with you.                ASK your doctor about these medications      ammonium lactate 12 % Crea  Apply 1 gramas directed topically Daily.     azelastine 137 mcg (0.1 %) nasal spray  Commonly known as: ASTELIN  1 spray 2 (two) times daily.     ciclopirox 8 % Soln  Commonly known as: PENLAC  APPLY EXTERNALLY TO TOENAILS ONCE DAILY     * clotrimazole 1 % cream  Commonly known as: LOTRIMIN  Apply topically 2 (two) times daily.     * clotrimazole 1 % Soln  Commonly known as: LOTRIMIN  Apply between the toes two times a day.     fluticasone propionate 50 mcg/actuation nasal spray  Commonly known as: FLONASE  Fluticasone Propionate 50 MCG/ACT Nasal Suspension QTY: 16 gram Days: 30 Refills: 5  Written: 09/22/22 Patient Instructions: 2 sprays in each nostril once a day prn allergy symptoms           * This list has 2 medication(s) that are the same as other medications prescribed for you. Read the directions carefully, and ask your doctor or other care provider to review them with you.                    Keila Johnson MD  General Surgery  [facility] - Surgery

## 2025-02-10 ENCOUNTER — PATIENT OUTREACH (OUTPATIENT)
Dept: ADMINISTRATIVE | Facility: CLINIC | Age: 40
End: 2025-02-10
Payer: COMMERCIAL

## 2025-02-10 ENCOUNTER — PATIENT MESSAGE (OUTPATIENT)
Dept: BARIATRICS | Facility: CLINIC | Age: 40
End: 2025-02-10
Payer: COMMERCIAL

## 2025-02-10 NOTE — ANESTHESIA POSTPROCEDURE EVALUATION
Anesthesia Post Evaluation    Patient: Melissa Carvalho    Procedure(s) Performed: Procedure(s) (LRB):  XI ROBOTIC GASTROENTEROSTOMY, MADI-EN-Y with intra op EGD (N/A)  BLOCK, TRANSVERSUS ABDOMINIS PLANE  XI ROBOTIC REPAIR, HERNIA, HIATAL    Final Anesthesia Type: general      Patient location during evaluation: PACU  Patient participation: Yes- Able to Participate  Level of consciousness: awake and alert  Post-procedure vital signs: reviewed and stable  Pain management: adequate  Airway patency: patent    PONV status at discharge: No PONV  Anesthetic complications: no      Cardiovascular status: blood pressure returned to baseline  Respiratory status: unassisted  Hydration status: euvolemic  Follow-up not needed.              Vitals Value Taken Time   /71 02/08/25 1538   Temp 36.8 °C (98.2 °F) 02/08/25 1538   Pulse 75 02/08/25 1538   Resp 18 02/08/25 1538   SpO2 99 % 02/08/25 1538         Event Time   Out of Recovery 17:30:00         Pain/Mike Score: Pain Rating Prior to Med Admin: 2 (2/8/2025  3:34 PM)

## 2025-02-14 ENCOUNTER — CLINICAL SUPPORT (OUTPATIENT)
Dept: BARIATRICS | Facility: CLINIC | Age: 40
End: 2025-02-14
Payer: COMMERCIAL

## 2025-02-14 DIAGNOSIS — Z71.3 DIETARY COUNSELING AND SURVEILLANCE: Primary | ICD-10-CM

## 2025-02-14 NOTE — PROGRESS NOTES
Audio Only Telehealth Visit     The patient location is: home ([place])  The chief complaint leading to consultation is: s/p bariatric sx  Visit type: Virtual visit with audio only (telephone)  Total time spent in medical discussion with patient: 15 minutes  Total time spent on date of the encounter:15 minutes       The reason for the audio only service rather than synchronous audio and video virtual visit was related to technical difficulties or patient preference/necessity.       Each patient to whom I provide medical services by telemedicine is:  (1) informed of the relationship between the physician and patient and the respective role of any other health care provider with respect to management of the patient; and (2) notified that they may decline to receive medical services by telemedicine and may withdraw from such care at any time. Patient verbally consented to receive this service via voice-only telephone call.       NUTRITION NOTE    Referring Physician: Laurie Hector M.D.   Reason for MNT Referral: Follow-up 1 Week s/p laproscopic Rios-en-Y gastric bypass    CURRENT DIET:  Bariatric Liquid Diet    Dehydration assessment:  Urine output/color: normal  Chest pain:n  Persistent increased heart rate:n  Fatigue: n  Dizzy/weak: n  N/V: n  BM: y    Protein and fluid intake assessment: (food diary)    Fluids include: variety  Fluid intake yesterday: 64floz+  Protein supplements: Premier shakes 2day  Protein intake yesterday: 60g    Vitamins  Liquid multivitamin with iron  Liquid Calcium Citrate with vitamin D    B-1 250 mg liquid weekly  B-12 sublingual 2500 mcg weekly    Medicatio  Omeprazole: no but will start now and take daily  How are you tolerating pain at this time? (rate on a scale from 1 to 10; >7 notify PA/MD): 3-5  Did you take your acetaminophen and gabapentin for 3 days? y  Did you have to take additional acetaminophen for break through pain (pain scale 4-6)? y  Did you have any severe pain that required  oxycodone?  No. If yes, how much did you use and do you have left? Full bottle    How is the support system at home? good  Exercise reminder (light exercise at this time, no lifting above 10 lbs)     Questions for nurse/MA/PA: no    BARIATRIC DIET DISCUSSION:  Reinforced post-op nutrition guidelines.  Continue to work on fluid and protein intake.    PLAN/RECOMMONDATIONS:  Continue bariatric high protein liquid diet.  Maintain protein intake or increase gradually to goal of 80 g prot/day.  Maintain fluid intake or increase gradually to goal of 64 floz/day.  Begin appropriate vitamins & minerals.  Begin or continue light exercise.     Confirmed date and time for 2 week po labs and clinic visit     SESSION TIME: 15 minutes                          This service was not originating from a related E/M service provided within the previous 7 days nor will  to an E/M service or procedure within the next 24 hours or my soonest available appointment.  Prevailing standard of care was able to be met in this audio-only visit.

## 2025-02-21 ENCOUNTER — OFFICE VISIT (OUTPATIENT)
Dept: BARIATRICS | Facility: CLINIC | Age: 40
End: 2025-02-21
Payer: COMMERCIAL

## 2025-02-21 ENCOUNTER — LAB VISIT (OUTPATIENT)
Dept: LAB | Facility: HOSPITAL | Age: 40
End: 2025-02-21
Payer: COMMERCIAL

## 2025-02-21 ENCOUNTER — CLINICAL SUPPORT (OUTPATIENT)
Dept: BARIATRICS | Facility: CLINIC | Age: 40
End: 2025-02-21
Payer: COMMERCIAL

## 2025-02-21 ENCOUNTER — PATIENT MESSAGE (OUTPATIENT)
Dept: PRIMARY CARE CLINIC | Facility: CLINIC | Age: 40
End: 2025-02-21
Payer: COMMERCIAL

## 2025-02-21 VITALS
OXYGEN SATURATION: 100 % | DIASTOLIC BLOOD PRESSURE: 72 MMHG | BODY MASS INDEX: 40.25 KG/M2 | HEART RATE: 84 BPM | WEIGHT: 213.19 LBS | HEIGHT: 61 IN | SYSTOLIC BLOOD PRESSURE: 120 MMHG | TEMPERATURE: 99 F

## 2025-02-21 DIAGNOSIS — E11.69 HYPERLIPIDEMIA ASSOCIATED WITH TYPE 2 DIABETES MELLITUS: ICD-10-CM

## 2025-02-21 DIAGNOSIS — I15.2 HYPERTENSION ASSOCIATED WITH TYPE 2 DIABETES MELLITUS: ICD-10-CM

## 2025-02-21 DIAGNOSIS — E78.5 HYPERLIPIDEMIA ASSOCIATED WITH TYPE 2 DIABETES MELLITUS: ICD-10-CM

## 2025-02-21 DIAGNOSIS — Z98.84 S/P BARIATRIC SURGERY: ICD-10-CM

## 2025-02-21 DIAGNOSIS — E11.59 HYPERTENSION ASSOCIATED WITH TYPE 2 DIABETES MELLITUS: ICD-10-CM

## 2025-02-21 DIAGNOSIS — E11.9 TYPE 2 DIABETES MELLITUS WITHOUT COMPLICATION, WITHOUT LONG-TERM CURRENT USE OF INSULIN: ICD-10-CM

## 2025-02-21 DIAGNOSIS — Z71.3 DIETARY COUNSELING: Primary | ICD-10-CM

## 2025-02-21 DIAGNOSIS — G47.33 OSA ON CPAP: ICD-10-CM

## 2025-02-21 DIAGNOSIS — E66.01 MORBID OBESITY WITH BMI OF 40.0-44.9, ADULT: ICD-10-CM

## 2025-02-21 DIAGNOSIS — E66.01 MORBID OBESITY: ICD-10-CM

## 2025-02-21 DIAGNOSIS — Z98.84 S/P BARIATRIC SURGERY: Primary | ICD-10-CM

## 2025-02-21 LAB
ALBUMIN SERPL BCP-MCNC: 3.2 G/DL (ref 3.5–5.2)
ALP SERPL-CCNC: 43 U/L (ref 40–150)
ALT SERPL W/O P-5'-P-CCNC: 18 U/L (ref 10–44)
ANION GAP SERPL CALC-SCNC: 7 MMOL/L (ref 8–16)
AST SERPL-CCNC: 21 U/L (ref 10–40)
BASOPHILS # BLD AUTO: 0.03 K/UL (ref 0–0.2)
BASOPHILS NFR BLD: 0.6 % (ref 0–1.9)
BILIRUB SERPL-MCNC: 0.3 MG/DL (ref 0.1–1)
BUN SERPL-MCNC: 10 MG/DL (ref 6–20)
CALCIUM SERPL-MCNC: 8.9 MG/DL (ref 8.7–10.5)
CHLORIDE SERPL-SCNC: 109 MMOL/L (ref 95–110)
CO2 SERPL-SCNC: 24 MMOL/L (ref 23–29)
CREAT SERPL-MCNC: 0.8 MG/DL (ref 0.5–1.4)
DIFFERENTIAL METHOD BLD: ABNORMAL
EOSINOPHIL # BLD AUTO: 0.1 K/UL (ref 0–0.5)
EOSINOPHIL NFR BLD: 1.4 % (ref 0–8)
ERYTHROCYTE [DISTWIDTH] IN BLOOD BY AUTOMATED COUNT: 15.3 % (ref 11.5–14.5)
EST. GFR  (NO RACE VARIABLE): >60 ML/MIN/1.73 M^2
GLUCOSE SERPL-MCNC: 79 MG/DL (ref 70–110)
HCT VFR BLD AUTO: 32.4 % (ref 37–48.5)
HGB BLD-MCNC: 10.2 G/DL (ref 12–16)
IMM GRANULOCYTES # BLD AUTO: 0.01 K/UL (ref 0–0.04)
IMM GRANULOCYTES NFR BLD AUTO: 0.2 % (ref 0–0.5)
LYMPHOCYTES # BLD AUTO: 1.5 K/UL (ref 1–4.8)
LYMPHOCYTES NFR BLD: 30.6 % (ref 18–48)
MCH RBC QN AUTO: 28.1 PG (ref 27–31)
MCHC RBC AUTO-ENTMCNC: 31.5 G/DL (ref 32–36)
MCV RBC AUTO: 89 FL (ref 82–98)
MONOCYTES # BLD AUTO: 0.6 K/UL (ref 0.3–1)
MONOCYTES NFR BLD: 11.3 % (ref 4–15)
NEUTROPHILS # BLD AUTO: 2.7 K/UL (ref 1.8–7.7)
NEUTROPHILS NFR BLD: 55.9 % (ref 38–73)
NRBC BLD-RTO: 0 /100 WBC
PLATELET # BLD AUTO: 243 K/UL (ref 150–450)
PMV BLD AUTO: 10.7 FL (ref 9.2–12.9)
POTASSIUM SERPL-SCNC: 3.5 MMOL/L (ref 3.5–5.1)
PROT SERPL-MCNC: 6.6 G/DL (ref 6–8.4)
RBC # BLD AUTO: 3.63 M/UL (ref 4–5.4)
SODIUM SERPL-SCNC: 140 MMOL/L (ref 136–145)
VIT B12 SERPL-MCNC: 1311 PG/ML (ref 210–950)
WBC # BLD AUTO: 4.87 K/UL (ref 3.9–12.7)

## 2025-02-21 PROCEDURE — 80053 COMPREHEN METABOLIC PANEL: CPT | Performed by: NURSE PRACTITIONER

## 2025-02-21 PROCEDURE — 99999 PR PBB SHADOW E&M-EST. PATIENT-LVL I: CPT | Mod: PBBFAC,,, | Performed by: DIETITIAN, REGISTERED

## 2025-02-21 PROCEDURE — 85025 COMPLETE CBC W/AUTO DIFF WBC: CPT | Performed by: NURSE PRACTITIONER

## 2025-02-21 PROCEDURE — 82607 VITAMIN B-12: CPT | Performed by: NURSE PRACTITIONER

## 2025-02-21 PROCEDURE — 84425 ASSAY OF VITAMIN B-1: CPT | Performed by: NURSE PRACTITIONER

## 2025-02-21 PROCEDURE — 97803 MED NUTRITION INDIV SUBSEQ: CPT | Mod: S$GLB,,, | Performed by: DIETITIAN, REGISTERED

## 2025-02-21 NOTE — PROGRESS NOTES
NUTRITION NOTE    Referring Physician: Dr. Hector  Reason for MNT Referral: Follow-up 2 Weeks s/p Gastric Bypass    Denies nausea, vomiting, constipation, and diarrhea.  Reports doing well.    Past Medical History:   Diagnosis Date    Diabetes mellitus     Heart murmur     Hypertension     Miscarriage     x 2 at 4 weeks GA     CLINICAL DATA:  39 y.o. female.    CURRENT DIET:  Bariatric Liquid Diet    Diet Recall: 80 grams of protein/day; 64 oz of fluids/day    Diet Includes:   Protein Supplements: Premier shakes 2/day and Protein 2.0 (20g prot) 1/day    EXERCISE:  Walking daily    Restrictions to Exercise: None.    VITAMINS/MINERALS:  Bariatric Pal chewable one/day  Liquid Calcium Citrate with vitamin D    B-12 sublingual 2500 mcg weekly    ASSESSMENT:  Doing well overall.  Adequate protein intake.  Adequate fluid intake.    BARIATRIC DIET DISCUSSION:  Instructed and provided written materials on bariatric pureed and soft diet plans.  Reinforced post-op nutrition guidelines.    PLAN/RECOMMONDATIONS:  Advance to bariatric pureed diet.  May advance to bariatric soft diet plan in 2 weeks as saranya.  Maintain protein intake.  Maintain fluid intake.  Continue light exercise.  Continue appropriate vitamins & minerals.    Return to clinic in 6 weeks.    SESSION TIME: 15 minutes

## 2025-02-21 NOTE — PATIENT INSTRUCTIONS
Phase 2: Bariatric High Protein Pureed Diet  Weeks 3 & 4    Two weeks after surgery, you may be ready to add pureed foods to your diet.  All food should be the consistency of baby food, or thinner.      Protein First   It is very important to pay attention to protein intake during this phase. Meeting protein needs daily will help increase healing, decrease muscle loss, and increase weight loss. Inadequate protein intake can cause delayed wound healing, hair loss, and muscle break down.    Always eat the foods with the highest protein first (see list below)  Eat 3-5 small meals per day (2-4 tbsp each), with protein supplements in between to meet protein needs.  Lean meats and seafood can be pureed with a small amount of liquid, such as broth, in a  or  to baby food consistency. No lumps, bumps or stringy bits.    Follow the pureed diet for the next two weeks.    Daily Goals   grams of protein a day through protein supplements and food  64 oz of fluids per day from water, sugar-free, non-carbonated drinks, and sugar-free popsicles, and sugar-free Jell-O    Tip:  Use a food journal or the [employer] ynes to track how much you are eating and drinking. Below are Phase 2 foods, portion sizes and grams (g) of protein:    Foods Allowed on Pureed Diet Portion size Protein (g)   Lean meats, poultry, fish, seafood, pureed 1 oz 6-9   Scrambled eggs or Egg Beaters 1 or ¼ cup 6   Beans (red, white, black, lima, coleman, fat-free refried, hummus) and lentils, soft-cooked ¼ cup 4   Greek yogurt (under 10 g total sugar) 5 oz 12-15   Low-fat/fat-free cheese (cottage cheese, mozzarella string cheese, ricotta cheese, Baby Bell, cheddar, etc.) 1 oz 7-8   Edamame or tofu, mashed ¼ cup 5   98% fat-free cream soups (add unflavored protein powder, pureed meat/vegetables if desired) ½ cup 1-2   Sugar-free pudding (add protein or pb powder if desired) ½ cup 0-1   Peanut butter powder* 2 Tbsp 5   * Peanut butter powder  contains about 60 calories per serving. Regular peanut butter is 190 calories per serving. Peanut butter powder is a better alternative and is sold in the same section as regular peanut butter.   Protein drinks will make up most of your protein for the day.  Protein drinks should contain 20-30 g protein and no more than 4 grams of sugar per serving.  Always have protein when you eat.  Sip fluids in between meals.  Do not drink with meals, 30 minutes before, or 30 minutes after meals.   Mashed potatoes, grits, and oatmeal ARE NOT part of the pureed diet    *If food feels stuck, do not try to push it down with fluids; walk around*    Keep Doing the Following  Taking your vitamins - multivitamin with iron, calcium citrate with Vit D, B-1 or Super B-Complex, and B-12 (include Vit A if you had HENRY-S surgery)  Physical Activity/Light Exercise (remember not to lift, carry, pull, push, anything over 10 lbs)   Tracking your food, drink, and exercise with the [employer] ynes or on paper  Coming to the bariatric clinic for follow-up appointments and lab work      Bariatric Phase 2 Pureed Sample Menu    Below is a sample Phase 2 schedule and menu. Note, you should eat or drink every hour and ensure you are adhering to 30-30-30 rule: No fluids 30 minutes before eating, spend 30 mins eating, wait 30 minutes before drinking fluids. Adjust this schedule according to your day.     Timing Item Protein (g) Calories   8am-8:30am 1 egg or ¼ cup Egg Beaters 6 78   9am-9:30am 1 cup water or sugar-free drink 0 0-15   10am-10:30am Protein shake 30 160   11am-11:30am 1 cup water or sugar-free drink 0 0-15   12pm-12:30pm 1 mozzarella string cheese 7 80   1pm-1:30pm 1 cup water or sugar-free drink  0 0-15   2pm-2:30pm Protein shake 30 160   3pm-3:30pm 1 cup water or sugar-free drink  0 0-15   4pm-4:30pm ¼ container nonfat plain Greek yogurt with 1 tbsp pureeed peaches 4 28   5pm-5:30pm 1 cup water or sugar-free drink 0 0-15   6pm-6:30pm ¼  cup mashed beans 4 112   7pm-7:30pm 1 cup water or sugar-free drink 0 0-15   8pm-8:30pm ¼ sugar free pudding cup mixed with 1 tsp peanut butter powder 1 17   Total  [phone]     High Protein Recipes for the Pureed Diet    High Protein Creamy Chicken Soup  (10g protein per 1 cup serving)    Empty 1 can of 98% fat free cream of chicken soup into saucepan. In a bowl, blend 1 scoop of unflavored protein powder with 1 can of skim milk until smooth.  Add protein milk to saucepan and heat to warm. (Note: Do NOT boil. Protein powder may clump if heated too hot).     High Protein Pudding  (14g protein per ½ cup serving)    Add 2 scoops protein powder to 2 cups cold skim milk and mix well.  Stir in dry Jell-O Sugar-Free Instant Pudding mix.  Chill and Enjoy!    Chocolate Peanut Butter Mousse  (14g protein per 3 oz serving)    Mix 6 oz plain Greek yogurt with 4 tbsp chocolate PB2    [place] Chicken Dip  Adapted from: https://www.bariatricfoodcoach.com/buffalo-chicken-salad-wls-recipe/  Ingredients:  2 cups canned chicken breast, drained  1/4 cup light melendez   1/2 tsp onion powder  3 tbsp buffalo sauce/hot sauce    Directions:  Mix all of the ingredients in a  or   Puree until smooth  Season to taste    Nutrition Facts:  Serving size: 2 oz; # Servings: 4  Calories 104; Total Fat 3 g; Saturated Fat 1 g; Trans Fat 1 g ; Cholesterol 62 mg; Sodium 541 mg; Total Carbs 1 g; Fiber 1 g; Sugar 1 g; Protein 17 g

## 2025-02-21 NOTE — PROGRESS NOTES
BARIATRIC POST-OPERATIVE VISIT:    HPI:  Melissa Carvalho is a 39 y.o. year old female presents for 2 week post op visit following LRNY.  she is doing well and tolerating the diet without difficulty.  she has no complaints.  Denies: nausea, vomiting, abdominal pain, changes in bowel movement pattern, fever, chills, dysphagia, chest pain, and shortness of breath.    Review of Systems   Constitutional:  Negative for activity change and fatigue.   Respiratory:  Negative for cough and shortness of breath.    Cardiovascular:  Negative for chest pain, palpitations and leg swelling.   Gastrointestinal:  Negative for abdominal pain, nausea and vomiting.   Endocrine: Negative for polydipsia, polyphagia and polyuria.   Genitourinary:  Negative for dysuria.   Musculoskeletal:  Negative for gait problem.   Skin:  Negative for rash.   Allergic/Immunologic: Negative for immunocompromised state.   Neurological:  Negative for dizziness, syncope and weakness.   Hematological:  Does not bruise/bleed easily.   Psychiatric/Behavioral:  Negative for behavioral problems.        EXERCISE & VITAMINS:  See Bariatric Assessment    MEDICATIONS/ALLERGIES:  Have been reviewed.    DIET: Liquid Bariatric Diet.  2 protein shakes daily, ~80 grams protein.  64 fl oz SF clear beverage.  See Dietician note from today for a more detailed assessment.      Physical Exam  Vitals and nursing note reviewed.   Constitutional:       Appearance: She is well-developed. She is morbidly obese.   HENT:      Head: Normocephalic.      Nose: Nose normal.      Mouth/Throat:      Mouth: Mucous membranes are moist.   Eyes:      Extraocular Movements: Extraocular movements intact.   Cardiovascular:      Rate and Rhythm: Normal rate and regular rhythm.      Heart sounds: Normal heart sounds.   Pulmonary:      Effort: Pulmonary effort is normal.      Breath sounds: Normal breath sounds.   Abdominal:      General: Bowel sounds are normal.      Palpations: Abdomen is soft.    Musculoskeletal:         General: Normal range of motion.      Cervical back: Normal range of motion.   Skin:     General: Skin is warm and dry.      Capillary Refill: Capillary refill takes less than 2 seconds.   Neurological:      Mental Status: She is alert and oriented to person, place, and time.   Psychiatric:         Mood and Affect: Mood normal.       ASSESSMENT:  - Morbid obesity s/p laparoscopic Rios-en-Y on 2/7/25.  - Co-morbidities: diabetes mellitus, dyslipidemia, hypertension, and obstructive sleep apnea  -  Weight loss, 9#'s and 10% EWL  -  Exercise routine walking 20 mins   - Good Diet  - Good Vitamin regimen    PLAN:  - Ursodiol 500 mg daily for 6 months  - Anti-Acid medication,  daily for 3 months  - No lifting more than 10 lbs for 6 weeks  - Miralax daily for constipation  - Emphasized the importance of regular exercise and adherence to bariatric diet to achieve maximum weight loss.  - Encouraged patient to start regular exercise.  - Follow-up with dietician to advance diet.  - Continue daily vitamins and medications.  - RTC in 6 weeks or sooner if needed.  - Call the office for any issues.  - Check labs today.    Post Discharge     2 Weeks     Total Opioids Used (Outpatient): not give rx at d/c

## 2025-02-27 LAB — VIT B1 BLD-MCNC: 54 UG/L (ref 38–122)

## 2025-03-05 ENCOUNTER — OFFICE VISIT (OUTPATIENT)
Dept: OPTOMETRY | Facility: CLINIC | Age: 40
End: 2025-03-05
Payer: COMMERCIAL

## 2025-03-05 DIAGNOSIS — E11.9 TYPE 2 DIABETES MELLITUS WITHOUT COMPLICATION, WITH LONG-TERM CURRENT USE OF INSULIN: ICD-10-CM

## 2025-03-05 DIAGNOSIS — E11.9 TYPE 2 DIABETES MELLITUS WITHOUT RETINOPATHY: Primary | ICD-10-CM

## 2025-03-05 DIAGNOSIS — Z79.4 TYPE 2 DIABETES MELLITUS WITHOUT COMPLICATION, WITH LONG-TERM CURRENT USE OF INSULIN: ICD-10-CM

## 2025-03-05 PROCEDURE — 92004 COMPRE OPH EXAM NEW PT 1/>: CPT | Mod: S$GLB,,, | Performed by: OPTOMETRIST

## 2025-03-05 PROCEDURE — 1159F MED LIST DOCD IN RCRD: CPT | Mod: CPTII,S$GLB,, | Performed by: OPTOMETRIST

## 2025-03-05 PROCEDURE — 2023F DILAT RTA XM W/O RTNOPTHY: CPT | Mod: CPTII,S$GLB,, | Performed by: OPTOMETRIST

## 2025-03-05 PROCEDURE — 99999 PR PBB SHADOW E&M-EST. PATIENT-LVL IV: CPT | Mod: PBBFAC,,, | Performed by: OPTOMETRIST

## 2025-03-05 PROCEDURE — 92015 DETERMINE REFRACTIVE STATE: CPT | Mod: S$GLB,,, | Performed by: OPTOMETRIST

## 2025-03-05 NOTE — PROGRESS NOTES
REYES VARELA: New patient  Last DFE: -  Chief complaint (CC): 39 yr old in today for Diabetic eye exam follow past   her eye exam at [facility] . Optometrist saw some things that was   concerning.   Glasses? Yes  Contacts? No  H/o eye surgery, injections or laser: No  H/o eye injury: No  Known eye conditions? Astigmatism   Family h/o eye conditions? No  Eye gtts? Systane drops      (-) Flashes (-)  Floaters (-) Mucous   (-)  Tearing (-) Itching (-) Burning   (-) Headaches (-) Eye Pain/discomfort (-) Irritation   (-)  Redness (-) Double vision (+) Blurry vision    CL Exam:Yes  Current CL Brand: -  Rx OD     OS               Wears full-time or part-time:  Full time/Part Time     Sleeps with contact lenses:  Yes/No     CL Solution used:     How often replace CLs:      Any problem with VA with CLs?  Yes/No     Diabetic? Yes  A1c? Lab Results       Component                Value               Date                       HGBA1C                   5.8 (H)             12/17/2024              Last edited by Harmony Pleitez on 3/5/2025  8:35 AM.            Assessment /Plan     For exam results, see Encounter Report.    Type 2 diabetes mellitus without retinopathy    Type 2 diabetes mellitus without complication, with long-term current use of insulin  -     Ambulatory referral/consult to Ophthalmology      MONITOR. ED PT ON ALL EXAM FINDINGS  RX FINAL SPECS   OCULAR HEALTH WNL OD, OS   TYPE 2 DM W/O RETINOPATHY OU; CONTINUE W/ PCP FOR GLYCEMIC CONTROL; MONITOR.   RTC 1 YR//PRN FOR MARINA

## 2025-03-10 ENCOUNTER — PATIENT MESSAGE (OUTPATIENT)
Dept: BARIATRICS | Facility: CLINIC | Age: 40
End: 2025-03-10
Payer: COMMERCIAL

## 2025-03-20 ENCOUNTER — PATIENT MESSAGE (OUTPATIENT)
Dept: PRIMARY CARE CLINIC | Facility: CLINIC | Age: 40
End: 2025-03-20
Payer: COMMERCIAL

## 2025-03-21 ENCOUNTER — PATIENT MESSAGE (OUTPATIENT)
Dept: PODIATRY | Facility: CLINIC | Age: 40
End: 2025-03-21
Payer: COMMERCIAL

## 2025-03-26 ENCOUNTER — TELEPHONE (OUTPATIENT)
Dept: PRIMARY CARE CLINIC | Facility: CLINIC | Age: 40
End: 2025-03-26
Payer: COMMERCIAL

## 2025-03-26 NOTE — TELEPHONE ENCOUNTER
"Please contact patient.  Please let her know I sent her Carista App message to follow up on her diabetes and her recovery from recent surgery.  I got a notification that she has not read the message    "      Ananda Carvalho     How are you feeling with your recovery?     How are your glucose levels off of Mounjaro? Can you send me a few readings?     Dr. Ina Worthy D.O.   Family Medicine      "

## 2025-03-26 NOTE — TELEPHONE ENCOUNTER
Spoke with pt she claims she is doing well and she is concerned about her blood pressure she states that it jumped from 124/99 to 104/102 I advised her to check her MyChart

## 2025-03-28 ENCOUNTER — OFFICE VISIT (OUTPATIENT)
Dept: PODIATRY | Facility: CLINIC | Age: 40
End: 2025-03-28
Payer: COMMERCIAL

## 2025-03-28 VITALS — BODY MASS INDEX: 36.66 KG/M2 | WEIGHT: 194 LBS

## 2025-03-28 DIAGNOSIS — E11.9 ENCOUNTER FOR DIABETIC FOOT EXAM: ICD-10-CM

## 2025-03-28 DIAGNOSIS — Z79.4 TYPE 2 DIABETES MELLITUS WITHOUT COMPLICATION, WITH LONG-TERM CURRENT USE OF INSULIN: Primary | ICD-10-CM

## 2025-03-28 DIAGNOSIS — B35.3 TINEA PEDIS OF BOTH FEET: ICD-10-CM

## 2025-03-28 DIAGNOSIS — E11.9 TYPE 2 DIABETES MELLITUS WITHOUT COMPLICATION, WITH LONG-TERM CURRENT USE OF INSULIN: Primary | ICD-10-CM

## 2025-03-28 PROCEDURE — 99999 PR PBB SHADOW E&M-EST. PATIENT-LVL IV: CPT | Mod: PBBFAC,,, | Performed by: PODIATRIST

## 2025-03-28 RX ORDER — TERBINAFINE HYDROCHLORIDE 250 MG/1
250 TABLET ORAL DAILY
Qty: 14 TABLET | Refills: 0 | Status: SHIPPED | OUTPATIENT
Start: 2025-03-28 | End: 2025-04-11

## 2025-03-28 RX ORDER — CLOTRIMAZOLE 1 G/ML
SOLUTION TOPICAL DAILY
Qty: 30 ML | Refills: 2 | Status: SHIPPED | OUTPATIENT
Start: 2025-03-28

## 2025-03-28 NOTE — PROGRESS NOTES
Subjective:      Patient ID: Melissa Carvalho is a 39 y.o. female.    Chief Complaint:   Nail Care and Diabetic Foot Exam    Melissa is a 39 y.o. female who presents to the clinic for evaluation and treatment of high risk feet. Melissa has a past medical history of Diabetes mellitus, Heart murmur, Hypertension, and Miscarriage.    Patient here for diabetic foot exam and f/u amm lac      Patient doing well  Have itching between the toes  Requesting more of the solution that she use previously  Wearing black socks to work feels that it possibly increases the sweat    Recently had weight loss surgery doing well      PCP: Ina Worthy, DO    Date Last Seen by PCP: 10/10/24    Current shoe gear:  Affected Foot: Casual shoes     Unaffected Foot: Casual shoes    Hemoglobin A1C   Date Value Ref Range Status   12/17/2024 5.8 (H) 4.0 - 5.6 % Final     Comment:     ADA Screening Guidelines:  5.7-6.4%  Consistent with prediabetes  >or=6.5%  Consistent with diabetes    High levels of fetal hemoglobin interfere with the HbA1C  assay. Heterozygous hemoglobin variants (HbS, HgC, etc)do  not significantly interfere with this assay.   However, presence of multiple variants may affect accuracy.     08/29/2024 5.9 (H) 4.0 - 5.6 % Final     Comment:     ADA Screening Guidelines:  5.7-6.4%  Consistent with prediabetes  >or=6.5%  Consistent with diabetes    High levels of fetal hemoglobin interfere with the HbA1C  assay. Heterozygous hemoglobin variants (HbS, HgC, etc)do  not significantly interfere with this assay.   However, presence of multiple variants may affect accuracy.     06/05/2024 6.0 (H) 4.7 - 5.6 % Final   08/09/2023 6.4 (H) 4.7 - 5.6 % Final     Hemoglobin A1c   Date Value Ref Range Status   02/22/2021 5.9 (H) <5.7 % of total Hgb Final     Comment:     For someone without known diabetes, a hemoglobin   A1c value between 5.7% and 6.4% is consistent with  prediabetes and should be confirmed with a   follow-up test.    For someone  with known diabetes, a value <7%  indicates that their diabetes is well controlled. A1c  targets should be individualized based on duration of  diabetes, age, comorbid conditions, and other  considerations.    This assay result is consistent with an increased risk  of diabetes.    Currently, no consensus exists regarding use of  hemoglobin A1c for diagnosis of diabetes for children.        Past Medical History:   Diagnosis Date    Diabetes mellitus     Heart murmur     Hypertension     Miscarriage     x 2 at 4 weeks GA     Past Surgical History:   Procedure Laterality Date    CARDIAC SURGERY      at age 7    INJECTION OF ANESTHETIC AGENT INTO TISSUE PLANE DEFINED BY TRANSVERSUS ABDOMINIS MUSCLE  2/7/2025    Procedure: BLOCK, TRANSVERSUS ABDOMINIS PLANE;  Surgeon: Laurie Hector MD;  Location: [facility] OR [address] [place] [address];  Service: General;;    NOSE SURGERY Bilateral 07/22/2022    [facility]    ROBOT-ASSISTED REPAIR OF HIATAL HERNIA USING DA JAIME XI  2/7/2025    Procedure: XI ROBOTIC REPAIR, HERNIA, HIATAL;  Surgeon: Laurie Hector MD;  Location: [facility] OR [address] [place] [address];  Service: General;;    XI ROBOTIC GASTROENTEROSTOMY, MADI-EN-Y N/A 2/7/2025    Procedure: XI ROBOTIC GASTROENTEROSTOMY, MADI-EN-Y with intra op EGD;  Surgeon: Laurie Hector MD;  Location: [facility] OR [address] [place] [address];  Service: General;  Laterality: N/A;  Surgeon to perform tap block intraoperatively     Current Outpatient Medications on File Prior to Visit   Medication Sig Dispense Refill    ammonium lactate 12 % Crea Apply 1 gramas directed topically Daily. 140 g 1    atorvastatin (LIPITOR) 40 MG tablet Take 1 tablet (40 mg total) by mouth once daily. 90 tablet 3    azelastine (ASTELIN) 137 mcg (0.1 %) nasal spray 1 spray 2 (two) times daily.      blood sugar diagnostic (TRUE METRIX GLUCOSE TEST STRIP) Strp Use strip to test blood sugar once daily 100 each 5    blood sugar diagnostic Strp Check blood sugar every day 100 each 5    blood sugar diagnostic Strp Use to check sugar once per  day.      blood-glucose meter Misc Use to check sugar.      carvediloL (COREG) 25 MG tablet Take 1 tablet (25 mg total) by mouth 2 (two) times daily with meals. 180 tablet 3    cetirizine (ZYRTEC) 10 MG tablet Take 1 tablet (10 mg total) by mouth once daily. 90 tablet 3    cetirizine (ZYRTEC) 10 MG tablet Oral for 30 Days      chlorthalidone (HYGROTEN) 25 MG Tab Take 1 tablet (25 mg total) by mouth once daily. 90 tablet 3    ciclopirox (PENLAC) 8 % Soln       fluticasone propionate (FLONASE) 50 mcg/actuation nasal spray       gabapentin (NEURONTIN) 300 MG capsule OPEN capsule into a tablespoon and consume with sip of liquid. Take once the MORNING OF SURGERY. After surgery: take one capsule TWICE DAILY for at least 3 days and up to 5 days as needed for pain. 11 capsule 0    lancets Misc Use to check sugar once per day.      multivitamin (THERAGRAN) per tablet Take 1 tablet by mouth once daily.      norethindrone (MICRONOR) 0.35 mg tablet Take 1 tablet (0.35 mg total) by mouth once daily. 28 tablet 12    omeprazole (PRILOSEC) 40 MG capsule Take 1 capsule (40 mg total) by mouth every morning. Open capsule and take with apple sauce 30 capsule 2    ondansetron (ZOFRAN-ODT) 8 MG TbDL Dissolve 1 tablet (8 mg total) by mouth every 6 (six) hours as needed (Nausea). 30 tablet 0    promethazine (PHENERGAN) 12.5 MG Supp Place 1 suppository (12.5 mg total) rectally every 6 (six) hours as needed (nausea, 2nd line). 5 suppository 0    sacubitriL-valsartan (ENTRESTO)  mg per tablet Take one tablet by mouth twice daily. Stop taking amlodipine 180 tablet 2    tirzepatide 7.5 mg/0.5 mL PnIj Inject 7.5 mg into the skin every 7 days. 6 mL 3    ursodioL (TODD FORTE) 500 MG tablet Take 1 tablet (500 mg total) by mouth once daily. For gallstone prevention. 180 tablet 0    [DISCONTINUED] clotrimazole (LOTRIMIN) 1 % cream Apply topically 2 (two) times daily.      [DISCONTINUED] clotrimazole (LOTRIMIN) 1 % Soln Apply between the toes  two times a day. 30 mL 2     No current facility-administered medications on file prior to visit.     Review of patient's allergies indicates:  No Known Allergies    Review of Systems   Constitutional: Positive for weight loss. Negative for chills, decreased appetite, fever, malaise/fatigue, night sweats and weight gain.   Cardiovascular:  Positive for leg swelling. Negative for chest pain, claudication, dyspnea on exertion, palpitations and syncope.   Respiratory:  Negative for cough and shortness of breath.    Endocrine: Negative for cold intolerance and heat intolerance.   Hematologic/Lymphatic: Negative for bleeding problem. Does not bruise/bleed easily.   Skin:  Positive for dry skin, itching and nail changes. Negative for color change, flushing, poor wound healing, rash, skin cancer, suspicious lesions and unusual hair distribution.   Musculoskeletal:  Negative for arthritis, back pain, falls, gout, joint pain, joint swelling, muscle cramps, muscle weakness, myalgias, neck pain and stiffness.   Gastrointestinal:  Negative for diarrhea, nausea and vomiting.   Neurological:  Negative for dizziness, focal weakness, light-headedness, numbness, paresthesias, tremors, vertigo and weakness.   Psychiatric/Behavioral:  Negative for altered mental status and depression. The patient does not have insomnia.    Allergic/Immunologic: Negative.            Objective:       Vitals:    03/28/25 0751   Weight: 88 kg (194 lb 0.1 oz)   PainSc: 0-No pain   88 kg (194 lb 0.1 oz)     Physical Exam  Vitals reviewed.   Constitutional:       General: She is not in acute distress.     Appearance: She is well-developed. She is not ill-appearing, toxic-appearing or diaphoretic.      Comments: Proper supportive shoegear      Cardiovascular:      Pulses:           Dorsalis pedis pulses are 2+ on the right side and 2+ on the left side.        Posterior tibial pulses are 1+ on the right side and 1+ on the left side.   Musculoskeletal:          General: No tenderness or deformity.      Right lower le+ Edema present.      Left lower le+ Edema present.      Right ankle: Normal.      Right Achilles Tendon: Normal. No tenderness.      Left ankle: Normal.      Left Achilles Tendon: Normal. No tenderness.      Right foot: Decreased range of motion. No deformity, tenderness or bony tenderness.      Left foot: Decreased range of motion. No deformity, tenderness or bony tenderness.      Comments: Flexible pes planus foot type w/ medial arch collapse and mild gastroc equinus       Reducible extensor and flexor contractures at the MTPJ and PIPJ of toes 2-5, bilat.          Feet:      Right foot:      Protective Sensation: 10 sites tested.  10 sites sensed.      Skin integrity: Dry skin present. No ulcer, blister, skin breakdown, erythema, warmth or callus.      Toenail Condition: Right toenails are abnormally thick.      Left foot:      Protective Sensation: 10 sites tested.  10 sites sensed.      Skin integrity: Dry skin present. No ulcer, blister, skin breakdown, erythema, warmth or callus.      Toenail Condition: Left toenails are abnormally thick.      Comments: SWM intact    - IS macerations mild peeling       Skin:     General: Skin is warm and dry.      Capillary Refill: Capillary refill takes 2 to 3 seconds.      Coloration: Skin is not pale.      Findings: No erythema or rash.   Neurological:      Mental Status: She is alert and oriented to person, place, and time.   Psychiatric:         Attention and Perception: Attention normal.         Mood and Affect: Mood normal.         Speech: Speech normal.         Behavior: Behavior normal.         Thought Content: Thought content normal.         Cognition and Memory: Cognition normal.         Judgment: Judgment normal.               Assessment:       Encounter Diagnoses   Name Primary?    Type 2 diabetes mellitus without complication, with long-term current use of insulin Yes    Encounter for diabetic foot  exam     Tinea pedis of both feet            Plan:       Melissa was seen today for nail care and diabetic foot exam.    Diagnoses and all orders for this visit:    Type 2 diabetes mellitus without complication, with long-term current use of insulin    Encounter for diabetic foot exam    Tinea pedis of both feet    Other orders  -     clotrimazole (LOTRIMIN) 1 % Soln; Apply topically Daily.  -     terbinafine HCL (LAMISIL) 250 mg tablet; Take 1 tablet (250 mg total) by mouth once daily. for 14 days        I counseled the patient on her conditions, their implications and medical management.        - Shoe inspection. Diabetic Foot Education. Patient reminded of the importance of good nutrition and blood sugar control to help prevent podiatric complications of diabetes. Patient instructed on proper foot hygeine. We discussed wearing proper shoe gear, daily foot inspections, never walking without protective shoe gear, never putting sharp instruments to feet, routine podiatric nail visits every 2-3 months.       DM foot exam     Refill of the antifungal solution    Discussed copper socks      LFTs normal recommend 2 weeks oral Lamisil.  Will ask at next postop bariatric appointment      Follow-up yearly sooner if needed

## 2025-04-04 ENCOUNTER — LAB VISIT (OUTPATIENT)
Dept: LAB | Facility: HOSPITAL | Age: 40
End: 2025-04-04
Payer: COMMERCIAL

## 2025-04-04 ENCOUNTER — CLINICAL SUPPORT (OUTPATIENT)
Dept: BARIATRICS | Facility: CLINIC | Age: 40
End: 2025-04-04
Payer: COMMERCIAL

## 2025-04-04 ENCOUNTER — OFFICE VISIT (OUTPATIENT)
Dept: BARIATRICS | Facility: CLINIC | Age: 40
End: 2025-04-04
Payer: COMMERCIAL

## 2025-04-04 VITALS
BODY MASS INDEX: 35.63 KG/M2 | OXYGEN SATURATION: 98 % | DIASTOLIC BLOOD PRESSURE: 70 MMHG | WEIGHT: 188.69 LBS | SYSTOLIC BLOOD PRESSURE: 118 MMHG | HEIGHT: 61 IN | HEART RATE: 76 BPM

## 2025-04-04 DIAGNOSIS — E11.69 HYPERLIPIDEMIA ASSOCIATED WITH TYPE 2 DIABETES MELLITUS: ICD-10-CM

## 2025-04-04 DIAGNOSIS — E78.5 HYPERLIPIDEMIA ASSOCIATED WITH TYPE 2 DIABETES MELLITUS: ICD-10-CM

## 2025-04-04 DIAGNOSIS — I15.2 HYPERTENSION ASSOCIATED WITH TYPE 2 DIABETES MELLITUS: ICD-10-CM

## 2025-04-04 DIAGNOSIS — E66.9 OBESITY (BMI 30-39.9): ICD-10-CM

## 2025-04-04 DIAGNOSIS — E66.01 MORBID OBESITY: ICD-10-CM

## 2025-04-04 DIAGNOSIS — E11.59 HYPERTENSION ASSOCIATED WITH TYPE 2 DIABETES MELLITUS: ICD-10-CM

## 2025-04-04 DIAGNOSIS — Z98.84 S/P BARIATRIC SURGERY: ICD-10-CM

## 2025-04-04 DIAGNOSIS — Z71.3 DIETARY COUNSELING: Primary | ICD-10-CM

## 2025-04-04 DIAGNOSIS — G47.33 OSA ON CPAP: ICD-10-CM

## 2025-04-04 DIAGNOSIS — Z98.84 S/P BARIATRIC SURGERY: Primary | ICD-10-CM

## 2025-04-04 DIAGNOSIS — E11.9 TYPE 2 DIABETES MELLITUS WITHOUT COMPLICATION, WITHOUT LONG-TERM CURRENT USE OF INSULIN: ICD-10-CM

## 2025-04-04 LAB
25(OH)D3+25(OH)D2 SERPL-MCNC: 33 NG/ML (ref 30–96)
ABSOLUTE EOSINOPHIL (OHS): 0.04 K/UL
ABSOLUTE MONOCYTE (OHS): 0.53 K/UL (ref 0.3–1)
ABSOLUTE NEUTROPHIL COUNT (OHS): 1.54 K/UL (ref 1.8–7.7)
ALBUMIN SERPL BCP-MCNC: 4 G/DL (ref 3.5–5.2)
ALP SERPL-CCNC: 51 UNIT/L (ref 40–150)
ALT SERPL W/O P-5'-P-CCNC: 19 UNIT/L (ref 10–44)
ANION GAP (OHS): 10 MMOL/L (ref 8–16)
AST SERPL-CCNC: 19 UNIT/L (ref 11–45)
BASOPHILS # BLD AUTO: 0.03 K/UL
BASOPHILS NFR BLD AUTO: 0.7 %
BILIRUB SERPL-MCNC: 0.5 MG/DL (ref 0.1–1)
BUN SERPL-MCNC: 17 MG/DL (ref 6–20)
CALCIUM SERPL-MCNC: 9.9 MG/DL (ref 8.7–10.5)
CHLORIDE SERPL-SCNC: 98 MMOL/L (ref 95–110)
CHOLEST SERPL-MCNC: 161 MG/DL (ref 120–199)
CHOLEST/HDLC SERPL: 5 {RATIO} (ref 2–5)
CO2 SERPL-SCNC: 25 MMOL/L (ref 23–29)
CREAT SERPL-MCNC: 1.2 MG/DL (ref 0.5–1.4)
ERYTHROCYTE [DISTWIDTH] IN BLOOD BY AUTOMATED COUNT: 15.7 % (ref 11.5–14.5)
GFR SERPLBLD CREATININE-BSD FMLA CKD-EPI: 60 ML/MIN/1.73/M2
GLUCOSE SERPL-MCNC: 100 MG/DL (ref 70–110)
HCT VFR BLD AUTO: 39.6 % (ref 37–48.5)
HDLC SERPL-MCNC: 32 MG/DL (ref 40–75)
HDLC SERPL: 19.9 % (ref 20–50)
HGB BLD-MCNC: 12.6 GM/DL (ref 12–16)
IMM GRANULOCYTES # BLD AUTO: 0 K/UL (ref 0–0.04)
IMM GRANULOCYTES NFR BLD AUTO: 0 % (ref 0–0.5)
IRON SATN MFR SERPL: 19 % (ref 20–50)
IRON SERPL-MCNC: 53 UG/DL (ref 30–160)
LDLC SERPL CALC-MCNC: 109.8 MG/DL (ref 63–159)
LYMPHOCYTES # BLD AUTO: 2.28 K/UL (ref 1–4.8)
MCH RBC QN AUTO: 27 PG (ref 27–31)
MCHC RBC AUTO-ENTMCNC: 31.8 G/DL (ref 32–36)
MCV RBC AUTO: 85 FL (ref 82–98)
NONHDLC SERPL-MCNC: 129 MG/DL
NUCLEATED RBC (/100WBC) (OHS): 0 /100 WBC
PLATELET # BLD AUTO: 254 K/UL (ref 150–450)
PMV BLD AUTO: 12.9 FL (ref 9.2–12.9)
POTASSIUM SERPL-SCNC: 3.8 MMOL/L (ref 3.5–5.1)
PROT SERPL-MCNC: 7.7 GM/DL (ref 6–8.4)
RBC # BLD AUTO: 4.67 M/UL (ref 4–5.4)
RELATIVE EOSINOPHIL (OHS): 0.9 %
RELATIVE LYMPHOCYTE (OHS): 51.6 % (ref 18–48)
RELATIVE MONOCYTE (OHS): 12 % (ref 4–15)
RELATIVE NEUTROPHIL (OHS): 34.8 % (ref 38–73)
SODIUM SERPL-SCNC: 133 MMOL/L (ref 136–145)
TIBC SERPL-MCNC: 281 UG/DL (ref 250–450)
TRANSFERRIN SERPL-MCNC: 190 MG/DL (ref 200–375)
TRIGL SERPL-MCNC: 96 MG/DL (ref 30–150)
VIT B12 SERPL-MCNC: 1470 PG/ML (ref 210–950)
WBC # BLD AUTO: 4.42 K/UL (ref 3.9–12.7)

## 2025-04-04 PROCEDURE — 85025 COMPLETE CBC W/AUTO DIFF WBC: CPT

## 2025-04-04 PROCEDURE — 84425 ASSAY OF VITAMIN B-1: CPT

## 2025-04-04 PROCEDURE — 80061 LIPID PANEL: CPT

## 2025-04-04 PROCEDURE — 80053 COMPREHEN METABOLIC PANEL: CPT

## 2025-04-04 PROCEDURE — 99999 PR PBB SHADOW E&M-EST. PATIENT-LVL V: CPT | Mod: PBBFAC,,, | Performed by: NURSE PRACTITIONER

## 2025-04-04 PROCEDURE — 99999 PR PBB SHADOW E&M-EST. PATIENT-LVL I: CPT | Mod: PBBFAC,,, | Performed by: DIETITIAN, REGISTERED

## 2025-04-04 PROCEDURE — 83540 ASSAY OF IRON: CPT

## 2025-04-04 PROCEDURE — 36415 COLL VENOUS BLD VENIPUNCTURE: CPT | Mod: PO

## 2025-04-04 PROCEDURE — 82306 VITAMIN D 25 HYDROXY: CPT

## 2025-04-04 PROCEDURE — 82607 VITAMIN B-12: CPT

## 2025-04-04 NOTE — PROGRESS NOTES
NUTRITION NOTE    Referring Physician: Dr. Hector  Reason for MNT Referral: Follow-up 8 weeks s/p Gastric Bypass    Denies nausea, vomiting, constipation, and diarrhea.  Reports doing well.    Past Medical History:   Diagnosis Date    Diabetes mellitus     Heart murmur     Hypertension     Miscarriage     x 2 at 4 weeks GA     CLINICAL DATA:  39 y.o. female.    Current Weight: 188 lbs  BMI: 35.6  Total Weight Loss: 34 lbs  Excess Weight Loss: 37%    LABS:  Reviewed.    CURRENT DIET:  Bariatric Diet.  Diet Recall:  grams of protein/day; 48 oz of fluids/day    B: prot powder 30g + 4oz Fairlife 7g  L: prot powder 30g + 4oz Fairlife 7g  - oikos triple zero greek yogurt 15g  D: 1 can chicken noodle soup (only ate about 2-3 bites of noodles)  - a few grapes and a slice of cucumber peeled    Diet includes:  Meal Pattern: 2 meal(s) + 1 snack(s) + 2 protein supplement(s)  Adequate protein supplement intake.  Adequate dairy intake.  Adequate vegetable intake. Tolerated cucumber. Tried Naomi's caesar salad, but it got stuck.   Adequate fruit intake.  Starchy CHO: avoids/limits    EXERCISE:  Walking the block 15-20 min, every other day    Restrictions to Exercise: None.    VITAMINS / MINERALS:  Bariatric Pal chewable one/day  Liquid Calcium Citrate with vitamin D    B-12 sublingual 2500 mcg weekly    ASSESSMENT:  Doing well overall.  Weight loss.  Adequate calorie intake.  Adequate protein intake.  Adequate fluid intake.  Following diet appropriately.  Exercising.  Adequate vitamins & minerals.    BARIATRIC DIET DISCUSSION:  Instructed and provided written materials on bariatric diet plan.  Reinforced post-op nutrition guidelines.    PLAN / RECOMMENDATIONS:  May begin to incorporate raw vegetables, lettuce, unsalted nuts, and protein bars as tolerated.  Continue excellent diet plan.  Maintain protein intake.  Maintain fluid intake.  Continue exercise.  Continue appropriate vitamins & minerals.    Return to clinic in 4  months.    SESSION TIME: 30 minutes

## 2025-04-04 NOTE — PATIENT INSTRUCTIONS
Meal Ideas for Regular Bariatric Diet  *Recipes and products available at [email or web address]      Breakfast: (15-20g protein)    - Egg white omelet: 2 egg whites or ½ cup Egg Beaters. (Optional proteins: cheese, shrimp, black beans, chicken, sliced turkey) (Optional veggies: tomatoes, salsa, spinach, mushrooms, onions, green peppers, or small slice avocado)     - Egg and sausage: 1 egg or ¼ cup Egg Beaters (any variety), with 1 brian or 2 links of Turkey sausage or Veggie breakfast sausage (CommonFloor or Health Wildcatters)    - Crust-less breakfast quiche: To make a glass pie dish, mix 4oz part skim Ricotta, 1 cup skim milk, and 2 eggs as your base. Add protein: shredded cheese, sliced lean ham or turkey, turkey machado/sausage. Add veggies: tomato, onion, green onion, mushroom, green pepper, spinach, etc.    - Yogurt parfait: Mix 1 - 6oz container Dannon Light N Fit vanilla yogurt, with ¼ cup crushed unsalted nuts    - Cottage cheese and fruit: ½ cup part-skim cottage cheese or ricotta cheese topped with fresh fruit or sugar free preserves     - Myesha Ko's Vanilla Egg custard* (add 2 Tbsp instant coffee granules to make Cappuccino Custard*)    - Hi-Protein café latte (skim milk, decaf coffee, 1 scoop protein powder). Optional to add Sugar free syrup or extract flavoring.    - Breakfast Lox: spread fat free cream cheese on slices of smoked salmon. Serve over scrambled or egg over easy (sauteed with nonstick cookspray) OR on a cucumber slice    - Eggwhich: Scramble or cook 1 large egg over easy using nonstick cookspray. Place between 2 slices of [place] machado and low fat cheese.     Lunch: (20-30g protein)    - ½ cup Black bean soup (Homemade or Progresso), with ¼ cup shredded low-fat cheese. Top with chopped tomato or fresh salsa.     - Lean deli turkey breast and low-fat sliced cheese, mustard or light melendez to moisten, rolled up together, or wrapped in a Ramon lettuce leaf    - Chicken salad made from dinner  leftovers, moisten with low-fat salad dressing or light melendez. Also try leftover salmon, shrimp, tuna or boiled eggs. Serve ½ cup over dark green salad    - Fat-free canned refried beans, topped with ¼ cup shredded low-fat cheese. Top with chopped tomato or fresh salsa.     - Greek salad: Top mixed greens with 1-2oz grilled chicken, tomatoes, red onions, 2-3 kalamata olives, and sprinkle lightly with feta cheese. Spritz with Balsamic vinegar to taste.     - Crust-less lunch quiche: To make a glass pie dish, mix 4oz part skim Ricotta, 1 cup skim milk, and 2 eggs as your base. Add protein: shredded cheese, sliced lean ham or turkey, shrimp, chicken. Add veggies: tomato, onion, green onion, mushroom, green pepper, spinach, artichoke, broccoli, etc.    - Pizza bake: spread a  tamie martha mushroom with tomato sauce, low-fat shredded mozzarella and turkey pepperoni or Delta machado. Add any veggies. Roast for 10-15 minutes, until cheese melted.     - Cucumber crab bites: Spread ¼ cup crab dip (lump crabmeat + light cream cheese and green onions) over sliced cucumber.     - Chicken with light spinach and artichoke dip*: Puree in : 6oz cooked and drained spinach, 2 cloves garlic, 1 can cannelloni beans, ½ cup chopped green onions, 1 can drained artichoke hearts (not marinated in oil), lemon juice and basil. Mix in 2oz chopped up chicken.    Supper: (20-30g protein)    - Serve grilled fish over dark green salad tossed with low-fat dressing, served with grilled asparagus mendes     - Rotisserie chicken salad: served with sliced strawberries, walnuts, fat-free feta cheese crumbles and 1 tbsp Daviss Own Light Raspberry Steger Vinaigrette    - Shrimp cocktail: Dip cold boiled shrimp in homemade low-sugar cocktail sauce (1/2 cup Monty One Carb ketchup, 2 tbsp horseradish, 1/4 tsp hot sauce, 1 tsp Worcestershire sauce, 1 tbsp freshly-squeezed lemon juice). Serve with dark green salad, walnuts, and crumbled blue  cheese drizzled with olive oil and Balsamic vinegar    - Tuna Melt: Spread tuna salad onto 2 thick slices of tomato. Top with low-fat cheese and broil until cheese is melted. May also be made with chicken salad of shrimp salad. Boutte with different types of cheeses.    - Chicken or beef fajitas (no tortilla, rice, beans, chips). Top meat and veggies w/ fresh salsa, fat free sour cream.     - Homemade low-fat Chili using extra lean ground beef or ground turkey. Top with shredded cheese and salsa as desired. May add dollop fat-free sour cream if desired    - Chicken parmesan: Top chicken breast w/ low sugar marinara sauce, mozzarella cheese and bake until chicken reaches 165*.  Serve w/ spaghetti SQUASH or Guinean cut green beans    - Dinner Omelet with shrimp or chicken and onion, green peppers and chives.    - No noodle lasagna: Use sliced zucchini or eggplant in place of noodles.  Layer with part skim ricotta cheese and low sugar meat sauce (use very lean ground beef or ground turkey).    - Mexican chicken bake: Bake chunks of chicken breast or thigh with taco seasoning, Pace brand enchilada sauce, green onions and low-fat cheese. Serve with ¼ cup black beans or fat free refried beans topped with chopped tomatoes or salsa.    - Farooq frozen meatballs, simmered in Classico Marinara sauce. Different flavors of salsa or spaghetti sauce create different dishes! Sprinkle with parmesan cheese. Serve with grilled or steamed veggies, or a dark green salad.    - Simmer boneless skinless chicken thigh chunks in Classico Marinara sauce or roasted salsa until tender with chopped onion, bell pepper, garlic, mushrooms, spinach, etc.     - Hamburger or veggie burger, without the bun, dressed the way you like. Served with grilled or steamed veggies.    - Eggplant parmesan: Bake slices of eggplant at 350 degrees for 15 minutes. Layer tomato sauce, sliced eggplant and low-fat mozzarella cheese in a baking dish and cover with  foil. Bake 30-40 more minutes or until bubbly. Uncover and bake at 400 degrees for about 15 more minutes, or until top is slightly crisp.    - Fish tacos: grilled/baked white fish, wrapped in Ramon lettuce leaf, topped with salsa, shredded low-fat cheese, and light coleslaw.    - Chicken slime: Sprinkle chicken w/ 1 tsp of hidden valley ranch dip mix. Then grill chicken and top with black beans, salsa and 1 tsp fat free sour cream.     - Cauliflower pizza crust: Use cauliflower as crust (see recipe on pinterest, no flour!). Top w/ low fat cheese, turkey pepperoni and veggies and bake again    - chicken or turkey crust pizza: use ground chicken or turkey instead of cauliflower, spread in [place] and bake at 350 for about 20-30 minutes(may want to add garlic, black pepper, oregano and other herbs to ground meat mixture).  Remove and top w/ low fat cheese, turkey pepperoni and veggies and bake again for another 10 minutes or until cheese is browned.     Snacks: (100-200 calories; >5g protein)    - 1 low-fat cheese stick with 8 cherry tomatoes or 1 serving fresh fruit  - 4 thin slices fat-free turkey breast and 1 slice low-fat cheese  - 4 thin slices fat-free honey ham with wedge of melon  - 6-8 edamame pods (equivalent to about 1/4 cup edamame without pods).   - 1/4 cup unsalted nuts with ½ cup fruit  - 6-oz container Dannon Light n Fit vanilla yogurt, topped with 1oz unsalted nuts         - apple, celery or baby carrots spread with 2 Tbsp PB2  - apple slices with 1 oz slice low-fat cheese  - Apple slices dipped in 2 Tbsp of PB2  - celery, cucumber, bell pepper or baby carrots dipped in ¼ cup hummus bean spread or light spinach and artichoke dip (*recipe in lunch section)  - celery, cucumber, baby carrots dipped in high protein greek yogurt (Mix 16 oz plain greek yogurt + 1 packet of hidden valley ranch dip mix)  - Edward Links Beef Steak - 14g protein! (similar to beef jerky)  - 2 wedges Laughing Cow - Light Herb  & Garlic Cheese with sliced cucumber or green bell pepper  - 1/2 cup low-fat cottage cheese with ¼ cup fruit or ¼ cup salsa  - RTD Protein drinks: Atkins, Low Carb Slim Fast, EAS light, Muscle Milk Light, etc.  - Homemade Protein drinks: GNC Soy95, Isopure, Nectar, UNJURY, Whey Gourmet, etc. Mix 1 scoop powder with 8oz skim/1% milk or light soymilk.  - Protein bars: Atkins, EAS, Pure Protein, Think Thin, Detour, etc. Must have 0-4 grams sugar - Read the label.    Takeout Options: No more than twice/week  Deli - Salads (no pasta or rice), meats, cheeses. Roasted chicken. Lox (salmon)    Mexican - Platters which don't include tortillas, chips, or rice. Go easy on the beans. Example: Fajitas without the tortillas. Ask the  not to bring chips to the table if they are too tempting.    Greek - Meat or fish and vegetable, but no bread or rice. Including hummus, baba ganoush, etc, is OK. Most sit-down Greek restaurants can provide you with cucumber slices for dipping instead of vipin bread.    Fast Food (Avoid as much as possible) - Salads (no croutons and limit salad dressing to 2 tbsp), grilled chicken sandwich without the bun and ask for no melendez. Naomis low fat chili or Taco Bell pintos and cheese.    BBQ - The meats are fine if you ask for sauces on the side, but most of the traditional side dishes are loaded with carbs. Erick slaw, baked beans and BBQ sauce are typically made with sugar.    Chinese - Nothing deep-fried, no rice or noodles. Many Chinese sauces have starch and sugar in them, so you'll have to use your judgement. If you find that these sauces trigger cravings, or cause Dumping, you can ask for the sauce to be made without sugar or just use soy sauce.     How to taper off of Omeprazole:  - Take 1 Tablet every other day for 2 weeks.  If you do not experience any heartburn, indigestion, nausea symptoms, the following week, take 1 tablet every 3 days.  Again if you remain without the above symptoms, you  may completely discontinue the medication.  If symptoms return at any point, please restart the medication.

## 2025-04-04 NOTE — PROGRESS NOTES
BARIATRIC POST-OPERATIVE VISIT:    HPI:  Melissa Carvalho is a 39 y.o. year old female presents for 8 week post op visit following LRNY.  she is doing well and tolerating the diet without difficulty.  she has no complaints.    Denies: nausea, vomiting, abdominal pain, changes in bowel movement pattern, fever, chills, dysphagia, chest pain, and shortness of breath.    Review of Systems   Constitutional:  Negative for activity change and fatigue.   Respiratory:  Negative for cough and shortness of breath.    Cardiovascular:  Negative for chest pain, palpitations and leg swelling.   Gastrointestinal:  Negative for abdominal pain, nausea and vomiting.   Endocrine: Negative for polydipsia, polyphagia and polyuria.   Genitourinary:  Negative for dysuria.   Musculoskeletal:  Negative for gait problem.   Skin:  Negative for rash.   Allergic/Immunologic: Negative for immunocompromised state.   Neurological:  Negative for dizziness, syncope and weakness.   Hematological:  Does not bruise/bleed easily.   Psychiatric/Behavioral:  Negative for behavioral problems.        EXERCISE & VITAMINS:  See Bariatric Assessment    MEDICATIONS/ALLERGIES:  Have been reviewed.    DIET: Soft/Regular Bariatric Diet.  2 protein shakes daily, ~80 grams protein.  64+ fl oz SF clear beverage.        See Dietician note from today for a more detailed assessment.      Physical Exam  Vitals and nursing note reviewed.   Constitutional:       Appearance: She is well-developed. She is morbidly obese.   HENT:      Head: Normocephalic.      Nose: Nose normal.      Mouth/Throat:      Mouth: Mucous membranes are moist.   Eyes:      Extraocular Movements: Extraocular movements intact.   Cardiovascular:      Rate and Rhythm: Normal rate and regular rhythm.      Heart sounds: Normal heart sounds.   Pulmonary:      Effort: Pulmonary effort is normal.      Breath sounds: Normal breath sounds.   Abdominal:      General: Bowel sounds are normal.      Palpations:  Abdomen is soft.   Musculoskeletal:         General: Normal range of motion.      Cervical back: Normal range of motion.   Skin:     General: Skin is warm and dry.      Capillary Refill: Capillary refill takes less than 2 seconds.   Neurological:      Mental Status: She is alert and oriented to person, place, and time.   Psychiatric:         Mood and Affect: Mood normal.       ASSESSMENT:  - Morbid obesity s/p laparoscopic [id number]-Y on 2/7/25.  - Co-morbidities: diabetes mellitus, dyslipidemia, hypertension, and obstructive sleep apnea  -  Weight loss,34#'s and 37% EWL  -  Exercise routine walking 20 mins   - Good Diet  - Good Vitamin regimen     PLAN:  - Ursodiol 500 mg daily for 6 months  - Anti-Acid medication, daily for 3 months, slow taper  - Miralax daily for constipation  - Emphasized the importance of regular exercise and adherence to bariatric diet to achieve maximum weight loss.  - Encouraged patient to start regular exercise.  - Follow-up with dietician to advance diet.  - Continue daily vitamins and medications.  - RTC in 4 months or sooner if needed.  - Call the office for any issues.  - Check labs today.  - Discussed with PCP to decrease HTN meds     Post Discharge     8 Weeks     Total Opioids Used (Outpatient): not give rx at d/c

## 2025-04-07 ENCOUNTER — PATIENT MESSAGE (OUTPATIENT)
Dept: PRIMARY CARE CLINIC | Facility: CLINIC | Age: 40
End: 2025-04-07
Payer: COMMERCIAL

## 2025-04-07 ENCOUNTER — PATIENT MESSAGE (OUTPATIENT)
Dept: CARDIOLOGY | Facility: CLINIC | Age: 40
End: 2025-04-07
Payer: COMMERCIAL

## 2025-04-08 ENCOUNTER — PATIENT MESSAGE (OUTPATIENT)
Dept: BARIATRICS | Facility: CLINIC | Age: 40
End: 2025-04-08
Payer: COMMERCIAL

## 2025-04-09 LAB — W VITAMIN B1: 43 UG/L

## 2025-05-13 ENCOUNTER — PATIENT MESSAGE (OUTPATIENT)
Dept: BARIATRICS | Facility: CLINIC | Age: 40
End: 2025-05-13
Payer: COMMERCIAL

## 2025-06-03 ENCOUNTER — PATIENT MESSAGE (OUTPATIENT)
Dept: BARIATRICS | Facility: CLINIC | Age: 40
End: 2025-06-03
Payer: COMMERCIAL

## 2025-06-03 ENCOUNTER — PATIENT OUTREACH (OUTPATIENT)
Dept: ADMINISTRATIVE | Facility: HOSPITAL | Age: 40
End: 2025-06-03
Payer: COMMERCIAL

## 2025-06-11 ENCOUNTER — RESULTS FOLLOW-UP (OUTPATIENT)
Dept: PRIMARY CARE CLINIC | Facility: CLINIC | Age: 40
End: 2025-06-11

## 2025-06-11 PROBLEM — Z00.01 ENCOUNTER FOR GENERAL ADULT MEDICAL EXAMINATION WITH ABNORMAL FINDINGS: Status: ACTIVE | Noted: 2025-06-11

## 2025-06-11 PROBLEM — E66.9 OBESITY: Status: RESOLVED | Noted: 2025-02-07 | Resolved: 2025-06-11

## 2025-06-11 NOTE — PROGRESS NOTES
FAMILY MEDICINE  [facility]    Reason for visit:   Chief Complaint   Patient presents with    Annual Exam    Diabetes    Hypertension         SUBJECTIVE: Melissa Carvalho is a 39 y.o. female  - with type 2 diabetes, hyperlipidemia, hypertension, PCOS, rEFHF and history of Rios-En-Y gastric bypass (02/07/2025) presents for for her routine annual physical and follow up type 2 diabetes and hypertension    Gynecology: Damaris Reyes MD  Podiatry Berta Burgos DPM  Cardiology: Jose Perez MD  Podiatry Berta Mendoza, DELMIS  Bariatric surgery Laurie Storey, Kiki Zartae, NP   Optometry: Debby Javed OD      Since her last visit Melissa Carvalho had a robotic assisted gastroenterostomy, Rios-en-Y with intra operative EGD and hiatal hernia repair on 02/07/2025.  She has recovered well.  She has lost 29 lbs since surgery but she was 237 lbs when she initially started her weight loss program so in total she lost 54 lbs    Wt Readings from Last 10 Encounters:  06/18/25 : 83.3 kg (183 lb 10.3 oz)  04/04/25 : 85.6 kg (188 lb 11.4 oz)  03/28/25 : 88 kg (194 lb 0.1 oz)  02/21/25 : 96.7 kg (213 lb 3 oz)  02/07/25 : 96.2 kg (212 lb 1.3 oz)  01/13/25 : 101.1 kg (222 lb 14.2 oz)  12/18/24 : 103 kg (227 lb)  12/18/24 : 103.2 kg (227 lb 8.2 oz)  12/03/24 : 105.4 kg (232 lb 5.8 oz)  11/27/24 : 104.9 kg (231 lb 6 oz)    She reports that she is feeling great. She is tolerating PO. She is exercising.  He has including strength training.  She recently added core stabilization.  She walks on the treadmill and does daily walks.  She is pleased.      She reports that after gastric bypass her surgeon shoulder to start tirzepatide until remain off of the medication.  She does have a glucose monitor.  She has not been monitoring regularly.    After surgery she also had episodes of hypotension as well as lightheadedness.  She reports that she discontinued her Entresto 97/103 mg daily.   She has a follow up with Cardiology in about 1 week.    1. Diabetes Type 2    Current treatment regimen:   Diet-controlled since gastric bypass    Prior medication:  tirzepatide 7.5 mg/0.5 mL PnIj, Inject 7.5 mg into the skin every 7 days., Disp: 6 mL, Rfl: 3  - discontinue prior to gastric bypass 02/07/2025    Side effects from treatment: NA  Complications of diabetes: without complications    Prior medication:   Metformin GI upset    Glucometer: Yes  Glucose monitoring: PRN    Lab Results       Component                Value               Date                       HGBA1C                   5.2                 06/11/2025              Lab Results       Component                Value               Date                       HGBA1C                   5.8 (H)             12/17/2024              Lab Results       Component                Value               Date                       HGBA1C                   5.9 (H)             08/29/2024              Diabetes Management Status    ARB/ACEI: yes  Statin: yes  Eye exam: 3/5/25  Foot exam: 06/18/2025    Comorbidities:   rEFHF, DM2      2. Congestive heart failure  - rEFF    Cardiologist: Jose Perez MD    Symptoms:  No chest pain, No shortness of breath, No dyspnea on exertion, No orthopnea, No paroxysmal nocturnal dyspnea, No edema, No palpitations, No syncope    Medications:   atorvastatin (LIPITOR) 40 MG tablet, Take 1 tablet (40 mg total) by mouth once daily., Disp: 90 tablet, Rfl: 3  carvediloL (COREG) 25 MG tablet, Take 1 tablet (25 mg total) by mouth 2 (two) times daily with meals., Disp: 180 tablet, Rfl: 3  chlorthalidone (HYGROTEN) 25 MG Tab, Take 1 tablet (25 mg total) by mouth once daily., Disp: 90 tablet, Rfl: 3    sacubitriL-valsartan (ENTRESTO)  mg per tablet, Take one tablet by mouth twice daily. Stop taking amlodipine, Disp: 180 tablet, Rfl: 2  - not taking due to hypotension and lightheadedness    ARB/ACEI: no - > previously on Entresto  but currently not taking.  Recommend restart at lower dose  BB: yes  Diuretic: yes    Last hospitalization for CHF:  none recently    Last Echo:  === Results for orders placed during the hospital encounter of 07/02/24 ===    Echo Saline Bubble? Yes    - Interpretation Summary -  ·  Left Ventricle: The left ventricle is mildly to moderately dilated. Mildly to moderately increased wall thickness. Unable to assess wall motion. There is moderately reduced systolic function with a visually estimated ejection fraction of 30 - 35%.  Technically difficult estimation of LVEF.  Echocontrast could help for more definitive evaluation.  There is indeterminate diastolic function.  ·  Mitral Valve: There is trace to mild regurgitation.  ·  Right Ventricle: Normal right ventricular cavity size. Systolic function is normal.  ·  Pericardium: There is a trivial effusion.  ·  No evidence of intracardiac shunt on bubble study.    Baseline weight: currently decreasing since weight loss surgery     3. Hyperlipidemia  - diabetes, hypertension, coronary artery disease, and CHF  - LDL goal < 70    Current treatment:  atorvastatin (LIPITOR) 40 MG tablet, Take 1 tablet (40 mg total) by mouth once daily., Disp: 90 tablet, Rfl: 3    Side effects from current treatment: none    Lab Results       Component                Value               Date                       CHOL                     161                 04/04/2025                 CHOL                     194                 08/29/2024                 CHOL                     182                 08/09/2023            Lab Results       Component                Value               Date                       HDL                      32 (L)              04/04/2025                 HDL                      32 (L)              08/29/2024                 HDL                      33 (L)              08/09/2023            Lab Results       Component                Value               Date                        LDLCALC                  109.8               04/04/2025                 LDLCALC                  129.6               08/29/2024                 LDLCALC                  103                 08/09/2023            Lab Results       Component                Value               Date                       TRIG                     96                  04/04/2025                 TRIG                     162 (H)             08/29/2024                 TRIG                     228 (H)             08/09/2023            Lab Results       Component                Value               Date                       CHOLHDL                  19.9 (L)            04/04/2025                 CHOLHDL                  16.5 (L)            08/29/2024                 CHOLHDL                  5.52 (H)            08/09/2023                              Review of Systems   All other systems reviewed and are negative.      HEALTH MAINTENANCE:   Health Maintenance   Topic Date Due    COVID-19 Vaccine (7 - 2024-25 season) 09/01/2024    Hemoglobin A1c  12/11/2025    Diabetic Eye Exam  03/05/2026    Diabetes Urine Screening  06/11/2026    Lipid Panel  06/11/2026    Foot Exam  06/18/2026    Cervical Cancer Screening  10/15/2029    TETANUS VACCINE  10/10/2034    RSV Vaccine (Age 60+ and Pregnant patients) (1 - 1-dose 75+ series) 10/27/2062    Hepatitis C Screening  Completed    Influenza Vaccine  Completed    HIV Screening  Completed    Pneumococcal Vaccines (Age 0-49)  Completed     Health Maintenance Topics with due status: Not Due       Topic Last Completion Date    TETANUS VACCINE 10/10/2024    Cervical Cancer Screening 10/15/2024    Diabetic Eye Exam 03/05/2025    Diabetes Urine Screening 06/11/2025    Lipid Panel 06/11/2025    Hemoglobin A1c 06/11/2025    Foot Exam 06/18/2025    RSV Vaccine (Age 60+ and Pregnant patients) Not Due     Health Maintenance Due   Topic Date Due    COVID-19 Vaccine (7 - 2024-25 season) 09/01/2024       HISTORY:    Past Medical History:   Diagnosis Date    Diabetes mellitus     Heart murmur     Hypertension     Miscarriage     x 2 at 4 weeks GA       Past Surgical History:   Procedure Laterality Date    CARDIAC SURGERY      at age 7    INJECTION OF ANESTHETIC AGENT INTO TISSUE PLANE DEFINED BY TRANSVERSUS ABDOMINIS MUSCLE  2/7/2025    Procedure: BLOCK, TRANSVERSUS ABDOMINIS PLANE;  Surgeon: Laurie Hector MD;  Location: [facility] OR [address] [place] [address];  Service: General;;    NOSE SURGERY Bilateral 07/22/2022    [facility] [place]    ROBOT-ASSISTED REPAIR OF HIATAL HERNIA USING DA JAIME XI  2/7/2025    Procedure: XI ROBOTIC REPAIR, HERNIA, HIATAL;  Surgeon: Laurie Hector MD;  Location: [facility] OR [address] [place] [address];  Service: General;;    XI ROBOTIC GASTROENTEROSTOMY, MADI-EN-Y N/A 2/7/2025    Procedure: XI ROBOTIC GASTROENTEROSTOMY, MADI-EN-Y with intra op EGD;  Surgeon: Laurie Hector MD;  Location: [facility] OR [address] [place] [address];  Service: General;  Laterality: N/A;  Surgeon to perform tap block intraoperatively       Family History   Problem Relation Name Age of Onset    Diabetes Mother      Hypertension Mother      Cancer Father      Drug abuse Brother      Breast cancer Neg Hx      Colon cancer Neg Hx      Ovarian cancer Neg Hx         Social History[1]    Social History     Social History Narrative    Single in a relationship. . Exercises regularly. No children. History of miscarriages       ALLERGIES:   Review of patient's allergies indicates:  No Known Allergies    MEDS:   Current Outpatient Medications on File Prior to Visit   Medication Sig Dispense Refill Last Dose/Taking    atorvastatin (LIPITOR) 40 MG tablet Take 1 tablet (40 mg total) by mouth once daily. 90 tablet 3 Taking    azelastine (ASTELIN) 137 mcg (0.1 %) nasal spray 1 spray 2 (two) times daily.   Taking    carvediloL (COREG) 25 MG tablet Take 1 tablet (25 mg total) by mouth 2 (two) times daily with meals. 180 tablet 3 Taking    cetirizine (ZYRTEC) 10 MG tablet Take 1 tablet (10 mg total) by  mouth once daily. 90 tablet 3 Taking    cetirizine (ZYRTEC) 10 MG tablet Oral for 30 Days   Taking    chlorthalidone (HYGROTEN) 25 MG Tab Take 1 tablet (25 mg total) by mouth once daily. 90 tablet 3 Taking    ciclopirox (PENLAC) 8 % Soln    Taking    fluticasone propionate (FLONASE) 50 mcg/actuation nasal spray    Taking    gabapentin (NEURONTIN) 300 MG capsule OPEN capsule into a tablespoon and consume with sip of liquid. Take once the MORNING OF SURGERY. After surgery: take one capsule TWICE DAILY for at least 3 days and up to 5 days as needed for pain. 11 capsule 0 Taking    lancets Misc Use to check sugar once per day.   Taking    multivitamin (THERAGRAN) per tablet Take 1 tablet by mouth once daily.   Taking    omeprazole (PRILOSEC) 40 MG capsule Take 1 capsule (40 mg total) by mouth every morning. Open capsule and take with apple sauce 30 capsule 2 Taking    ursodioL (TODD FORTE) 500 MG tablet Take 1 tablet (500 mg total) by mouth once daily. For gallstone prevention. 180 tablet 0 Taking    ammonium lactate 12 % Crea Apply 1 gramas directed topically Daily. 140 g 1     blood sugar diagnostic (TRUE METRIX GLUCOSE TEST STRIP) Strp Use strip to test blood sugar once daily 100 each 5     blood sugar diagnostic Strp Check blood sugar every day 100 each 5     blood sugar diagnostic Strp Use to check sugar once per day.       blood-glucose meter Misc Use to check sugar.       norethindrone (MICRONOR) 0.35 mg tablet Take 1 tablet (0.35 mg total) by mouth once daily. (Patient not taking: Reported on 6/18/2025) 28 tablet 12 Not Taking    [DISCONTINUED] clotrimazole (LOTRIMIN) 1 % Soln Apply topically Daily. 30 mL 2     [DISCONTINUED] ondansetron (ZOFRAN-ODT) 8 MG TbDL Dissolve 1 tablet (8 mg total) by mouth every 6 (six) hours as needed (Nausea). 30 tablet 0     [DISCONTINUED] promethazine (PHENERGAN) 12.5 MG Supp Place 1 suppository (12.5 mg total) rectally every 6 (six) hours as needed (nausea, 2nd line). 5  "suppository 0     [DISCONTINUED] sacubitriL-valsartan (ENTRESTO)  mg per tablet Take one tablet by mouth twice daily. Stop taking amlodipine (Patient not taking: Reported on 6/18/2025) 180 tablet 2 Not Taking    [DISCONTINUED] tirzepatide 7.5 mg/0.5 mL PnIj Inject 7.5 mg into the skin every 7 days. 6 mL 3          Vital signs:   Vitals:    06/18/25 0826   BP: 134/84   BP Location: Left arm   Patient Position: Sitting   Pulse: 75   Resp: 18   SpO2: 100%   Weight: 83.3 kg (183 lb 10.3 oz)   Height: 5' 1" (1.549 m)     Body mass index is 34.7 kg/m².    PHYSICAL EXAM:     Physical Exam  Vitals reviewed.   Constitutional:       General: She is not in acute distress.  HENT:      Head: Normocephalic and atraumatic.      Right Ear: Tympanic membrane and ear canal normal.      Left Ear: Tympanic membrane and ear canal normal.   Eyes:      General: No scleral icterus.     Conjunctiva/sclera: Conjunctivae normal.   Neck:      Thyroid: No thyromegaly.      Vascular: No carotid bruit.   Cardiovascular:      Rate and Rhythm: Normal rate and regular rhythm.      Pulses:           Dorsalis pedis pulses are 2+ on the right side and 2+ on the left side.        Posterior tibial pulses are 2+ on the right side and 2+ on the left side.      Heart sounds: Normal heart sounds. No murmur heard.     No friction rub. No gallop.   Pulmonary:      Effort: Pulmonary effort is normal.      Breath sounds: Normal breath sounds. No wheezing or rales.   Abdominal:      General: Bowel sounds are normal. There is no distension.      Palpations: Abdomen is soft.      Tenderness: There is no abdominal tenderness.   Musculoskeletal:      Cervical back: Normal range of motion and neck supple.      Right lower leg: No edema.      Left lower leg: No edema.   Feet:      Right foot:      Protective Sensation: 5 sites tested.  5 sites sensed.      Skin integrity: Skin integrity normal.      Left foot:      Protective Sensation: 5 sites tested.  5 sites " sensed.      Skin integrity: Skin integrity normal.   Lymphadenopathy:      Cervical: No cervical adenopathy.   Skin:     General: Skin is warm.      Capillary Refill: Capillary refill takes less than 2 seconds.   Neurological:      Mental Status: She is alert.             PERTINENT RESULTS:   No visits with results within 1 Week(s) from this visit.   Latest known visit with results is:   Lab Visit on 06/11/2025   Component Date Value Ref Range Status    Cholesterol Total 06/11/2025 164  120 - 199 mg/dL Final    The National Cholesterol Education Program (NCEP) has set the  following guidelines (reference ranges) for Cholesterol:  Optimal.....................<200 mg/dL  Borderline High.............200-239 mg/dL  High........................> or = 240 mg/dL    Triglyceride 06/11/2025 102  30 - 150 mg/dL Final    The National Cholesterol Education Program (NCEP) has set the  following guidelines (reference values) for triglycerides:  Normal......................<150 mg/dL  Borderline High.............150-199 mg/dL  High........................200-499 mg/dL    HDL Cholesterol 06/11/2025 35 (L)  40 - 75 mg/dL Final    The National Cholesterol Education Program (NCEP) has set the   following guidelines (reference values) for HDL Cholesterol:   Low...............<40 mg/dL   Optimal...........>60 mg/dL    LDL Cholesterol 06/11/2025 108.6  63.0 - 159.0 mg/dL Final    The National Cholesterol Education Program (NCEP) has set the  following guidelines (reference values) for LDL Cholesterol:  Optimal.......................<130 mg/dL  Borderline High...............130-159 mg/dL  High..........................160-189 mg/dL  Very High.....................>190 mg/dL  LDL calculated using the Friedewald equation.    HDL/Cholesterol Ratio 06/11/2025 21.3  20.0 - 50.0 % Final    Cholesterol/HDL Ratio 06/11/2025 4.7  2.0 - 5.0 Final    Non HDL Cholesterol 06/11/2025 129  mg/dL Final    Risk category and Non-HDL cholesterol  goals:  Coronary heart disease (CHD)or equivalent (10-year risk of CHD >20%):  Non-HDL cholesterol goal     <130 mg/dL  Two or more CHD risk factors and 10-year risk of CHD <= 20%:  Non-HDL cholesterol goal     <160 mg/dL  0 to 1 CHD risk factor:  Non-HDL cholesterol goal     <190 mg/dL    Hemoglobin A1c 06/11/2025 5.2  4.0 - 5.6 % Final    ADA Screening Guidelines:  5.7-6.4%  Consistent with prediabetes  >=6.5%  Consistent with diabetes    High levels of fetal hemoglobin interfere with the HbA1C  assay. Heterozygous hemoglobin variants (HbS, HgC, etc)do  not significantly interfere with this assay.   However, presence of multiple variants may affect accuracy.    Estimated Average Glucose 06/11/2025 103  68 - 131 mg/dL Final    Sodium 06/11/2025 144  136 - 145 mmol/L Final    Potassium 06/11/2025 4.6  3.5 - 5.1 mmol/L Final    Chloride 06/11/2025 110  95 - 110 mmol/L Final    CO2 06/11/2025 23  23 - 29 mmol/L Final    Glucose 06/11/2025 88  70 - 110 mg/dL Final    BUN 06/11/2025 8  6 - 20 mg/dL Final    Creatinine 06/11/2025 0.9  0.5 - 1.4 mg/dL Final    Calcium 06/11/2025 9.5  8.7 - 10.5 mg/dL Final    Protein Total 06/11/2025 7.3  6.0 - 8.4 gm/dL Final    Albumin 06/11/2025 3.9  3.5 - 5.2 g/dL Final    Bilirubin Total 06/11/2025 0.4  0.1 - 1.0 mg/dL Final    For infants and newborns, interpretation of results should be based   on gestational age, weight and in agreement with clinical   observations.    Premature Infant recommended reference ranges:   0-24 hours:  <8.0 mg/dL   24-48 hours: <12.0 mg/dL   3-5 days:    <15.0 mg/dL   6-29 days:   <15.0 mg/dL    ALP 06/11/2025 50  40 - 150 unit/L Final    AST 06/11/2025 19  11 - 45 unit/L Final    ALT 06/11/2025 12  10 - 44 unit/L Final    Anion Gap 06/11/2025 11  8 - 16 mmol/L Final    eGFR 06/11/2025 >60  >60 mL/min/1.73/m2 Final    Estimated GFR calculated using the CKD-EPI creatinine (2021) equation.    WBC 06/11/2025 5.18  3.90 - 12.70 K/uL Final    RBC 06/11/2025  4.35  4.00 - 5.40 M/uL Final    HGB 06/11/2025 11.7 (L)  12.0 - 16.0 gm/dL Final    HCT 06/11/2025 37.6  37.0 - 48.5 % Final    MCV 06/11/2025 86  82 - 98 fL Final    MCH 06/11/2025 26.9 (L)  27.0 - 31.0 pg Final    MCHC 06/11/2025 31.1 (L)  32.0 - 36.0 g/dL Final    RDW 06/11/2025 17.7 (H)  11.5 - 14.5 % Final    Platelet Count 06/11/2025 289  150 - 450 K/uL Final    MPV 06/11/2025 11.5  9.2 - 12.9 fL Final    Nucleated RBC 06/11/2025 0  <=0 /100 WBC Final    Neut % 06/11/2025 43.4  38 - 73 % Final    Lymph % 06/11/2025 44.6  18 - 48 % Final    Mono % 06/11/2025 10.4  4 - 15 % Final    Eos % 06/11/2025 0.8  <=8 % Final    Basophil % 06/11/2025 0.6  <=1.9 % Final    Imm Grans % 06/11/2025 0.2  0.0 - 0.5 % Final    Neut # 06/11/2025 2.25  1.8 - 7.7 K/uL Final    Lymph # 06/11/2025 2.31  1 - 4.8 K/uL Final    Mono # 06/11/2025 0.54  0.3 - 1 K/uL Final    Eos # 06/11/2025 0.04  <=0.5 K/uL Final    Baso # 06/11/2025 0.03  <=0.2 K/uL Final    Imm Grans # 06/11/2025 0.01  0.00 - 0.04 K/uL Final    Mild elevation in immature granulocytes is non specific and can be seen in a variety of conditions including stress response, acute inflammation, trauma and pregnancy. Correlation with other laboratory and clinical findings is essential.    Urine Microalbumin 06/11/2025 121.0    ug/mL Final    Urine Creatinine 06/11/2025 167.0  15.0 - 325.0 mg/dL Final    Microalbumin/Creatinine Ratio Urine 06/11/2025 72.5 (H)  <=30.0 ug/mg Final       ASSESSMENT/PLAN:    1. Encounter for general adult medical examination with abnormal findings  -     Hemoglobin A1C; Future; Expected date: 12/11/2025  -     Comprehensive Metabolic Panel; Future; Expected date: 12/11/2025  -     CBC Auto Differential; Future; Expected date: 12/11/2025    2. Chronic systolic heart failure  Overview:  6/25/24 Echo The left ventricle is mildly to moderately dilated. Mildly to moderately increased wall thickness. Unable to assess wall motion. There is moderately  reduced systolic function with a visually estimated ejection fraction of 30 - 35%. 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  Dispense: 60 tablet; Refill: 0    3. History of congenital cardiac septal defect    4. Type 2 diabetes mellitus with diabetic microalbuminuria, without long-term current use of insulin  Overview:  Lab Results   Component Value Date    HGBA1C 5.2 06/11/2025     - normalized s/p gastric bypass  - off of tirzepatide   -I recommend monitor glucose monthly with fasting glucose goal  mg/dL.  Discuss if starts to increase to notify me so we can restart medication    Orders:  -     Hemoglobin A1C; Future; Expected date: 12/11/2025  -     Comprehensive Metabolic Panel; Future; Expected date: 12/11/2025  -     Microalbumin/Creatinine Ratio, Urine; Future; Expected date: 12/18/2025    5. Hypertension associated with type 2 diabetes mellitus  Overview:  - slightly above goal and Entresto restarted  - continue current management plan   - patient encouraged to notify me with any changes    Orders:  -     sacubitriL-valsartan (ENTRESTO) 24-26 mg per tablet; Take 1 tablet by mouth 2 (two) times daily.  Dispense: 60 tablet; Refill: 0    6. Hyperlipidemia associated with type 2 diabetes mellitus  Overview:  Lab Results   Component Value Date    LDLCALC 108.6 06/11/2025     - LDL goal <100  - on atorvastatin 40 mg and followed by Cardiology    Orders:  -     Lipid Panel; Future; Expected date: 12/18/2025    7. Class 1 obesity due to excess calories with serious comorbidity and body mass index (BMI) of 34.0  to 34.9 in adult  Overview:  - s/p gastric bypass surgery 02/07/2025   - 02/07/2025 weight 212   -current weight     Wt Readings from Last 1 Encounters:   06/18/25 83.3 kg (183 lb 10.3 oz)   - great job!  - discussed recommendation for diet and cardiovascular exercise  - counseling on lifestyle modifications for risk factor reduction  - counseling on management options  - considering weight reduction surgery        8. Vitamin D insufficiency  Overview:  - vitamin-D deficiency and was started on ergocalciferol 08592 IU weekly by bariatric surgery.  She completed that course.  Her last vitamin-D while on the high-dose ergocalciferol was 33   -recommend continue vitamin-D with D3 2000 IU daily    Orders:  -     cholecalciferol, vitamin D3, (VITAMIN D3) 50 mcg (2,000 unit) Cap capsule; Take 1 capsule (2,000 Units total) by mouth once daily.  Dispense: 90 capsule; Refill: 3          ORDERS:   Orders Placed This Encounter    Hemoglobin A1C    Comprehensive Metabolic Panel    CBC Auto Differential    Microalbumin/Creatinine Ratio, Urine    Lipid Panel    sacubitriL-valsartan (ENTRESTO) 24-26 mg per tablet    cholecalciferol, vitamin D3, (VITAMIN D3) 50 mcg (2,000 unit) Cap capsule       Vaccines recommended:  COVID-19    Follow up in about 6 months (around 12/18/2025) for diabetes, Labs. or sooner with any concerns      This encounter was dictated and transcribed using Deep[employer]ribe and [employer], please excuse any typographical or grammatical errors.    Dr. Ina Worthy D.O.   Family Medicine         [1]   Social History  Tobacco Use    Smoking status: Never     Passive exposure: Never    Smokeless tobacco: Never   Substance Use Topics    Alcohol use: Not Currently     Comment: Occasionally    Drug use: No

## 2025-06-18 ENCOUNTER — OFFICE VISIT (OUTPATIENT)
Dept: PRIMARY CARE CLINIC | Facility: CLINIC | Age: 40
End: 2025-06-18
Attending: FAMILY MEDICINE
Payer: COMMERCIAL

## 2025-06-18 VITALS
DIASTOLIC BLOOD PRESSURE: 84 MMHG | BODY MASS INDEX: 34.67 KG/M2 | OXYGEN SATURATION: 100 % | WEIGHT: 183.63 LBS | HEIGHT: 61 IN | RESPIRATION RATE: 18 BRPM | HEART RATE: 75 BPM | SYSTOLIC BLOOD PRESSURE: 134 MMHG

## 2025-06-18 DIAGNOSIS — E11.29 TYPE 2 DIABETES MELLITUS WITH DIABETIC MICROALBUMINURIA, WITHOUT LONG-TERM CURRENT USE OF INSULIN: ICD-10-CM

## 2025-06-18 DIAGNOSIS — Z00.01 ENCOUNTER FOR GENERAL ADULT MEDICAL EXAMINATION WITH ABNORMAL FINDINGS: Primary | ICD-10-CM

## 2025-06-18 DIAGNOSIS — I50.22 CHRONIC SYSTOLIC HEART FAILURE: ICD-10-CM

## 2025-06-18 DIAGNOSIS — E55.9 VITAMIN D INSUFFICIENCY: ICD-10-CM

## 2025-06-18 DIAGNOSIS — E11.69 HYPERLIPIDEMIA ASSOCIATED WITH TYPE 2 DIABETES MELLITUS: ICD-10-CM

## 2025-06-18 DIAGNOSIS — I15.2 HYPERTENSION ASSOCIATED WITH TYPE 2 DIABETES MELLITUS: ICD-10-CM

## 2025-06-18 DIAGNOSIS — E11.59 HYPERTENSION ASSOCIATED WITH TYPE 2 DIABETES MELLITUS: ICD-10-CM

## 2025-06-18 DIAGNOSIS — R80.9 TYPE 2 DIABETES MELLITUS WITH DIABETIC MICROALBUMINURIA, WITHOUT LONG-TERM CURRENT USE OF INSULIN: ICD-10-CM

## 2025-06-18 DIAGNOSIS — E66.811 CLASS 1 OBESITY DUE TO EXCESS CALORIES WITH SERIOUS COMORBIDITY AND BODY MASS INDEX (BMI) OF 34.0 TO 34.9 IN ADULT: ICD-10-CM

## 2025-06-18 DIAGNOSIS — E66.09 CLASS 1 OBESITY DUE TO EXCESS CALORIES WITH SERIOUS COMORBIDITY AND BODY MASS INDEX (BMI) OF 34.0 TO 34.9 IN ADULT: ICD-10-CM

## 2025-06-18 DIAGNOSIS — Z87.74 HISTORY OF CONGENITAL CARDIAC SEPTAL DEFECT: ICD-10-CM

## 2025-06-18 DIAGNOSIS — E78.5 HYPERLIPIDEMIA ASSOCIATED WITH TYPE 2 DIABETES MELLITUS: ICD-10-CM

## 2025-06-18 PROCEDURE — 99999 PR PBB SHADOW E&M-EST. PATIENT-LVL V: CPT | Mod: PBBFAC,,, | Performed by: FAMILY MEDICINE

## 2025-06-18 RX ORDER — SACUBITRIL AND VALSARTAN 24; 26 MG/1; MG/1
1 TABLET, FILM COATED ORAL 2 TIMES DAILY
Qty: 60 TABLET | Refills: 0 | Status: SHIPPED | OUTPATIENT
Start: 2025-06-18 | End: 2025-07-18

## 2025-06-18 RX ORDER — ACETAMINOPHEN 500 MG
2000 TABLET ORAL DAILY
Qty: 90 CAPSULE | Refills: 3 | Status: SHIPPED | OUTPATIENT
Start: 2025-06-18 | End: 2026-06-18

## 2025-06-20 ENCOUNTER — TELEPHONE (OUTPATIENT)
Dept: CARDIOLOGY | Facility: CLINIC | Age: 40
End: 2025-06-20
Payer: COMMERCIAL

## 2025-06-24 ENCOUNTER — OFFICE VISIT (OUTPATIENT)
Dept: CARDIOLOGY | Facility: CLINIC | Age: 40
End: 2025-06-24
Payer: COMMERCIAL

## 2025-06-24 VITALS
SYSTOLIC BLOOD PRESSURE: 131 MMHG | DIASTOLIC BLOOD PRESSURE: 97 MMHG | HEIGHT: 61 IN | WEIGHT: 172.81 LBS | OXYGEN SATURATION: 100 % | HEART RATE: 94 BPM | BODY MASS INDEX: 32.63 KG/M2

## 2025-06-24 DIAGNOSIS — E11.9 TYPE 2 DIABETES MELLITUS WITHOUT COMPLICATION, WITHOUT LONG-TERM CURRENT USE OF INSULIN: ICD-10-CM

## 2025-06-24 DIAGNOSIS — Z87.74 HISTORY OF CONGENITAL CARDIAC SEPTAL DEFECT: ICD-10-CM

## 2025-06-24 DIAGNOSIS — I50.22 CHRONIC SYSTOLIC HEART FAILURE: Primary | ICD-10-CM

## 2025-06-24 DIAGNOSIS — E11.69 HYPERLIPIDEMIA ASSOCIATED WITH TYPE 2 DIABETES MELLITUS: ICD-10-CM

## 2025-06-24 DIAGNOSIS — I15.2 HYPERTENSION ASSOCIATED WITH TYPE 2 DIABETES MELLITUS: ICD-10-CM

## 2025-06-24 DIAGNOSIS — G47.33 OSA ON CPAP: ICD-10-CM

## 2025-06-24 DIAGNOSIS — E78.5 HYPERLIPIDEMIA ASSOCIATED WITH TYPE 2 DIABETES MELLITUS: ICD-10-CM

## 2025-06-24 DIAGNOSIS — E11.59 HYPERTENSION ASSOCIATED WITH TYPE 2 DIABETES MELLITUS: ICD-10-CM

## 2025-06-24 PROCEDURE — 3066F NEPHROPATHY DOC TX: CPT | Mod: CPTII,S$GLB,, | Performed by: PHYSICIAN ASSISTANT

## 2025-06-24 PROCEDURE — 3075F SYST BP GE 130 - 139MM HG: CPT | Mod: CPTII,S$GLB,, | Performed by: PHYSICIAN ASSISTANT

## 2025-06-24 PROCEDURE — 3060F POS MICROALBUMINURIA REV: CPT | Mod: CPTII,S$GLB,, | Performed by: PHYSICIAN ASSISTANT

## 2025-06-24 PROCEDURE — 3008F BODY MASS INDEX DOCD: CPT | Mod: CPTII,S$GLB,, | Performed by: PHYSICIAN ASSISTANT

## 2025-06-24 PROCEDURE — 99214 OFFICE O/P EST MOD 30 MIN: CPT | Mod: S$GLB,,, | Performed by: PHYSICIAN ASSISTANT

## 2025-06-24 PROCEDURE — 3080F DIAST BP >= 90 MM HG: CPT | Mod: CPTII,S$GLB,, | Performed by: PHYSICIAN ASSISTANT

## 2025-06-24 PROCEDURE — 1160F RVW MEDS BY RX/DR IN RCRD: CPT | Mod: CPTII,S$GLB,, | Performed by: PHYSICIAN ASSISTANT

## 2025-06-24 PROCEDURE — 3044F HG A1C LEVEL LT 7.0%: CPT | Mod: CPTII,S$GLB,, | Performed by: PHYSICIAN ASSISTANT

## 2025-06-24 PROCEDURE — 1159F MED LIST DOCD IN RCRD: CPT | Mod: CPTII,S$GLB,, | Performed by: PHYSICIAN ASSISTANT

## 2025-06-24 PROCEDURE — 99999 PR PBB SHADOW E&M-EST. PATIENT-LVL V: CPT | Mod: PBBFAC,,, | Performed by: PHYSICIAN ASSISTANT

## 2025-06-24 PROCEDURE — 4010F ACE/ARB THERAPY RXD/TAKEN: CPT | Mod: CPTII,S$GLB,, | Performed by: PHYSICIAN ASSISTANT

## 2025-06-24 RX ORDER — SACUBITRIL AND VALSARTAN 24; 26 MG/1; MG/1
1 TABLET, FILM COATED ORAL 2 TIMES DAILY
Qty: 60 TABLET | Refills: 11 | Status: SHIPPED | OUTPATIENT
Start: 2025-06-24 | End: 2026-06-19

## 2025-06-24 RX ORDER — CARVEDILOL 6.25 MG/1
6.25 TABLET ORAL 2 TIMES DAILY WITH MEALS
Qty: 60 TABLET | Refills: 11 | Status: SHIPPED | OUTPATIENT
Start: 2025-06-24 | End: 2026-06-19

## 2025-06-24 NOTE — PROGRESS NOTES
General Cardiology Clinic Note  Reason for Visit: Follow up   Last Clinic Visit: 12/18/2024  General Cardiologist: Dr. Lozada     HPI:   Melissa Carvalho is a 39 y.o. female who presents for follow up.     Problems:  HFrEF   Hypertension  DM  Congenital heart defect (repaired at 8 y/o)  Obesity s/p gastric sleeve  Strong family history of CHF    Interval HPI  Patient presents for medication management. She underwent bariatric surgery in February and has lost a significant amount of weight as a result. She is no longer able to tolerate her BP meds without having symptomatic hypotension. She has been off Entresto since the surgery. She is still taking Chlorthalidone and Coreg but states it make her feel poorly. No longer on diabetes meds or statin either. Doing well otherwise. She stays active. No cardiac symptoms.    12/18/2024 (Dr. Lozada)   Very pleasant young woman with chronic HFrEF - EF last known to be 30-35% (July 2024) - and a history of a congenital heart defect surgically repaired at age 7 presenting to [facility] care.     She feels well with no new symptoms or cardiovascular complaints and no change in exercise capacity.  She denies chest discomfort, MENDIETA, palpitations, PND/orthopnea, lightheadedness and syncope.  She works security at [employer] and climbs stairs without difficulty.     She was taking semaglutide until recently when that was switched to tirzepatide.  She does not take an SGLT2 inhibitor or spironolactone.     She's planning bariatric surgery in February.  She's chosen the gastric sleeve procedure.    ROS:      Review of Systems   Constitutional: Positive for weight loss. Negative for diaphoresis, malaise/fatigue and weight gain.   HENT:  Negative for nosebleeds.    Eyes:  Negative for vision loss in left eye, vision loss in right eye and visual disturbance.   Cardiovascular:  Negative for chest pain, claudication, dyspnea on exertion, irregular heartbeat, leg swelling, near-syncope,  orthopnea, palpitations, paroxysmal nocturnal dyspnea and syncope.   Respiratory:  Negative for cough, shortness of breath, sleep disturbances due to breathing, snoring and wheezing.    Hematologic/Lymphatic: Negative for bleeding problem. Does not bruise/bleed easily.   Skin:  Negative for poor wound healing and rash.   Musculoskeletal:  Negative for muscle cramps and myalgias.   Gastrointestinal:  Negative for bloating, abdominal pain, diarrhea, heartburn, melena, nausea and vomiting.   Genitourinary:  Negative for hematuria and nocturia.   Neurological:  Positive for light-headedness. Negative for brief paralysis, dizziness, headaches, numbness and weakness.   Psychiatric/Behavioral:  Negative for depression.    Allergic/Immunologic: Negative for hives.       PMH:     Past Medical History:   Diagnosis Date    Diabetes mellitus     Heart murmur     Hypertension     Miscarriage     x 2 at 4 weeks GA     Past Surgical History:   Procedure Laterality Date    CARDIAC SURGERY      at age 7    INJECTION OF ANESTHETIC AGENT INTO TISSUE PLANE DEFINED BY TRANSVERSUS ABDOMINIS MUSCLE  2/7/2025    Procedure: BLOCK, TRANSVERSUS ABDOMINIS PLANE;  Surgeon: Laurie Hector MD;  Location: [facility] OR [address] [place] [address];  Service: General;;    [place]    [facility] [place]OBOT-ASSISTED REPAIR OF HIATAL HERNIA USING DA JAIME XI  2/7/2025    Procedure: XI ROBOTIC REPAIR, HERNIA, HIATAL;  Surgeon: Laurie Hector MD;  Location: [facility] OR [address] [place] [address];  Service: General;;    XI ROBOTIC GASTROENTEROSTOMY, MADI-EN-Y N/A 2/7/2025    Procedure: XI ROBOTIC GASTROENTEROSTOMY, MADI-EN-Y with intra op EGD;  Surgeon: Laurie Hector MD;  Location: [address];  Service: General;  Laterality: N/A;  Surgeon to perform tap block intraoperatively     Allergies:   Review of patient's allergies indicates:  No Known Allergies  Medications:   Medications Ordered Prior to Encounter[1]  Social History:     Social History     Tobacco Use    Smoking status:  "Never     Passive exposure: Never    Smokeless tobacco: Never   Substance Use Topics    Alcohol use: Not Currently     Comment: Occasionally     Family History:     Family History   Problem Relation Name Age of Onset    Diabetes Mother      Hypertension Mother      Cancer Father      Drug abuse Brother      Breast cancer Neg Hx      Colon cancer Neg Hx      Ovarian cancer Neg Hx       Physical Exam:   BP (!) 131/97   Pulse 94   Ht 5' 1" (1.549 m)   Wt 78.4 kg (172 lb 13.5 oz)   SpO2 100%   BMI 32.66 kg/m²        Physical Exam  Vitals and nursing note reviewed.   Constitutional:       General: She is not in acute distress.     Appearance: Normal appearance. She is not ill-appearing.   HENT:      Head: Normocephalic and atraumatic.   Eyes:      General: No scleral icterus.     Conjunctiva/sclera: Conjunctivae normal.   Neck:      Thyroid: No thyromegaly.      Vascular: No carotid bruit or JVD.   Cardiovascular:      Rate and Rhythm: Normal rate and regular rhythm.      Pulses: Normal pulses.      Heart sounds: Normal heart sounds. No murmur heard.     No friction rub. No gallop.   Pulmonary:      Effort: Pulmonary effort is normal.      Breath sounds: Normal breath sounds. No wheezing, rhonchi or rales.   Chest:      Chest wall: No tenderness.   Abdominal:      General: Bowel sounds are normal. There is no distension.      Palpations: Abdomen is soft.      Tenderness: There is no abdominal tenderness.   Musculoskeletal:         General: No swelling.      Cervical back: Neck supple.      Right lower leg: No edema.      Left lower leg: No edema.   Skin:     General: Skin is warm and dry.      Coloration: Skin is not pale.      Findings: No erythema or rash.      Nails: There is no clubbing.   Neurological:      Mental Status: She is alert and oriented to person, place, and time. Mental status is at baseline.   Psychiatric:         Mood and Affect: Mood and affect normal.         Behavior: Behavior normal.      "     Labs:     Lab Results   Component Value Date     06/11/2025     02/21/2025    K 4.6 06/11/2025    K 3.5 02/21/2025     06/11/2025     02/21/2025    CO2 23 06/11/2025    CO2 24 02/21/2025    BUN 8 06/11/2025    CREATININE 0.9 06/11/2025    GLUCOSE 92 06/05/2024    ANIONGAP 11 06/11/2025     Lab Results   Component Value Date    HGBA1C 5.2 06/11/2025    HGBA1C 5.8 (H) 12/17/2024     Lab Results   Component Value Date    BNP 13 05/13/2023    Lab Results   Component Value Date    WBC 5.18 06/11/2025    HGB 11.7 (L) 06/11/2025    HGB 10.2 (L) 02/21/2025    HCT 37.6 06/11/2025    HCT 32.4 (L) 02/21/2025     06/11/2025     02/21/2025    GRAN 2.7 02/21/2025    GRAN 55.9 02/21/2025     Lab Results   Component Value Date    CHOL 164 06/11/2025    CHOL 194 08/29/2024    HDL 35 (L) 06/11/2025    LDLCALC 108.6 06/11/2025    TRIG 102 06/11/2025    TRIG 162 (H) 08/29/2024          Imaging:   Echocardiograms:   TTE 7/2/2024    Left Ventricle: The left ventricle is mildly to moderately dilated. Mildly to moderately increased wall thickness. Unable to assess wall motion. There is moderately reduced systolic function with a visually estimated ejection fraction of 30 - 35%.  Technically difficult estimation of LVEF.  Echocontrast could help for more definitive evaluation.  There is indeterminate diastolic function.    Mitral Valve: There is trace to mild regurgitation.    Right Ventricle: Normal right ventricular cavity size. Systolic function is normal.    Pericardium: There is a trivial effusion.    No evidence of intracardiac shunt on bubble study.    TTE 6/11/2024    Left Ventricle: The left ventricle is moderately dilated. Moderately increased wall thickness; asymmetric; posterior wall greater than septal wall.  Prominent trabeculation. Global hypokinesis present. There is moderately reduced systolic function with a visually estimated ejection fraction of 30 - 35%.  Technically difficult  estimation of LVEF based on limited images with poor endocardial border visualization.  There is indeterminate diastolic function.    Mitral Valve: There is mild regurgitation.    Right Ventricle: Normal right ventricular cavity size. Systolic function is normal.    IVC/SVC:   Central venous pressure estimated at 3-8 mmHg.    Pericardium: There is a trivial effusion.    Stress Tests:   None    Caths:   None    Other:  None      EKG 5/13/2023: NSR, LVH, nonspecific T wave changes    Assessment:     1. Chronic systolic heart failure    2. History of congenital cardiac septal defect    3. Hypertension associated with type 2 diabetes mellitus    4. Hyperlipidemia associated with type 2 diabetes mellitus    5. Type 2 diabetes mellitus without complication, without long-term current use of insulin    6. CIPRIANO on CPAP      Plan:     Chronic systolic CHF  No longer able to tolerate current doses of GDMT since losing weight.   NYHA Class 1.   Decrease Entresto to 24-26 mg bid  Decrease Coreg to 6.25 mg bid    History of congenital heart disease  No residual shunts on TTE    Hypertension  She is having symptomatic hypotension on current doses of GDMT  Decrease doses of Entresto and Coreg, as above  D/c Chlorthalidone    Hyperlipidemia  Improved with weight loss. No longer needs statin.     Type 2 DM   Now diet controlled since weight loss. A1c 5.2%          Follow up in one year. Will send her a patient portal message in 2 weeks asking about her blood pressure.     Signed:  Myah Mcmanus PA-C  Cardiology   [phone] - General         [1]   Current Outpatient Medications on File Prior to Visit   Medication Sig Dispense Refill    ammonium lactate 12 % Crea Apply 1 gramas directed topically Daily. 140 g 1    atorvastatin (LIPITOR) 40 MG tablet Take 1 tablet (40 mg total) by mouth once daily. 90 tablet 3    azelastine (ASTELIN) 137 mcg (0.1 %) nasal spray 1 spray 2 (two) times daily.      blood sugar diagnostic (TRUE METRIX  GLUCOSE TEST STRIP) Strp Use strip to test blood sugar once daily 100 each 5    blood sugar diagnostic Strp Check blood sugar every day 100 each 5    blood sugar diagnostic Strp Use to check sugar once per day.      blood-glucose meter Misc Use to check sugar.      carvediloL (COREG) 25 MG tablet Take 1 tablet (25 mg total) by mouth 2 (two) times daily with meals. 180 tablet 3    cetirizine (ZYRTEC) 10 MG tablet Take 1 tablet (10 mg total) by mouth once daily. 90 tablet 3    cetirizine (ZYRTEC) 10 MG tablet Oral for 30 Days      chlorthalidone (HYGROTEN) 25 MG Tab Take 1 tablet (25 mg total) by mouth once daily. 90 tablet 3    cholecalciferol, vitamin D3, (VITAMIN D3) 50 mcg (2,000 unit) Cap capsule Take 1 capsule (2,000 Units total) by mouth once daily. 90 capsule 3    ciclopirox (PENLAC) 8 % Soln       fluticasone propionate (FLONASE) 50 mcg/actuation nasal spray       gabapentin (NEURONTIN) 300 MG capsule OPEN capsule into a tablespoon and consume with sip of liquid. Take once the MORNING OF SURGERY. After surgery: take one capsule TWICE DAILY for at least 3 days and up to 5 days as needed for pain. 11 capsule 0    lancets Misc Use to check sugar once per day.      multivitamin (THERAGRAN) per tablet Take 1 tablet by mouth once daily.      norethindrone (MICRONOR) 0.35 mg tablet Take 1 tablet (0.35 mg total) by mouth once daily. (Patient not taking: Reported on 6/18/2025) 28 tablet 12    omeprazole (PRILOSEC) 40 MG capsule Take 1 capsule (40 mg total) by mouth every morning. Open capsule and take with apple sauce 30 capsule 2    sacubitriL-valsartan (ENTRESTO) 24-26 mg per tablet Take 1 tablet by mouth 2 (two) times daily. 60 tablet 0    ursodioL (TODD FORTE) 500 MG tablet Take 1 tablet (500 mg total) by mouth once daily. For gallstone prevention. 180 tablet 0     No current facility-administered medications on file prior to visit.

## 2025-07-07 ENCOUNTER — PATIENT MESSAGE (OUTPATIENT)
Dept: BARIATRICS | Facility: CLINIC | Age: 40
End: 2025-07-07
Payer: COMMERCIAL

## 2025-07-08 ENCOUNTER — PATIENT MESSAGE (OUTPATIENT)
Dept: CARDIOLOGY | Facility: CLINIC | Age: 40
End: 2025-07-08
Payer: COMMERCIAL

## 2025-07-08 ENCOUNTER — PATIENT MESSAGE (OUTPATIENT)
Dept: BARIATRICS | Facility: CLINIC | Age: 40
End: 2025-07-08
Payer: COMMERCIAL

## 2025-08-06 ENCOUNTER — OFFICE VISIT (OUTPATIENT)
Dept: BARIATRICS | Facility: CLINIC | Age: 40
End: 2025-08-06
Payer: COMMERCIAL

## 2025-08-06 VITALS — WEIGHT: 167 LBS | BODY MASS INDEX: 31.55 KG/M2

## 2025-08-06 DIAGNOSIS — Z98.84 S/P BARIATRIC SURGERY: Primary | ICD-10-CM

## 2025-08-06 PROCEDURE — 98004 SYNCH AUDIO-VIDEO EST SF 10: CPT | Mod: 95,,, | Performed by: NURSE PRACTITIONER

## 2025-08-06 PROCEDURE — 3066F NEPHROPATHY DOC TX: CPT | Mod: CPTII,95,, | Performed by: NURSE PRACTITIONER

## 2025-08-06 PROCEDURE — 1159F MED LIST DOCD IN RCRD: CPT | Mod: CPTII,95,, | Performed by: NURSE PRACTITIONER

## 2025-08-06 PROCEDURE — 4010F ACE/ARB THERAPY RXD/TAKEN: CPT | Mod: CPTII,95,, | Performed by: NURSE PRACTITIONER

## 2025-08-06 PROCEDURE — 3060F POS MICROALBUMINURIA REV: CPT | Mod: CPTII,95,, | Performed by: NURSE PRACTITIONER

## 2025-08-06 PROCEDURE — 1160F RVW MEDS BY RX/DR IN RCRD: CPT | Mod: CPTII,95,, | Performed by: NURSE PRACTITIONER

## 2025-08-06 PROCEDURE — 3044F HG A1C LEVEL LT 7.0%: CPT | Mod: CPTII,95,, | Performed by: NURSE PRACTITIONER

## 2025-08-06 NOTE — PROGRESS NOTES
The patient location is: [place]  The chief complaint leading to consultation is: 6 months post op    Visit type: audiovisual    Face to Face time with patient: 10  15 minutes of total time spent on the encounter, which includes face to face time and non-face to face time preparing to see the patient (eg, review of tests), Obtaining and/or reviewing separately obtained history, Documenting clinical information in the electronic or other health record, Independently interpreting results (not separately reported) and communicating results to the patient/family/caregiver, or Care coordination (not separately reported).         Each patient to whom he or she provides medical services by telemedicine is:  (1) informed of the relationship between the physician and patient and the respective role of any other health care provider with respect to management of the patient; and (2) notified that he or she may decline to receive medical services by telemedicine and may withdraw from such care at any time.    Notes:           BARIATRIC POST-OPERATIVE VISIT:    HPI:  Melissa Carvalho is a 39 y.o. year old female presents for 6 months post op visit following LRNY.  she is doing well and tolerating the diet without difficulty.  she has no complaints.    Denies: nausea, vomiting, abdominal pain, changes in bowel movement pattern, fever, chills, dysphagia, chest pain, and shortness of breath.    Decrease in HTN meds     Review of Systems   Constitutional:  Negative for activity change and fatigue.   Respiratory:  Negative for cough and shortness of breath.    Cardiovascular:  Negative for chest pain, palpitations and leg swelling.   Gastrointestinal:  Negative for abdominal pain, nausea and vomiting.   Endocrine: Negative for polydipsia, polyphagia and polyuria.   Genitourinary:  Negative for dysuria.   Musculoskeletal:  Negative for gait problem.   Skin:  Negative for rash.   Allergic/Immunologic: Negative for immunocompromised  state.   Neurological:  Negative for dizziness, syncope and weakness.   Hematological:  Does not bruise/bleed easily.   Psychiatric/Behavioral:  Negative for behavioral problems.        EXERCISE & VITAMINS:  See Bariatric Assessment    MEDICATIONS/ALLERGIES:  Have been reviewed.    DIET: Regular Bariatric Diet.  ~80 grams protein.  64+ fl oz SF clear beverage.        Physical Exam  Vitals and nursing note reviewed.   Constitutional:       Appearance: Normal appearance. She is obese.   Neurological:      General: No focal deficit present.      Mental Status: She is alert.   Psychiatric:         Mood and Affect: Mood normal.         Behavior: Behavior normal.       ASSESSMENT:  - Morbid obesity s/p laparoscopic Rios-en-Y on 2/7/25.  - Co-morbidities: diabetes mellitus, dyslipidemia, hypertension, and obstructive sleep apnea  -  Weight loss,55#'s and 59% EWL  -  Exercise routine walking   - Good Diet  - Good Vitamin regimen     PLAN:  - Ursodiol d/c  - Miralax daily for constipation prn   - Emphasized the importance of regular exercise and adherence to bariatric diet to achieve maximum weight loss.  - Encouraged patient to start regular exercise.  - Follow-up with dietician to advance diet.  - Continue daily vitamins and medications.  - RTC in 6 months or sooner if needed.  - Call the office for any issues.  - Check labs today.

## 2025-08-06 NOTE — PATIENT INSTRUCTIONS
Meal Ideas for Regular Bariatric Diet  *Recipes and products available at [email or web address]      Breakfast: (15-20g protein)    - Egg white omelet: 2 egg whites or ½ cup Egg Beaters. (Optional proteins: cheese, shrimp, black beans, chicken, sliced turkey) (Optional veggies: tomatoes, salsa, spinach, mushrooms, onions, green peppers, or small slice avocado)     - Egg and sausage: 1 egg or ¼ cup Egg Beaters (any variety), with 1 brian or 2 links of Turkey sausage or Veggie breakfast sausage (Clustrix or Resoomay)    - Crust-less breakfast quiche: To make a glass pie dish, mix 4oz part skim Ricotta, 1 cup skim milk, and 2 eggs as your base. Add protein: shredded cheese, sliced lean ham or turkey, turkey machado/sausage. Add veggies: tomato, onion, green onion, mushroom, green pepper, spinach, etc.    - Yogurt parfait: Mix 1 - 6oz container Dannon Light N Fit vanilla yogurt, with ¼ cup crushed unsalted nuts    - Cottage cheese and fruit: ½ cup part-skim cottage cheese or ricotta cheese topped with fresh fruit or sugar free preserves     - Myesha Ko's Vanilla Egg custard* (add 2 Tbsp instant coffee granules to make Cappuccino Custard*)    - Hi-Protein café latte (skim milk, decaf coffee, 1 scoop protein powder). Optional to add Sugar free syrup or extract flavoring.    - Breakfast Lox: spread fat free cream cheese on slices of smoked salmon. Serve over scrambled or egg over easy (sauteed with nonstick cookspray) OR on a cucumber slice    - Eggwhich: Scramble or cook 1 large egg over easy using nonstick cookspray. Place between 2 slices of [place] of [place] machado and low fat cheese.     Lunch: (20-30g protein)    - ½ cup Black bean soup (Homemade or Progresso), with ¼ cup shredded low-fat cheese. Top with chopped tomato or fresh salsa.     - Lean deli turkey breast and low-fat sliced cheese, mustard or light melendez to moisten, rolled up together, or wrapped in a Ramon lettuce leaf    - Chicken salad made from dinner  leftovers, moisten with low-fat salad dressing or light melendez. Also try leftover salmon, shrimp, tuna or boiled eggs. Serve ½ cup over dark green salad    - Fat-free canned refried beans, topped with ¼ cup shredded low-fat cheese. Top with chopped tomato or fresh salsa.     - Greek salad: Top mixed greens with 1-2oz grilled chicken, tomatoes, red onions, 2-3 kalamata olives, and sprinkle lightly with feta cheese. Spritz with Balsamic vinegar to taste.     - Crust-less lunch quiche: To make a glass pie dish, mix 4oz part skim Ricotta, 1 cup skim milk, and 2 eggs as your base. Add protein: shredded cheese, sliced lean ham or turkey, shrimp, chicken. Add veggies: tomato, onion, green onion, mushroom, green pepper, spinach, artichoke, broccoli, etc.    - Pizza bake: spread a  tamie martha mushroom with tomato sauce, low-fat shredded mozzarella and turkey pepperoni or [place] machado. Add any veggies. Roast for 10-15 minutes, until cheese melted.     - Cucumber crab bites: Spread ¼ cup crab dip (lump crabmeat + light cream cheese and green onions) over sliced cucumber.     - Chicken with light spinach and artichoke dip*: Puree in : 6oz cooked and drained spinach, 2 cloves garlic, 1 can cannelloni beans, ½ cup chopped green onions, 1 can drained artichoke hearts (not marinated in oil), lemon juice and basil. Mix in 2oz chopped up chicken.    Supper: (20-30g protein)    - Serve grilled fish over dark green salad tossed with low-fat dressing, served with grilled asparagus mendes     - Rotisserie chicken salad: served with sliced strawberries, walnuts, fat-free feta cheese crumbles and 1 tbsp Daviss Own Light Raspberry McDonald Vinaigrette    - Shrimp cocktail: Dip cold boiled shrimp in homemade low-sugar cocktail sauce (1/2 cup Monty One Carb ketchup, 2 tbsp horseradish, 1/4 tsp hot sauce, 1 tsp Worcestershire sauce, 1 tbsp freshly-squeezed lemon juice). Serve with dark green salad, walnuts, and crumbled blue  cheese drizzled with olive oil and Balsamic vinegar    - Tuna Melt: Spread tuna salad onto 2 thick slices of tomato. Top with low-fat cheese and broil until cheese is melted. May also be made with chicken salad of shrimp salad. [place] with different types of cheeses.    - Chicken or beef fajitas (no tortilla, rice, beans, chips). Top meat and veggies w/ fresh salsa, fat free sour cream.     - Homemade low-fat Chili using extra lean ground beef or ground turkey. Top with shredded cheese and salsa as desired. May add dollop fat-free sour cream if desired    - Chicken parmesan: Top chicken breast w/ low sugar marinara sauce, mozzarella cheese and bake until chicken reaches 165*.  Serve w/ spaghetti SQUASH or Portuguese cut green beans    - Dinner Omelet with shrimp or chicken and onion, green peppers and chives.    - No noodle lasagna: Use sliced zucchini or eggplant in place of noodles.  Layer with part skim ricotta cheese and low sugar meat sauce (use very lean ground beef or ground turkey).    - Mexican chicken bake: Bake chunks of chicken breast or thigh with taco seasoning, Pace brand enchilada sauce, green onions and low-fat cheese. Serve with ¼ cup black beans or fat free refried beans topped with chopped tomatoes or salsa.    - Farooq frozen meatballs, simmered in Classico Marinara sauce. Different flavors of salsa or spaghetti sauce create different dishes! Sprinkle with parmesan cheese. Serve with grilled or steamed veggies, or a dark green salad.    - Simmer boneless skinless chicken thigh chunks in Classico Marinara sauce or roasted salsa until tender with chopped onion, bell pepper, garlic, mushrooms, spinach, etc.     - Hamburger or veggie burger, without the bun, dressed the way you like. Served with grilled or steamed veggies.    - Eggplant parmesan: Bake slices of eggplant at 350 degrees for 15 minutes. Layer tomato sauce, sliced eggplant and low-fat mozzarella cheese in a baking dish and cover with  foil. Bake 30-40 more minutes or until bubbly. Uncover and bake at 400 degrees for about 15 more minutes, or until top is slightly crisp.    - Fish tacos: grilled/baked white fish, wrapped in Ram[place] lettuce leaf, topped with salsa, shredded low-fat cheese, and light coleslaw.    - Chicken slime: Sprinkle chicken w/ 1 tsp of hidden [place] ranch dip mix. Then grill chicken and top with black beans, salsa and 1 tsp fat free sour cream.     - Cauliflower pizza crust: Use cauliflower as crust (see recipe on pinterest, no flour!). Top w/ low fat cheese, turkey pepperoni and veggies and bake again    - chicken or turkey crust pizza: use ground chicken or turkey instead of cauliflower, spread in [place] and bake at 350 for about 20-30 minutes(may want to add garlic, black pepper, oregano and other herbs to ground meat mixture).  Remove and top w/ low fat cheese, turkey pepperoni and veggies and bake again for another 10 minutes or until cheese is browned.     Snacks: (100-200 calories; >5g protein)    - 1 low-fat cheese stick with 8 cherry tomatoes or 1 serving fresh fruit  - 4 thin slices fat-free turkey breast and 1 slice low-fat cheese  - 4 thin slices fat-free honey ham with wedge of melon  - 6-8 edamame pods (equivalent to about 1/4 cup edamame without pods).   - 1/4 cup unsalted nuts with ½ cup fruit  - 6-oz container Dannon Light n Fit vanilla yogurt, topped with 1oz unsalted nuts         - apple, celery or baby carrots spread with 2 Tbsp PB2  - apple slices with 1 oz slice low-fat cheese  - Apple slices dipped in 2 Tbsp of PB2  - celery, cucumber, bell pepper or baby carrots dipped in ¼ cup hummus bean spread or light spinach and artichoke dip (*recipe in lunch section)  - celery, cucumber, baby carrots dipped in high protein greek yogurt (Mix 16 oz plain greek yogurt + 1 packet of hidden [place] ranch dip mix)  - Edward Links Beef Steak - 14g protein! (similar to beef jerky)  - 2 wedges Laughing Cow - Light Herb  & Garlic Cheese with sliced cucumber or green bell pepper  - 1/2 cup low-fat cottage cheese with ¼ cup fruit or ¼ cup salsa  - RTD Protein drinks: Atkins, Low Carb Slim Fast, EAS light, Muscle Milk Light, etc.  - Homemade Protein drinks: [employer]NC Soy95, Isopure, Nectar, UNJURY, Whey Gourmet, etc. Mix 1 scoop powder with 8oz skim/1% milk or light soymilk.  - Protein bars: Atkins, EAS, Pure Protein, Think Thin, Detour, etc. Must have 0-4 grams sugar - Read the label.    Takeout Options: No more than twice/week  Deli - Salads (no pasta or rice), meats, cheeses. Roasted chicken. Lox (salmon)    Mexican - Platters which don't include tortillas, chips, or rice. Go easy on the beans. Example: Fajitas without the tortillas. Ask the  not to bring chips to the table if they are too tempting.    Greek - Meat or fish and vegetable, but no bread or rice. Including hummus, baba ganoush, etc, is OK. Most sit-down Greek restaurants can provide you with cucumber slices for dipping instead of vipin bread.    Fast Food (Avoid as much as possible) - Salads (no croutons and limit salad dressing to 2 tbsp), grilled chicken sandwich without the bun and ask for no melendez. Naomis low fat chili or Taco Bell pintos and cheese.    BBQ - The meats are fine if you ask for sauces on the side, but most of the traditional side dishes are loaded with carbs. Erick slaw, baked beans and BBQ sauce are typically made with sugar.    Chinese - Nothing deep-fried, no rice or noodles. Many Chinese sauces have starch and sugar in them, so you'll have to use your judgement. If you find that these sauces trigger cravings, or cause Dumping, you can ask for the sauce to be made without sugar or just use soy sauce.

## 2025-08-12 ENCOUNTER — PATIENT MESSAGE (OUTPATIENT)
Dept: BARIATRICS | Facility: CLINIC | Age: 40
End: 2025-08-12
Payer: COMMERCIAL

## (undated) DEVICE — SUT VLOC 90 3-0 V-20 NDL 6

## (undated) DEVICE — SUT VLOC 180 2-0 GS-22 NDL

## (undated) DEVICE — RELOAD SUREFORM 60 2.5 WHT 6R

## (undated) DEVICE — ELECTRODE MEGADYNE RETURN DUAL

## (undated) DEVICE — COVER TIP CURVED SCISSORS XI

## (undated) DEVICE — ADHESIVE DERMABOND ADVANCED

## (undated) DEVICE — SEAL CANN UNIVERSAL 5-12MM

## (undated) DEVICE — NDL INSUF ULTRA VERESS 120MM

## (undated) DEVICE — SUT VICRYL 3-0 27 SH

## (undated) DEVICE — RELOAD SUREFORM 60 3.5 BLU 6R

## (undated) DEVICE — SUT VICRYL+ 27 UR-6 VIOL

## (undated) DEVICE — STAPLER SUREFORM 60 SPU

## (undated) DEVICE — TRAY MINOR GEN SURG OMC

## (undated) DEVICE — OBTURATOR BLADELESS 8MM XI CLR

## (undated) DEVICE — SUT MONOCRYL 4-0 PS-2

## (undated) DEVICE — SUT VLOC 90 3-0 V-20 NDL 9

## (undated) DEVICE — SEALER VESSEL EXTEND

## (undated) DEVICE — DRAPE COLUMN DAVINCI XI

## (undated) DEVICE — DRAPE ARM DAVINCI XI

## (undated) DEVICE — SUT ETHIBOND EXCEL 2-0 SH

## (undated) DEVICE — GOWN POLY REINF BRTH SLV XL

## (undated) DEVICE — DRAPE STERI INSTRUMENT 1018

## (undated) DEVICE — KIT PROCEDURE STER INLET CLOSU

## (undated) DEVICE — DRAPE SCOPE PILLOW WARMER

## (undated) DEVICE — BLADE SURG CARBON STEEL SZ11

## (undated) DEVICE — NDL HYPO REG 25G X 1 1/2

## (undated) DEVICE — Device

## (undated) DEVICE — SUT 2/0 30IN SILK BLK BRAI